# Patient Record
Sex: MALE | Race: WHITE | HISPANIC OR LATINO | Employment: OTHER | URBAN - METROPOLITAN AREA
[De-identification: names, ages, dates, MRNs, and addresses within clinical notes are randomized per-mention and may not be internally consistent; named-entity substitution may affect disease eponyms.]

---

## 2017-01-03 ENCOUNTER — TELEPHONE (OUTPATIENT)
Dept: PULMONOLOGY | Facility: HOSPICE | Age: 62
End: 2017-01-03

## 2017-01-03 DIAGNOSIS — J45.909 UNCOMPLICATED ASTHMA, UNSPECIFIED ASTHMA SEVERITY: ICD-10-CM

## 2017-01-23 ENCOUNTER — OFFICE VISIT (OUTPATIENT)
Dept: PULMONOLOGY | Facility: HOSPICE | Age: 62
End: 2017-01-23
Payer: COMMERCIAL

## 2017-01-23 VITALS
DIASTOLIC BLOOD PRESSURE: 74 MMHG | RESPIRATION RATE: 16 BRPM | HEART RATE: 59 BPM | OXYGEN SATURATION: 92 % | TEMPERATURE: 98.7 F | HEIGHT: 64 IN | SYSTOLIC BLOOD PRESSURE: 126 MMHG | BODY MASS INDEX: 36.7 KG/M2 | WEIGHT: 215 LBS

## 2017-01-23 VITALS — WEIGHT: 215 LBS | BODY MASS INDEX: 39.31 KG/M2

## 2017-01-23 DIAGNOSIS — J30.9 ALLERGIC RHINITIS, UNSPECIFIED ALLERGIC RHINITIS TRIGGER, UNSPECIFIED RHINITIS SEASONALITY: ICD-10-CM

## 2017-01-23 DIAGNOSIS — J45.909 UNCOMPLICATED ASTHMA, UNSPECIFIED ASTHMA SEVERITY: ICD-10-CM

## 2017-01-23 DIAGNOSIS — G47.33 OSA (OBSTRUCTIVE SLEEP APNEA): ICD-10-CM

## 2017-01-23 DIAGNOSIS — I48.0 PAF (PAROXYSMAL ATRIAL FIBRILLATION) (HCC): ICD-10-CM

## 2017-01-23 DIAGNOSIS — I10 ESSENTIAL HYPERTENSION: ICD-10-CM

## 2017-01-23 DIAGNOSIS — J45.20 MILD INTERMITTENT ASTHMA WITHOUT COMPLICATION: ICD-10-CM

## 2017-01-23 PROCEDURE — G8482 FLU IMMUNIZE ORDER/ADMIN: HCPCS | Performed by: NURSE PRACTITIONER

## 2017-01-23 PROCEDURE — 3017F COLORECTAL CA SCREEN DOC REV: CPT | Mod: 8P | Performed by: NURSE PRACTITIONER

## 2017-01-23 PROCEDURE — G8419 CALC BMI OUT NRM PARAM NOF/U: HCPCS | Performed by: NURSE PRACTITIONER

## 2017-01-23 PROCEDURE — 99214 OFFICE O/P EST MOD 30 MIN: CPT | Performed by: NURSE PRACTITIONER

## 2017-01-23 PROCEDURE — G8432 DEP SCR NOT DOC, RNG: HCPCS | Performed by: NURSE PRACTITIONER

## 2017-01-23 PROCEDURE — 1036F TOBACCO NON-USER: CPT | Performed by: NURSE PRACTITIONER

## 2017-01-23 RX ORDER — METOPROLOL SUCCINATE 25 MG/1
25 TABLET, EXTENDED RELEASE ORAL
Refills: 5 | Status: ON HOLD | COMMUNITY
Start: 2017-01-10 | End: 2018-01-04

## 2017-01-23 RX ORDER — INFLUENZA A VIRUS A/IDAHO/07/2018 (H1N1) ANTIGEN (MDCK CELL DERIVED, PROPIOLACTONE INACTIVATED, INFLUENZA A VIRUS A/INDIANA/08/2018 (H3N2) ANTIGEN (MDCK CELL DERIVED, PROPIOLACTONE INACTIVATED), INFLUENZA B VIRUS B/SINGAPORE/INFTT-16-0610/2016 ANTIGEN (MDCK CELL DERIVED, PROPIOLACTONE INACTIVATED), INFLUENZA B VIRUS B/IOWA/06/2017 ANTIGEN (MDCK CELL DERIVED, PROPIOLACTONE INACTIVATED) 15; 15; 15; 15 UG/.5ML; UG/.5ML; UG/.5ML; UG/.5ML
INJECTION, SUSPENSION INTRAMUSCULAR
Refills: 0 | COMMUNITY
Start: 2016-12-20 | End: 2017-05-28

## 2017-01-23 ASSESSMENT — PULMONARY FUNCTION TESTS
FEV1_PERCENT_CHANGE: -2
FEV1_PERCENT_PREDICTED: 105
FVC_PERCENT_PREDICTED: 91
FEV1/FVC_PERCENT_CHANGE: -50
FEV1_PREDICTED: 2.85
FVC: 3.45
FVC_PREDICTED: 3.75
FEV1/FVC_PREDICTED: 76
FEV1/FVC: 87.25
FEV1_PERCENT_CHANGE: 1
FEV1/FVC_PERCENT_PREDICTED: 115
FEV1/FVC_PERCENT_PREDICTED: 111
FEV1/FVC: 84
FEV1: 2.98
FEV1: 3.01
FEV1_PREDICTED: 104
FVC_PERCENT_PREDICTED: 94
FVC: 3.55

## 2017-01-23 NOTE — PROGRESS NOTES
Chief Complaint   Patient presents with   • Apnea       HPI:  Vernon Saldaña is a 61 y.o. year old male here today for follow-up on his Asthma and SAUL. He was last seen in the office in December 2015. He has a history of severe SAUL with AHI of 62 per hour and O2 desat tim to 72%. He is compliant on Auto CPAP at 8-14 CM H20. Compliance card download at his last office visit in December 2015 indicated an AHI of 2.3 with an average use of 5 hours at night. He did not bring his compliance chip to today's office visit. He tolerates the pressure. He does feel he wakes more refreshed. He denies any morning headaches. He uses the nasal pillows which he feels is more comfortable.   He does have a history of allergic rhinitis and mild Asthma. He is compliant on Singulair 10 mg daily. He is off his Dymista nasal spray. He had been on Symbicort, but stopped this 1.5 years ago due to symptomatic improvement. He feels his Asthma symptoms are stable. He denies any cough, mucous or wheeze. He does have frequent runny nose and sinus congestion, which he feels overall has improved. He denies any recent respiratory infections. PFT's December 2015 indicated an FEV1 of 3.14 L, 108% predicted with an FEV1/FVC ratio of 87 and a DLCO of 196% predicted. Spirometry was updated today in the office and indicates an FEV1 of 2.98 L, 104% predicted with an FEV1/FVC ratio of 84 without a positive bronchodilator response.   He does have a history of atrial fibrillation and is followed by Dr. Ryan, Cardiology.     Past Medical History   Diagnosis Date   • Hypercholesteremia    • HTN (hypertension) 8/19/2013   • Preop cardiovascular exam 8/19/2013   • Snoring    • ASTHMA      inhalers   • Arthritis      all joints (osteoarthritis)   • Cholesterol blood decreased    • Sleep apnea      cpap    • PAF (paroxysmal atrial fibrillation) (CMS-[facility]) 8/19/2013   • Arrhythmia 2013     A Flutter   • High cholesterol    • Heart burn    • Pain 2/10/14      10/10 right shoulder   • Enlarged prostate    • Pneumonia 90's       Past Surgical History   Procedure Laterality Date   • Knee arthroscopy  8/14/08     Performed by ANDERSON PLASCENCIA at SURGERY SAME DAY [facility]   • Medial meniscectomy  8/14/08     Performed by ANDERSON PLASCENCIA at SURGERY SAME DAY [facility] ORS   • Other orthopedic surgery  1987     right knee   • Knee unicompartmental  10/21/2013     Performed by Dave Knox M.D. at SURGERY [facility] ORS   • Shoulder arthroscopy w/ rotator cuff repair  2/17/2014     Performed by Dave Knox M.D. at SURGERY [facility] ORS   • Full thickness block resection  4/30/2014     Performed by Isacc Ramírez M.D. at SURGERY [facility]   • Biopsy general  4/30/2014     left eye lid cyst removal   • Knee arthroplasty total Right 5/11/2015     Procedure: KNEE ARTHROPLASTY TOTAL;  Surgeon: Dave Knox M.D.;  Location: SURGERY [facility];  Service:    • Knee manipulation Right 6/29/2015     Procedure: KNEE MANIPULATION;  Surgeon: Dave Knox M.D.;  Location: SURGERY SAME DAY [facility] ORS;  Service:    • Pterygium excision Left 11/11/2015     Procedure: PTERYGIUM EXCISION W/AMNIOGRAFT AND MITOMYCIN;  Surgeon: Homer Parsons M.D.;  Location: SURGERY [facility];  Service:    • Pterygium excision Right 3/8/2016     Procedure: PTERYGIUM EXCISION;  Surgeon: Homer Parsons M.D.;  Location: [facility];  Service:        Family History   Problem Relation Age of Onset   • Heart Disease Father    • Diabetes Father    • Heart Disease Sister      surgery at age 10   • Stroke     • Heart Attack Mother 77       Social History     Social History   • Marital Status:      Spouse Name: N/A   • Number of Children: N/A   • Years of Education: N/A     Occupational History   • Not on file.     Social History Main Topics   • Smoking status: Former Smoker -- 1.00 packs/day for 13 years     Types: Cigarettes     Start date:  01/01/1967     Quit date: 01/01/1980   • Smokeless tobacco: Never Used   • Alcohol Use: No   • Drug Use: No   • Sexual Activity: Not on file     Other Topics Concern   • Not on file     Social History Narrative       ROS:  Constitutional: Denies fevers, chills, sweats, fatigue, weight loss  Eyes: Denies glasses, vision loss, pain, drainage, double vision  Ears/Nose/Mouth/Throat: Denies  ear ache, difficulty hearing, sore throat, persistent hoarseness, decayed teeth/toothache  Cardiovascular: Denies chest pain, tightness, palpitations, swelling in feet/legs, fainting, difficulty breathing when laying down  Respiratory: See HPI   GI: Denies heartburn, difficulty swallowing, nausea, vomiting, abdominal pain, diarrhea, constipation  : Denies frequent urination, painful urination  Integumentary: Denies rashes, lumps or color changes  MSK: Denies painful joints, sore muscles, and back pain.   Neurological: Denies frequent headaches, dizziness, weakness  Sleep: See HPI       Current Outpatient Prescriptions on File Prior to Visit   Medication Sig Dispense Refill   • naproxen (ALEVE) 220 MG tablet Take 220 mg by mouth 2 times a day, with meals.     • cyclobenzaprine (FLEXERIL) 10 MG Tab Take 10 mg by mouth 3 times a day as needed.     • omeprazole (PRILOSEC) 20 MG delayed-release capsule Take 20 mg by mouth every day.     • propafenone (RYTHMOL) 225 MG tablet TAKE 1 TABLET BY MOUTH EVERY 8 HOURS 270 Tab 3   • loratadine (CLARITIN) 10 MG Tab Take 10 mg by mouth every day.     • ranitidine (ZANTAC) 150 MG Tab Take 150 mg by mouth 2 times a day.     • aspirin (ASA) 81 MG Chew Tab chewable tablet Take 1 Tab by mouth every day. 100 Tab 11   • metoprolol SR (TOPROL XL) 50 MG TB24 Take 1 Tab by mouth every day. (Patient taking differently: Take 50 mg by mouth every bedtime.) 90 Tab 3   • tamsulosin (FLOMAX) 0.4 MG capsule Take 0.4 mg by mouth every bedtime.     • atorvastatin (LIPITOR) 20 MG TABS Take 20 mg by mouth every  "evening.     • montelukast (SINGULAIR) 10 MG Tab Take 10 mg by mouth every day.     • Albuterol Sulfate 108 (90 BASE) MCG/ACT AEROSOL POWDER, BREATH ACTIVATED Inhale  by mouth.     • naproxen (ANAPROX) 220 MG tablet Take 220 mg by mouth 2 times a day, with meals.     • azelastine (ASTELIN) 137 MCG/SPRAY nasal spray USE 1-2 SPRAYS TO EACH NOSTRIL TWICE DAY 30 Leonidas 3   • Difluprednate 0.05 % Emulsion by Ophthalmic route.     • albuterol (VENTOLIN OR PROVENTIL) 108 (90 BASE) MCG/ACT AERS inhalation aerosol Inhale 2 Puffs by mouth every 6 hours as needed for Shortness of Breath.     • AZELASTINE HCL NA Spray 2 Sprays in nose 2 Times a Day.       No current facility-administered medications on file prior to visit.     Review of patient's allergies indicates no known allergies.    Blood pressure 126/74, pulse 59, temperature 37.1 °C (98.7 °F), resp. rate 16, height 1.638 m (5' 4.49\"), weight 97.523 kg (215 lb), SpO2 92 %.  PE:   Appearance: Well developed, well nourished, no acute distress  Eyes: PERRL, EOM intact, sclera white, conjunctiva moist  Ears: no lesions or deformities  Hearing: grossly intact  Nose: no lesions or deformities  Oropharynx: tongue normal, posterior pharynx without erythema or exudate  Mallampati Classification: class 4  Neck: supple, trachea midline, no masses   Respiratory effort: no intercostal retractions or use of accessory muscles  Lung auscultation: no rales, rhonchi or wheezes  Heart auscultation: no murmur rub or gallop  Extremities: no cyanosis or edema  Abdomen: soft ,non tender, no masses  Gait and Station: normal  Digits and nails: no clubbing, cyanosis, petechiae or nodes.  Cranial nerves: grossly intact  Skin: no rashes, lesions or ulcers noted  Orientation: Oriented to time, person and place  Mood and affect: mood and affect appropriate, normal interaction with examiner  Judgement: Intact          Assessment:  1. SAUL (obstructive sleep apnea)     2. Mild intermittent asthma without " complication     3. Allergic rhinitis, unspecified allergic rhinitis trigger, unspecified rhinitis seasonality     4. PAF (paroxysmal atrial fibrillation) (CMS-[facility])     5. Essential hypertension           Plan:    1) Continue Auto CPAP 8-14 CM H20. Order to [facility] for new mask and supplies.   2) Sleep hygiene discussed.  3) Continue to follow with Cardiology.  4) Continue Singulair 10 mg 1 po qhs. Sample of Proair respiclick provided to have on hand. Instructed in 1-2 puffs every 4 hours as needed. He has not required use of inhalers in over 1 year. However, I have encouraged him to keep a rescue inhaler on hand.  5) He is up to date on Prevnar 13, Pneumovax 23 and Influenza vaccines.   6) Follow up in 1 year, sooner if needed. His daughter states she will bring his compliance chip to the office this week to have download performed.

## 2017-01-23 NOTE — PROCEDURES
Technician: SHANKAR Javier    Technician Comment:  Good patient effort & cooperation.  Pt had brief Vaso- vagel episode following each maneuver.  The results of this test meet the ATS/ERS standards for acceptability & reproducibility.  Test was performed on the [employer] Body Plethysmograph-Elite DX system.  Predicted values were NHanes-3.  A bronchodilator of Ventolin HFA -2puffs via spacer administered.    Interpretation:    Spirometry performed on January 23, 2017 showed normal baseline spirometry, no change after bronchodilators, flow volume loop appears normal as well with good effort noted

## 2017-01-23 NOTE — MR AVS SNAPSHOT
Vernon Aliceaio   2017 1:00 PM   Office Visit   MRN: [id number]    Department:  [facility]   Dept Phone:  [phone]    Description:  Male : 1955   Provider:  Allyssa Reddy M.D.           Allergies as of 2017     No Known Allergies      You were diagnosed with     Uncomplicated asthma, unspecified asthma severity   [[id number]]         Vital Signs     Weight Smoking Status                97.523 kg (215 lb) Former Smoker          Basic Information     Date Of Birth Sex Race Ethnicity Preferred Language    1955 Male  or   Origin (Bengali,Nigerian,Mongolian,Ignacio, etc) English      Your appointments     2017 10:30 AM   FOLLOW UP with Dave Ryan M.D.   [facility] ([facility]    [address]  [facility] [id number] [phone]              Problem List              ICD-10-CM Priority Class Noted - Resolved    PAF (paroxysmal atrial fibrillation) (CMS-HCC) I48.0   2013 - Present    Preop cardiovascular exam Z01.810   2013 - Present    Hypercholesteremia E78.00   Unknown - Present    Arthritis M19.90   Unknown - Present    SAUL (obstructive sleep apnea) G47.33   10/2/2013 - Present    Osteoarthritis of left knee M17.9   10/21/2013 - Present    Complete rupture of rotator cuff M75.120   2014 - Present    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   2014 - Present    Primary localized osteoarthrosis, lower leg M17.10   2015 - Present    Osteoarthrosis involving lower leg M17.9   2015 - Present    Essential hypertension I10   2015 - Present    Peripheral pterygium, progressive H11.059   2015 - Present    History of pterygium excision Z98.890   3/8/2016 - Present    Mild intermittent asthma without complication J45.20   2017 - Present    Allergic rhinitis J30.9   2017 - Present      Health Maintenance        Date Due Completion Dates    IMM DTaP/Tdap/Td Vaccine  (1 - Tdap) 7/8/1973 ---    COLONOSCOPY 7/8/2004 ---    IMM ZOSTER VACCINE 7/8/2014 ---    IMM INFLUENZA (1) 9/1/2016 10/22/2013            Current Immunizations     Influenza TIV (IM) 12/20/2016, 10/22/2013  1:48 PM    Pneumococcal polysaccharide vaccine (PPSV-23) 10/22/2013  1:54 PM      Below and/or attached are the medications your provider expects you to take. Review all of your home medications and newly ordered medications with your provider and/or pharmacist. Follow medication instructions as directed by your provider and/or pharmacist. Please keep your medication list with you and share with your provider. Update the information when medications are discontinued, doses are changed, or new medications (including over-the-counter products) are added; and carry medication information at all times in the event of emergency situations     Allergies:  No Known Allergies          Medications  Valid as of: January 23, 2017 -  2:15 PM    Generic Name Brand Name Tablet Size Instructions for use    Albuterol Sulfate (Aero Soln) albuterol 108 (90 BASE) MCG/ACT Inhale 2 Puffs by mouth every 6 hours as needed for Shortness of Breath.        Albuterol Sulfate (AEROSOL POWDER, BREATH ACTIVATED) Albuterol Sulfate 108 (90 BASE) MCG/ACT Inhale  by mouth.        Aspirin (Chew Tab) ASA 81 MG Take 1 Tab by mouth every day.        Atorvastatin Calcium (Tab) LIPITOR 20 MG Take 20 mg by mouth every evening.        Azelastine HCl   Spray 2 Sprays in nose 2 Times a Day.        Azelastine HCl (Solution) ASTELIN 137 MCG/SPRAY USE 1-2 SPRAYS TO EACH NOSTRIL TWICE DAY        Cyclobenzaprine HCl (Tab) FLEXERIL 10 MG Take 10 mg by mouth 3 times a day as needed.        Difluprednate (Emulsion) Difluprednate 0.05 % by Ophthalmic route.        Influenza Vac Subunit Quad (Suspension Prefilled Syringe) FLUCELVAX QUADRIVALENT 0.5 ML TO BE ADMINISTERED BY PHARMACIST FOR IMMUNIZATION        Loratadine (Tab) CLARITIN 10 MG Take 10 mg by mouth every  day.        Metoprolol Succinate (TABLET SR 24 HR) TOPROL XL 50 MG Take 1 Tab by mouth every day.        Metoprolol Succinate (TABLET SR 24 HR) TOPROL XL 25 MG Take 25 mg by mouth every day. FOR BLOOD PRESSURE        Montelukast Sodium (Tab) SINGULAIR 10 MG Take 10 mg by mouth every day.        Naproxen Sodium (Tab) ANAPROX 220 MG Take 220 mg by mouth 2 times a day, with meals.        Naproxen Sodium (Tab) ANAPROX 220 MG Take 220 mg by mouth 2 times a day, with meals.        Omeprazole (CAPSULE DELAYED RELEASE) PRILOSEC 20 MG Take 20 mg by mouth every day.        Propafenone HCl (Tab) RYTHMOL 225 MG TAKE 1 TABLET BY MOUTH EVERY 8 HOURS        RaNITidine HCl (Tab) ZANTAC 150 MG Take 150 mg by mouth 2 times a day.        Tamsulosin HCl (Cap) FLOMAX 0.4 MG Take 0.4 mg by mouth every bedtime.        .                 Medicines prescribed today were sent to:     [facility] #[id number] - [place] - [address]    [address] [place] [address]    Phone: [phone] Fax: [phone]    Open 24 Hours?: No      Medication refill instructions:       If your prescription bottle indicates you have medication refills left, it is not necessary to call your provider’s office. Please contact your pharmacy and they will refill your medication.    If your prescription bottle indicates you do not have any refills left, you may request refills at any time through one of the following ways: The online [employer] system (except Urgent Care), by calling your provider’s office, or by asking your pharmacy to contact your provider’s office with a refill request. Medication refills are processed only during regular business hours and may not be available until the next business day. Your provider may request additional information or to have a follow-up visit with you prior to refilling your medication.   *Please Note: Medication refills are assigned a new Rx number when refilled electronically. Your pharmacy may indicate that no refills  were authorized even though a new prescription for the same medication is available at the pharmacy. Please request the medicine by name with the pharmacy before contacting your provider for a refill.           MyChart Access Code: Activation code not generated  Current [employer] Status: Active

## 2017-01-23 NOTE — PATIENT INSTRUCTIONS
Plan:    1) Continue Auto CPAP 8-14 CM H20. Order to [facility] for new mask and supplies.   2) Sleep hygiene discussed.  3) Continue to follow with Cardiology.  4) Continue Singulair 10 mg 1 po qhs. Sample of Proair respiclick provided to have on hand. Instructed in 1-2 puffs every 4 hours as needed. He has not required use of inhalers in over 1 year. However, I have encouraged him to keep a rescue inhaler on hand.  5) He is up to date on Prevnar 13, Pneumovax 23 and Influenza vaccines.   6) Follow up in 1 year, sooner if needed. His daughter states she will bring his compliance chip to the office this week to have download performed.

## 2017-02-08 ENCOUNTER — HOSPITAL ENCOUNTER (OUTPATIENT)
Dept: LAB | Facility: MEDICAL CENTER | Age: 62
End: 2017-02-08
Attending: FAMILY MEDICINE
Payer: COMMERCIAL

## 2017-02-08 LAB
ALBUMIN SERPL BCP-MCNC: 4.3 G/DL (ref 3.2–4.9)
ALBUMIN/GLOB SERPL: 1.7 G/DL
ALP SERPL-CCNC: 58 U/L (ref 30–99)
ALT SERPL-CCNC: 30 U/L (ref 2–50)
ANION GAP SERPL CALC-SCNC: 7 MMOL/L (ref 0–11.9)
AST SERPL-CCNC: 23 U/L (ref 12–45)
BILIRUB SERPL-MCNC: 0.5 MG/DL (ref 0.1–1.5)
BUN SERPL-MCNC: 21 MG/DL (ref 8–22)
CALCIUM SERPL-MCNC: 9.8 MG/DL (ref 8.5–10.5)
CHLORIDE SERPL-SCNC: 104 MMOL/L (ref 96–112)
CHOLEST SERPL-MCNC: 166 MG/DL (ref 100–199)
CO2 SERPL-SCNC: 25 MMOL/L (ref 20–33)
CREAT SERPL-MCNC: 1.02 MG/DL (ref 0.5–1.4)
GLOBULIN SER CALC-MCNC: 2.6 G/DL (ref 1.9–3.5)
GLUCOSE SERPL-MCNC: 114 MG/DL (ref 65–99)
HDLC SERPL-MCNC: 36 MG/DL
LDLC SERPL CALC-MCNC: 97 MG/DL
POTASSIUM SERPL-SCNC: 4.5 MMOL/L (ref 3.6–5.5)
PROT SERPL-MCNC: 6.9 G/DL (ref 6–8.2)
SODIUM SERPL-SCNC: 136 MMOL/L (ref 135–145)
TRIGL SERPL-MCNC: 166 MG/DL (ref 0–149)

## 2017-02-08 PROCEDURE — 36415 COLL VENOUS BLD VENIPUNCTURE: CPT

## 2017-02-08 PROCEDURE — 80061 LIPID PANEL: CPT

## 2017-02-08 PROCEDURE — 80053 COMPREHEN METABOLIC PANEL: CPT

## 2017-03-08 ENCOUNTER — OFFICE VISIT (OUTPATIENT)
Dept: URGENT CARE | Facility: PHYSICIAN GROUP | Age: 62
End: 2017-03-08
Payer: COMMERCIAL

## 2017-03-08 VITALS
OXYGEN SATURATION: 97 % | TEMPERATURE: 97.3 F | SYSTOLIC BLOOD PRESSURE: 118 MMHG | BODY MASS INDEX: 39.75 KG/M2 | HEIGHT: 62 IN | DIASTOLIC BLOOD PRESSURE: 68 MMHG | WEIGHT: 216 LBS | HEART RATE: 97 BPM

## 2017-03-08 DIAGNOSIS — R10.9 ABDOMINAL PAIN, UNSPECIFIED LOCATION: ICD-10-CM

## 2017-03-08 PROCEDURE — G8419 CALC BMI OUT NRM PARAM NOF/U: HCPCS | Performed by: FAMILY MEDICINE

## 2017-03-08 PROCEDURE — G8482 FLU IMMUNIZE ORDER/ADMIN: HCPCS | Performed by: FAMILY MEDICINE

## 2017-03-08 PROCEDURE — 1036F TOBACCO NON-USER: CPT | Performed by: FAMILY MEDICINE

## 2017-03-08 PROCEDURE — 3017F COLORECTAL CA SCREEN DOC REV: CPT | Mod: 8P | Performed by: FAMILY MEDICINE

## 2017-03-08 PROCEDURE — 94760 N-INVAS EAR/PLS OXIMETRY 1: CPT

## 2017-03-08 PROCEDURE — 99214 OFFICE O/P EST MOD 30 MIN: CPT | Performed by: FAMILY MEDICINE

## 2017-03-08 PROCEDURE — G8432 DEP SCR NOT DOC, RNG: HCPCS | Performed by: FAMILY MEDICINE

## 2017-03-08 PROCEDURE — 99285 EMERGENCY DEPT VISIT HI MDM: CPT

## 2017-03-08 RX ORDER — TRAMADOL HYDROCHLORIDE 50 MG/1
50 TABLET ORAL EVERY 8 HOURS
COMMUNITY
End: 2017-08-26

## 2017-03-08 ASSESSMENT — ENCOUNTER SYMPTOMS
DIARRHEA: 1
VOMITING: 1
ARTHRALGIAS: 0
BLOATING: 1

## 2017-03-08 NOTE — MR AVS SNAPSHOT
"        Vernon Saldaña   3/8/2017 6:10 PM   Office Visit   MRN: [id number]    Department:  [facility]   Dept Phone:  [phone]    Description:  Male : 1955   Provider:  Addy Zelaya M.D.           Reason for Visit     Diarrhea diarrhea, vomiting, blood stool, abd pain x 1day      Allergies as of 3/8/2017     No Known Allergies      You were diagnosed with     Abdominal pain, unspecified location   [[id number]]         Vital Signs     Blood Pressure Pulse Temperature Height Weight Body Mass Index    118/68 mmHg 97 36.3 °C (97.3 °F) 1.575 m (5' 2.01\") 97.977 kg (216 lb) 39.50 kg/m2    Oxygen Saturation Smoking Status                97% Former Smoker          Basic Information     Date Of Birth Sex Race Ethnicity Preferred Language    1955 Male  or   Origin (Latvian,Malian,Dominican,Ignacio, etc) English      Your appointments     2017 10:30 AM   FOLLOW UP with Dave Ryan M.D.   [facility] (--)    [address]  [facility] [id number] [phone]              Problem List              ICD-10-CM Priority Class Noted - Resolved    PAF (paroxysmal atrial fibrillation) (CMS-HCC) I48.0   2013 - Present    Preop cardiovascular exam Z01.810   2013 - Present    Hypercholesteremia E78.00   Unknown - Present    Arthritis M19.90   Unknown - Present    SAUL (obstructive sleep apnea) G47.33   10/2/2013 - Present    Osteoarthritis of left knee M17.9   10/21/2013 - Present    Complete rupture of rotator cuff M75.120   2014 - Present    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   2014 - Present    Primary localized osteoarthrosis, lower leg M17.10   2015 - Present    Osteoarthrosis involving lower leg M17.9   2015 - Present    Essential hypertension I10   2015 - Present    Peripheral pterygium, progressive H11.059   2015 - Present    History of pterygium excision Z98.890   3/8/2016 - " Present    Mild intermittent asthma without complication J45.20   1/23/2017 - Present    Allergic rhinitis J30.9   1/23/2017 - Present      Health Maintenance        Date Due Completion Dates    IMM DTaP/Tdap/Td Vaccine (1 - Tdap) 7/8/1973 ---    COLONOSCOPY 7/8/2004 ---    IMM ZOSTER VACCINE 7/8/2014 ---            Current Immunizations     Influenza TIV (IM) 12/20/2016, 10/22/2013  1:48 PM    Pneumococcal polysaccharide vaccine (PPSV-23) 10/22/2013  1:54 PM      Below and/or attached are the medications your provider expects you to take. Review all of your home medications and newly ordered medications with your provider and/or pharmacist. Follow medication instructions as directed by your provider and/or pharmacist. Please keep your medication list with you and share with your provider. Update the information when medications are discontinued, doses are changed, or new medications (including over-the-counter products) are added; and carry medication information at all times in the event of emergency situations     Allergies:  No Known Allergies          Medications  Valid as of: March 08, 2017 -  6:39 PM    Generic Name Brand Name Tablet Size Instructions for use    Albuterol Sulfate (Aero Soln) albuterol 108 (90 BASE) MCG/ACT Inhale 2 Puffs by mouth every 6 hours as needed for Shortness of Breath.        Albuterol Sulfate (AEROSOL POWDER, BREATH ACTIVATED) Albuterol Sulfate 108 (90 BASE) MCG/ACT Inhale  by mouth.        Aspirin (Chew Tab) ASA 81 MG Take 1 Tab by mouth every day.        Atorvastatin Calcium (Tab) LIPITOR 20 MG Take 20 mg by mouth every evening.        Azelastine HCl   Spray 2 Sprays in nose 2 Times a Day.        Azelastine HCl (Solution) ASTELIN 137 MCG/SPRAY USE 1-2 SPRAYS TO EACH NOSTRIL TWICE DAY        Cyclobenzaprine HCl (Tab) FLEXERIL 10 MG Take 10 mg by mouth 3 times a day as needed.        Difluprednate (Emulsion) Difluprednate 0.05 % by Ophthalmic route.        Influenza Vac Subunit Quad  (Suspension Prefilled Syringe) FLUCELVAX QUADRIVALENT 0.5 ML TO BE ADMINISTERED BY PHARMACIST FOR IMMUNIZATION        Loratadine (Tab) CLARITIN 10 MG Take 10 mg by mouth every day.        Metoprolol Succinate (TABLET SR 24 HR) TOPROL XL 50 MG Take 1 Tab by mouth every day.        Metoprolol Succinate (TABLET SR 24 HR) TOPROL XL 25 MG Take 25 mg by mouth every day. FOR BLOOD PRESSURE        Montelukast Sodium (Tab) SINGULAIR 10 MG Take 10 mg by mouth every day.        Naproxen Sodium (Tab) ANAPROX 220 MG Take 220 mg by mouth 2 times a day, with meals.        Naproxen Sodium (Tab) ANAPROX 220 MG Take 220 mg by mouth 2 times a day, with meals.        Omeprazole (CAPSULE DELAYED RELEASE) PRILOSEC 20 MG Take 20 mg by mouth every day.        Propafenone HCl (Tab) RYTHMOL 225 MG TAKE 1 TABLET BY MOUTH EVERY 8 HOURS        RaNITidine HCl (Tab) ZANTAC 150 MG Take 150 mg by mouth 2 times a day.        Tamsulosin HCl (Cap) FLOMAX 0.4 MG Take 0.4 mg by mouth every bedtime.        TraMADol HCl (Tab) ULTRAM 50 MG Take 50 mg by mouth every four hours as needed.        .                 Medicines prescribed today were sent to:     [id number] - [place] [address] [facility]    [address] [place] [address]    Phone: [phone] Fax: [phone]    Open 24 Hours?: No      Medication refill instructions:       If your prescription bottle indicates you have medication refills left, it is not necessary to call your provider’s office. Please contact your pharmacy and they will refill your medication.    If your prescription bottle indicates you do not have any refills left, you may request refills at any time through one of the following ways: The online Presdo system (except Urgent Care), by calling your provider’s office, or by asking your pharmacy to contact your provider’s office with a refill request. Medication refills are processed only during regular business hours and may not be available until the next business day.  Your provider may request additional information or to have a follow-up visit with you prior to refilling your medication.   *Please Note: Medication refills are assigned a new Rx number when refilled electronically. Your pharmacy may indicate that no refills were authorized even though a new prescription for the same medication is available at the pharmacy. Please request the medicine by name with the pharmacy before contacting your provider for a refill.        Instructions    Dolor abdominal   (Abdominal Pain)   El dolor abdominal o dolor en el estómago puede ser causado por muchos factores. El médico decidirá la gravedad de la causa dolor por medio de un examen físico y le solicitará pruebas y radiografías. Muchos de estos casos pueden controlarse y tratarse en casa. La mayor parte de los roddy en la piper abdominal que padecen los niños es funcional. [place] significa que no  está originado en ninguna enfermedad y que probablemente mejorará sin tratamiento.   INSTRUCCIONES PARA EL CUIDADO EN EL HOGAR  · No tome ni administre laxantes a menos que se lo haya indicado el médico.  · Utilice los medicamentos de venta librado o de prescripción para el dolor, el malestar o la fiebre, según se lo indique el médico.  · Vinton medicación para el alivio del dolor sólo si se lo ha indicado el profesional.  · Consuma mazin dieta líquida: caldo, té o agua el tiempo que se lo indique baron médico. Luego podrá gradualmente consumir mazin dieta blanda según lo que tolere cada paciente.  SOLICITE ATENCIÓN MÉDICA DE INMEDIATO SI:  · El dolor persiste.  · Tiene fiebre.  · Presenta vómitos repetidas veces.  · Siente el dolor sólo en algunos sectores del abdomen (vientre). Si se localiza en la piper derecha, posiblemente podría tratarse de apendicitis. En un adulto, si se localiza en la región inferior izquierda del abdomen, podría tratarse de colitis o diverticulitis.  · Hay adi en las heces (deposiciones de color pina brillante o lynda  olegario).  ASEGÚRESE DE QUE:   · Comprende las instrucciones para el cherelle médica.  · Controlará baron enfermedad.  · Solicitará atención médica de inmediato según las indicaciones.  Document Released: 12/18/2006 Document Revised: 06/18/2013  [employer]® Patient Information ©2014 [employer].            [employer] Access Code: Activation code not generated  Current [employer] Status: Active

## 2017-03-09 ENCOUNTER — HOSPITAL ENCOUNTER (EMERGENCY)
Facility: MEDICAL CENTER | Age: 62
End: 2017-03-09
Attending: EMERGENCY MEDICINE
Payer: COMMERCIAL

## 2017-03-09 ENCOUNTER — APPOINTMENT (OUTPATIENT)
Dept: RADIOLOGY | Facility: MEDICAL CENTER | Age: 62
End: 2017-03-09
Attending: EMERGENCY MEDICINE
Payer: COMMERCIAL

## 2017-03-09 VITALS
OXYGEN SATURATION: 93 % | WEIGHT: 210.76 LBS | SYSTOLIC BLOOD PRESSURE: 117 MMHG | HEIGHT: 62 IN | HEART RATE: 64 BPM | DIASTOLIC BLOOD PRESSURE: 85 MMHG | TEMPERATURE: 97.9 F | BODY MASS INDEX: 38.78 KG/M2 | RESPIRATION RATE: 20 BRPM

## 2017-03-09 DIAGNOSIS — K52.9 COLITIS, ACUTE: ICD-10-CM

## 2017-03-09 LAB
ALBUMIN SERPL BCP-MCNC: 4.1 G/DL (ref 3.2–4.9)
ALBUMIN/GLOB SERPL: 1.1 G/DL
ALP SERPL-CCNC: 61 U/L (ref 30–99)
ALT SERPL-CCNC: 26 U/L (ref 2–50)
ANION GAP SERPL CALC-SCNC: 7 MMOL/L (ref 0–11.9)
AST SERPL-CCNC: 20 U/L (ref 12–45)
BASOPHILS # BLD AUTO: 0.5 % (ref 0–1.8)
BASOPHILS # BLD: 0.03 K/UL (ref 0–0.12)
BILIRUB SERPL-MCNC: 1 MG/DL (ref 0.1–1.5)
BUN SERPL-MCNC: 22 MG/DL (ref 8–22)
CALCIUM SERPL-MCNC: 9.9 MG/DL (ref 8.5–10.5)
CHLORIDE SERPL-SCNC: 103 MMOL/L (ref 96–112)
CO2 SERPL-SCNC: 27 MMOL/L (ref 20–33)
CREAT SERPL-MCNC: 1 MG/DL (ref 0.5–1.4)
EOSINOPHIL # BLD AUTO: 0.32 K/UL (ref 0–0.51)
EOSINOPHIL NFR BLD: 5.1 % (ref 0–6.9)
ERYTHROCYTE [DISTWIDTH] IN BLOOD BY AUTOMATED COUNT: 41.5 FL (ref 35.9–50)
GFR SERPL CREATININE-BSD FRML MDRD: >60 ML/MIN/1.73 M 2
GLOBULIN SER CALC-MCNC: 3.6 G/DL (ref 1.9–3.5)
GLUCOSE SERPL-MCNC: 98 MG/DL (ref 65–99)
HCT VFR BLD AUTO: 50.4 % (ref 42–52)
HGB BLD-MCNC: 16.9 G/DL (ref 14–18)
IMM GRANULOCYTES # BLD AUTO: 0.02 K/UL (ref 0–0.11)
IMM GRANULOCYTES NFR BLD AUTO: 0.3 % (ref 0–0.9)
LIPASE SERPL-CCNC: 11 U/L (ref 11–82)
LYMPHOCYTES # BLD AUTO: 2.67 K/UL (ref 1–4.8)
LYMPHOCYTES NFR BLD: 42.7 % (ref 22–41)
MCH RBC QN AUTO: 30.5 PG (ref 27–33)
MCHC RBC AUTO-ENTMCNC: 33.5 G/DL (ref 33.7–35.3)
MCV RBC AUTO: 91 FL (ref 81.4–97.8)
MONOCYTES # BLD AUTO: 0.5 K/UL (ref 0–0.85)
MONOCYTES NFR BLD AUTO: 8 % (ref 0–13.4)
NEUTROPHILS # BLD AUTO: 2.72 K/UL (ref 1.82–7.42)
NEUTROPHILS NFR BLD: 43.4 % (ref 44–72)
NRBC # BLD AUTO: 0 K/UL
NRBC BLD AUTO-RTO: 0 /100 WBC
PLATELET # BLD AUTO: 209 K/UL (ref 164–446)
PMV BLD AUTO: 10.2 FL (ref 9–12.9)
POTASSIUM SERPL-SCNC: 4.1 MMOL/L (ref 3.6–5.5)
PROT SERPL-MCNC: 7.7 G/DL (ref 6–8.2)
RBC # BLD AUTO: 5.54 M/UL (ref 4.7–6.1)
SODIUM SERPL-SCNC: 137 MMOL/L (ref 135–145)
WBC # BLD AUTO: 6.3 K/UL (ref 4.8–10.8)

## 2017-03-09 PROCEDURE — 96367 TX/PROPH/DG ADDL SEQ IV INF: CPT

## 2017-03-09 PROCEDURE — 85025 COMPLETE CBC W/AUTO DIFF WBC: CPT

## 2017-03-09 PROCEDURE — 700111 HCHG RX REV CODE 636 W/ 250 OVERRIDE (IP): Performed by: EMERGENCY MEDICINE

## 2017-03-09 PROCEDURE — 700105 HCHG RX REV CODE 258: Performed by: EMERGENCY MEDICINE

## 2017-03-09 PROCEDURE — 700117 HCHG RX CONTRAST REV CODE 255: Performed by: EMERGENCY MEDICINE

## 2017-03-09 PROCEDURE — 96365 THER/PROPH/DIAG IV INF INIT: CPT

## 2017-03-09 PROCEDURE — 83690 ASSAY OF LIPASE: CPT

## 2017-03-09 PROCEDURE — 700101 HCHG RX REV CODE 250: Performed by: EMERGENCY MEDICINE

## 2017-03-09 PROCEDURE — 96361 HYDRATE IV INFUSION ADD-ON: CPT

## 2017-03-09 PROCEDURE — 74177 CT ABD & PELVIS W/CONTRAST: CPT

## 2017-03-09 PROCEDURE — 80053 COMPREHEN METABOLIC PANEL: CPT

## 2017-03-09 RX ORDER — METRONIDAZOLE 500 MG/1
500 TABLET ORAL 3 TIMES DAILY
Qty: 21 TAB | Refills: 0 | Status: SHIPPED | OUTPATIENT
Start: 2017-03-09 | End: 2017-03-16

## 2017-03-09 RX ORDER — CIPROFLOXACIN 2 MG/ML
400 INJECTION, SOLUTION INTRAVENOUS ONCE
Status: COMPLETED | OUTPATIENT
Start: 2017-03-09 | End: 2017-03-09

## 2017-03-09 RX ORDER — CIPROFLOXACIN 500 MG/1
500 TABLET, FILM COATED ORAL 2 TIMES DAILY
Qty: 14 TAB | Refills: 0 | Status: SHIPPED | OUTPATIENT
Start: 2017-03-09 | End: 2017-03-16

## 2017-03-09 RX ORDER — SODIUM CHLORIDE 9 MG/ML
1000 INJECTION, SOLUTION INTRAVENOUS ONCE
Status: COMPLETED | OUTPATIENT
Start: 2017-03-09 | End: 2017-03-09

## 2017-03-09 RX ADMIN — IOHEXOL 100 ML: 350 INJECTION, SOLUTION INTRAVENOUS at 04:06

## 2017-03-09 RX ADMIN — METRONIDAZOLE 500 MG: 500 INJECTION, SOLUTION INTRAVENOUS at 06:23

## 2017-03-09 RX ADMIN — CIPROFLOXACIN 400 MG: 2 INJECTION, SOLUTION INTRAVENOUS at 05:02

## 2017-03-09 RX ADMIN — SODIUM CHLORIDE 1000 ML: 9 INJECTION, SOLUTION INTRAVENOUS at 02:48

## 2017-03-09 NOTE — ED AVS SNAPSHOT
[employer] Access Code: Activation code not generated  Current [employer] Status: Active    Rhytechart  A secure, online tool to manage your health information     [employer]® is a secure, online tool that connects you to your personalized health information from the privacy of your home -- day or night - making it very easy for you to manage your healthcare. Once the activation process is completed, you can even access your medical information using the [employer] rhonda, which is available for free in the Apple Rhonda store or Google Play store.     [employer] provides the following levels of access (as shown below):   My Chart Features   [facility] Primary Care Doctor [facility]  Specialists [facility]  Urgent  Care Non-[facility]  Primary Care  Doctor   Email your healthcare team securely and privately 24/7 X X X X   Manage appointments: schedule your next appointment; view details of past/upcoming appointments X      Request prescription refills. X      View recent personal medical records, including lab and immunizations X X X X   View health record, including health history, allergies, medications X X X X   Read reports about your outpatient visits, procedures, consult and ER notes X X X X   See your discharge summary, which is a recap of your hospital and/or ER visit that includes your diagnosis, lab results, and care plan. X X       How to register for [employer]:  1. Go to  [email or web address].  2. Click on the Sign Up Now box, which takes you to the New Member Sign Up page. You will need to provide the following information:  a. Enter your [employer] Access Code exactly as it appears at the top of this page. (You will not need to use this code after you’ve completed the sign-up process. If you do not sign up before the expiration date, you must request a new code.)   b. Enter your date of birth.   c. Enter your home email address.   d. Click Submit, and follow the next screen’s instructions.  3. Create a [employer] ID. This will  be your [email or web address] login ID and cannot be changed, so think of one that is secure and easy to remember.  4. Create a [employer] password. You can change your password at any time.  5. Enter your Password Reset Question and Answer. This can be used at a later time if you forget your password.   6. Enter your e-mail address. This allows you to receive e-mail notifications when new information is available in [email or web address].  7. Click Sign Up. You can now view your health information.    For assistance activating your [email or web address] account, call [phone]

## 2017-03-09 NOTE — PROGRESS NOTES
"Subjective:      Vernon Saldaña is a 61 y.o. male who presents with Diarrhea            Diarrhea   This is a new problem. The current episode started yesterday. The problem occurs 2 to 4 times per day. The stool consistency is described as bloody. Associated symptoms include bloating and vomiting. Pertinent negatives include no arthralgias.       Review of Systems   Gastrointestinal: Positive for vomiting, diarrhea and bloating.   Musculoskeletal: Negative for arthralgias.     No Known Allergies      Objective:     /68 mmHg  Pulse 97  Temp(Src) 36.3 °C (97.3 °F)  Ht 1.575 m (5' 2.01\")  Wt 97.977 kg (216 lb)  BMI 39.50 kg/m2  SpO2 97%     Physical Exam   Constitutional: He is oriented to person, place, and time. He appears well-developed and well-nourished. No distress.   HENT:   Head: Normocephalic and atraumatic.   Eyes: Conjunctivae and EOM are normal. Pupils are equal, round, and reactive to light.   Cardiovascular: Normal rate and regular rhythm.    No murmur heard.  Pulmonary/Chest: Effort normal and breath sounds normal. No respiratory distress.   Abdominal: Soft. He exhibits no distension. There is tenderness in the left lower quadrant. There is guarding. There is no rigidity, no rebound, no CVA tenderness, no tenderness at McBurney's point and negative Saez's sign.   Neurological: He is alert and oriented to person, place, and time. He has normal reflexes. No sensory deficit.   Skin: Skin is warm and dry.   Psychiatric: He has a normal mood and affect.               Assessment/Plan:     1. Abdominal pain, unspecified location  Discussed With Dr. Aleman at [facility] who will evaluate there. Differential diagnosis, natural history, supportive care, and indications for immediate follow-up discussed.         "

## 2017-03-09 NOTE — ED PROVIDER NOTES
"ED Provider Note    Scribed for Evon Farfan M.D. by Padmaja Martin. 3/9/2017, 2:28 AM.    Primary care provider: Marva Arteaga D.O.  Means of arrival: walk-in  History obtained from: patient  History limited by: none    CHIEF COMPLAINT  Chief Complaint   Patient presents with   • Sent by MD     seen at urgent care.  states he needs CT scan for possible, \"colon infection.\"   • Diarrhea     this am with small amount of blood in stool       HPI  Vernon Saldaña is a 61 y.o. male who presents to the Emergency Department for diarrhea, nausea, and vomiting onset yesterday with associated bright red bloody stool and lower abdominal pain with bowel movements onset this morning. Patient has no past history of illness in his abdomen. Patient had a colonoscopy in October, reportedly normal. He has experienced no recent abdominal pain however tenderness to the left side with palpation was discovered today at Urgent Care and he was referred to the ER for evaluation of \"colon infection\". He has a history of a cyst in his liver and a hernia in his esophagus. Patient uses a CPAP machine whenever he sleeps. No complaints of fever and he has taken no anti-biotics in the last month.    REVIEW OF SYSTEMS  Pertinent positives include nausea, vomiting, diarrhea, abdominal pain, bloody stool. Pertinent negatives include no fever. As above, all other systems reviewed and are negative.   See HPI for further details.     PAST MEDICAL HISTORY  Past Medical History   Diagnosis Date   • Hypercholesteremia    • HTN (hypertension) 8/19/2013   • Preop cardiovascular exam 8/19/2013   • Snoring    • ASTHMA      inhalers   • Arthritis      all joints (osteoarthritis)   • Cholesterol blood decreased    • Sleep apnea      cpap    • PAF (paroxysmal atrial fibrillation) (CMS-HCC) 8/19/2013   • Arrhythmia 2013     A Flutter   • High cholesterol    • Heart burn    • Pain 2/10/14     10/10 right shoulder   • Enlarged prostate    • Pneumonia " 90's       SURGICAL HISTORY  Past Surgical History   Procedure Laterality Date   • Knee arthroscopy  8/14/08     Performed by ANDERSON PLASCENCIA at SURGERY SAME DAY [facility]   • Medial meniscectomy  8/14/08     Performed by ANDERSON PLASCENCIA at SURGERY [facility] DAY [facility]   • Other orthopedic surgery  1987     right knee   • Knee unicompartmental  10/21/2013     Performed by Dave Knox M.D. at SURGERY [facility]   • Shoulder arthroscopy w/ rotator cuff repair  2/17/2014     Performed by Dave Knox M.D. at SURGERY [facility]   • Full thickness block resection  4/30/2014     Performed by Isacc Ramírez M.D. at SURGERY [facility]   • Biopsy general  4/30/2014     left eye lid cyst removal   • Knee arthroplasty total Right 5/11/2015     Procedure: KNEE ARTHROPLASTY TOTAL;  Surgeon: Dave Knox M.D.;  Location: SURGERY [facility];  Service:    • Knee manipulation Right 6/29/2015     Procedure: KNEE MANIPULATION;  Surgeon: Dave Knox M.D.;  Location: SURGERY SAME DAY [facility];  Service:    • Pterygium excision Left 11/11/2015     Procedure: PTERYGIUM EXCISION W/AMNIOGRAFT AND MITOMYCIN;  Surgeon: Homer Parsons M.D.;  Location: SURGERY [facility];  Service:    • Pterygium excision Right 3/8/2016     Procedure: PTERYGIUM EXCISION;  Surgeon: Homer Parsons M.D.;  Location: SURGERY [facility];  Service:        SOCIAL HISTORY  Social History   Substance Use Topics   • Smoking status: Former Smoker -- 1.00 packs/day for 13 years     Types: Cigarettes     Start date: 01/01/1967     Quit date: 01/01/1980   • Smokeless tobacco: Never Used   • Alcohol Use: No      History   Drug Use No       FAMILY HISTORY  Family History   Problem Relation Age of Onset   • Heart Disease Father    • Diabetes Father    • Heart Disease Sister      surgery at age 10   • Stroke     • Heart Attack Mother 77       CURRENT MEDICATIONS      •  tramadol (ULTRAM) 50 MG Tab,  Take 50 mg by mouth every four hours as needed., Disp: , Rfl:   •  FLUCELVAX QUADRIVALENT 0.5 ML Suspension Prefilled Syringe injection, TO BE ADMINISTERED BY PHARMACIST FOR IMMUNIZATION, Disp: , Rfl: 0  •  metoprolol SR (TOPROL XL) 25 MG TABLET SR 24 HR, Take 25 mg by mouth every day. FOR BLOOD PRESSURE, Disp: , Rfl: 5  •  naproxen (ALEVE) 220 MG tablet, Take 220 mg by mouth 2 times a day, with meals., Disp: , Rfl:   •  cyclobenzaprine (FLEXERIL) 10 MG Tab, Take 10 mg by mouth 3 times a day as needed., Disp: , Rfl:   •  omeprazole (PRILOSEC) 20 MG delayed-release capsule, Take 20 mg by mouth every day., Disp: , Rfl:   •  propafenone (RYTHMOL) 225 MG tablet, TAKE 1 TABLET BY MOUTH EVERY 8 HOURS, Disp: 270 Tab, Rfl: 3  •  montelukast (SINGULAIR) 10 MG Tab, Take 10 mg by mouth every day., Disp: , Rfl:   •  Albuterol Sulfate 108 (90 BASE) MCG/ACT AEROSOL POWDER, BREATH ACTIVATED, Inhale  by mouth., Disp: , Rfl:   •  loratadine (CLARITIN) 10 MG Tab, Take 10 mg by mouth every day., Disp: , Rfl:   •  naproxen (ANAPROX) 220 MG tablet, Take 220 mg by mouth 2 times a day, with meals., Disp: , Rfl:   •  ranitidine (ZANTAC) 150 MG Tab, Take 150 mg by mouth 2 times a day., Disp: , Rfl:   •  azelastine (ASTELIN) 137 MCG/SPRAY nasal spray, USE 1-2 SPRAYS TO EACH NOSTRIL TWICE DAY, Disp: 30 Spray, Rfl: 3  •  Difluprednate 0.05 % Emulsion, by Ophthalmic route., Disp: , Rfl:   •  aspirin (ASA) 81 MG Chew Tab chewable tablet, Take 1 Tab by mouth every day., Disp: 100 Tab, Rfl: 11  •  albuterol (VENTOLIN OR PROVENTIL) 108 (90 BASE) MCG/ACT AERS inhalation aerosol, Inhale 2 Puffs by mouth every 6 hours as needed for Shortness of Breath., Disp: , Rfl:   •  AZELASTINE HCL NA, Spray 2 Sprays in nose 2 Times a Day., Disp: , Rfl:   •  metoprolol SR (TOPROL XL) 50 MG TB24, Take 1 Tab by mouth every day. (Patient taking differently: Take 50 mg by mouth every bedtime.), Disp: 90 Tab, Rfl: 3  •  tamsulosin (FLOMAX) 0.4 MG capsule, Take 0.4 mg  "by mouth every bedtime., Disp: , Rfl:   •  atorvastatin (LIPITOR) 20 MG TABS, Take 20 mg by mouth every evening., Disp: , Rfl:       ALLERGIES  No Known Allergies    PHYSICAL EXAM  VITAL SIGNS: /85 mmHg  Pulse 72  Temp(Src) 36.6 °C (97.9 °F)  Resp 16  Ht 1.575 m (5' 2\")  Wt 95.6 kg (210 lb 12.2 oz)  BMI 38.54 kg/m2  SpO2 97%  Vitals reviewed.    Consitutional: Well-developed, well-nourished. Negative for: distress.  HENT: Normocephalic, right external ear normal, left external ear normal, oropharynx clear and moist.  Eyes: Conjunctivae normal, extraocular movements normal. Negative for: discharge in right and left eye, icterus.  Neck: Range of motion normal, supple. Negative for cervical adenopathy.  Cardiovascular: Normal rate, regular rhythm, heart sounds normal, intact distal pulses. Negative for: murmur, rub, gallop.  Pulmonary/Chest Wall: Effort normal, breath sounds normal. Negative for: respiratory distress, wheezes, rales, rhonchi.   Abdominal: Soft, bowel sounds normal. Tenderness to LUQ and left umbilicus, Negative for: distention, rebound, guarding.  Musculoskeletal: Normal range of motion. Negative for edema.  Neurological: Alert and oriented x3. No focal deficits.  Skin: Warm, dry. Negative for rash.  Psych: Mood/affect normal, behavior normal, judgment normal.      DIAGNOSTIC STUDIES / PROCEDURES    LABS  Results for orders placed or performed during the hospital encounter of 03/09/17   CBC WITH DIFFERENTIAL   Result Value Ref Range    WBC 6.3 4.8 - 10.8 K/uL    RBC 5.54 4.70 - 6.10 M/uL    Hemoglobin 16.9 14.0 - 18.0 g/dL    Hematocrit 50.4 42.0 - 52.0 %    MCV 91.0 81.4 - 97.8 fL    MCH 30.5 27.0 - 33.0 pg    MCHC 33.5 (L) 33.7 - 35.3 g/dL    RDW 41.5 35.9 - 50.0 fL    Platelet Count 209 164 - 446 K/uL    MPV 10.2 9.0 - 12.9 fL    Neutrophils-Polys 43.40 (L) 44.00 - 72.00 %    Lymphocytes 42.70 (H) 22.00 - 41.00 %    Monocytes 8.00 0.00 - 13.40 %    Eosinophils 5.10 0.00 - 6.90 %    " Basophils 0.50 0.00 - 1.80 %    Immature Granulocytes 0.30 0.00 - 0.90 %    Nucleated RBC 0.00 /100 WBC    Neutrophils (Absolute) 2.72 1.82 - 7.42 K/uL    Lymphs (Absolute) 2.67 1.00 - 4.80 K/uL    Monos (Absolute) 0.50 0.00 - 0.85 K/uL    Eos (Absolute) 0.32 0.00 - 0.51 K/uL    Baso (Absolute) 0.03 0.00 - 0.12 K/uL    Immature Granulocytes (abs) 0.02 0.00 - 0.11 K/uL    NRBC (Absolute) 0.00 K/uL   COMP METABOLIC PANEL   Result Value Ref Range    Sodium 137 135 - 145 mmol/L    Potassium 4.1 3.6 - 5.5 mmol/L    Chloride 103 96 - 112 mmol/L    Co2 27 20 - 33 mmol/L    Anion Gap 7.0 0.0 - 11.9    Glucose 98 65 - 99 mg/dL    Bun 22 8 - 22 mg/dL    Creatinine 1.00 0.50 - 1.40 mg/dL    Calcium 9.9 8.5 - 10.5 mg/dL    AST(SGOT) 20 12 - 45 U/L    ALT(SGPT) 26 2 - 50 U/L    Alkaline Phosphatase 61 30 - 99 U/L    Total Bilirubin 1.0 0.1 - 1.5 mg/dL    Albumin 4.1 3.2 - 4.9 g/dL    Total Protein 7.7 6.0 - 8.2 g/dL    Globulin 3.6 (H) 1.9 - 3.5 g/dL    A-G Ratio 1.1 g/dL   LIPASE   Result Value Ref Range    Lipase 11 11 - 82 U/L   ESTIMATED GFR   Result Value Ref Range    GFR If African American >60 >60 mL/min/1.73 m 2    GFR If Non African American >60 >60 mL/min/1.73 m 2       All labs reviewed by me    RADIOLOGY  CT-ABDOMEN-PELVIS WITH    (Results Pending)     The radiologist's interpretation of all radiological studies have been reviewed by me.    COURSE & MEDICAL DECISION MAKING  Nursing notes, VS, PMSFHx reviewed in chart.    2:28 AM Patient seen and examined at bedside. The patient presents with bloody diarrhea and left upper quadrant tenderness and the differential diagnosis includes but is not limited to colitis, diverticulitis gastroenteritis with hemorrhoid bleeding. Ordered abdomen pelvis CT, CBC, CMP, lipase. Patient will be treated with 1000ml NS for his symptoms.      3:56 AM 3 calls to radiology later, patient is next to be scanned. I have ordered Cipro and Flagyl. Patient still denies the need for pain  medication at this time. As long as his CAT scan does not indicate anything other than diverticulitis or colitis, he will be discharged on a week's worth of Flagyl and Cipro to follow up with Dr. Arteaga's office in the next couple of days. If the CAT scan shows something different, there'll be an addendum to this dictation.    FINAL IMPRESSION  1. Colitis, acute          Padmaja DENTON (Scribe), am scribing for, and in the presence of, Evon Farfan M.D..    Electronically signed by: Padmaja Martin (Scribe), 3/9/2017    Evon DENTON M.D. personally performed the services described in this documentation, as scribed by Padmaja Martin in my presence, and it is both accurate and complete.    The note accurately reflects work and decisions made by me.  Evon Farfan  3/9/2017  3:57 AM               CT-ABDOMEN-PELVIS WITH   Final Result      1.  Normal appendix.   2.  No CT evidence for colitis or diverticulitis.   3.  Probable liver cysts, unchanged.   4.  Stable tiny low-density lesion centrally in the spleen, cyst versus hemangioma.   5.  No bowel obstruction or pneumoperitoneum.

## 2017-03-09 NOTE — DISCHARGE INSTRUCTIONS
Colitis  La colitis es mazin inflamación del colon. La colitis puede ser mazin enfermedad breve o de larga duración (crónica). La enfermedad de Crohn y la colitis ulcerosa son 2 tipos de colitis crónica. Requieren tratamiento permanente.  CAUSAS  Hay numerosas causas que originan heaven problema, entre las que se incluyen:  · Virus.  · Gérmenes (bacterias).  · Reacciones a medicamentos.  SÍNTOMAS  · Diarrea.  · Hemorragia intestinal.  · Dolor.  · Fiebre.  · Vómitos.  · Cansancio (fatiga).  · Pérdida de peso.  · Obstrucción intestinal.  DIAGNÓSTICO  El diagnóstico y tratamiento de la colitis se basa en el examen físico y en análisis de adi y heces. También puede ser necesario realizar radiografías y endoscopía. También podrá ser necesario jamal radiografías, tomografía computada y colonoscopía.  TRATAMIENTO  El tratamiento pueden incluir:  · Líquidos por la vena (vía intravenosa).  · Reposo del intestino (no comer ni beber nada antoni cierto tiempo).  · Medicamentos para la diarrea y el dolor.  · Antibióticos.  · Medicamentos con corticoides.  · Cirugía.  INSTRUCCIONES PARA EL CUIDADO DOMICILIARIO  · Descanse lo suficiente.  · Vy gran cantidad de líquido para mantener la orina de ivone elaine o color amarillo pálido.  · Consuma mazin dieta normal y yanira balanceada.  · Comuníquese con el profesional que lo asiste para realizar un seguimiento según las indicaciones.  SOLICITE ATENCIÓN MÉDICA DE INMEDIATO SI:  · Comienza a sentir escalofríos.  · Usted tienen mazin temperatura oral de más de 38,9° C (102° F) y no puede controlarla con medicamentos.  · Siente debilidad extrema, mareos o deshidratación.  · Presenta vómitos repetidas veces.  · Dolor abdominal grave o presenta heces con adi u oscuras.  ESTÉ SEGURO QUE:   · Comprende las instrucciones para el cherelle médica.  · Controlará baron enfermedad.  · Solicitará atención médica de inmediato según las indicaciones.  Document Released: 12/18/2006 Document Revised:  03/11/2013  ExitCare® Patient Information ©2014 [employer].

## 2017-03-09 NOTE — ED AVS SNAPSHOT
Home Care Instructions                                                                                                                Vernon Saldaña   MRN: [id number]    Department:  [facility], Emergency Dept   Date of Visit:  3/8/2017            [facility], Emergency Dept    [address] [place] [address]    Phone:  [phone]      You were seen by     1. Evon Farfan M.D.    2. Wilfredo Marquez M.D.      Your Diagnosis Was     Colitis, acute     K52.9       These are the medications you received during your hospitalization from 03/08/2017 1849 to 03/09/2017 0720     Date/Time Order Dose Route Action    03/09/2017 0248 NS infusion 1,000 mL 1,000 mL Intravenous New Bag    03/09/2017 0502 ciprofloxacin (CIPRO) ivpb 400 mg 400 mg Intravenous New Bag    03/09/2017 0623 metronidazole (FLAGYL) IVPB 500 mg 500 mg Intravenous New Bag    03/09/2017 0406 iohexol (OMNIPAQUE) 350 mg/mL 100 mL Intravenous Given      Follow-up Information     1. Follow up with Marva Arteaga D.O.. Call in 1 day.    Specialty:  Family Medicine    Why:  for re-check Friday or Monday    Contact information    [address]  [facility] [address]  [phone]          2. Schedule an appointment as soon as possible for a visit with [facility], Emergency Dept.    Specialty:  Emergency Medicine    Contact information    [address] [place] [id number] [phone]      Medication Information     Review all of your home medications and newly ordered medications with your primary doctor and/or pharmacist as soon as possible. Follow medication instructions as directed by your doctor and/or pharmacist.     Please keep your complete medication list with you and share with your physician. Update the information when medications are discontinued, doses are changed, or new medications (including over-the-counter products) are added; and carry medication information at  all times in the event of emergency situations.               Medication List      START taking these medications        Instructions    Morning Afternoon Evening Bedtime    ciprofloxacin 500 MG Tabs   Commonly known as:  CIPRO        Take 1 Tab by mouth 2 times a day for 7 days.   Dose:  500 mg                        metronidazole 500 MG Tabs   Commonly known as:  FLAGYL        Take 1 Tab by mouth 3 times a day for 7 days.   Dose:  500 mg                          ASK your doctor about these medications        Instructions    Morning Afternoon Evening Bedtime    * albuterol 108 (90 BASE) MCG/ACT Aers inhalation aerosol        Inhale 2 Puffs by mouth every 6 hours as needed for Shortness of Breath.   Dose:  2 Puff                        * Albuterol Sulfate 108 (90 BASE) MCG/ACT Aepb        Inhale  by mouth.                        aspirin 81 MG Chew chewable tablet   Commonly known as:  ASA        Take 1 Tab by mouth every day.   Dose:  81 mg                        atorvastatin 20 MG Tabs   Commonly known as:  LIPITOR        Take 20 mg by mouth every evening.   Dose:  20 mg                        * azelastine 137 MCG/SPRAY nasal spray   Commonly known as:  ASTELIN        USE 1-2 SPRAYS TO EACH NOSTRIL TWICE DAY                        * AZELASTINE HCL NA        Spray 2 Sprays in nose 2 Times a Day.   Dose:  2 Spray                        cyclobenzaprine 10 MG Tabs   Commonly known as:  FLEXERIL        Take 10 mg by mouth 3 times a day as needed.   Dose:  10 mg                        Difluprednate 0.05 % Emul        by Ophthalmic route.                        FLUCELVAX QUADRIVALENT 0.5 ML Mary injection   Generic drug:  Influenza Vac Subunit Quad        TO BE ADMINISTERED BY PHARMACIST FOR IMMUNIZATION                        loratadine 10 MG Tabs   Commonly known as:  CLARITIN        Take 10 mg by mouth every day.   Dose:  10 mg                        * metoprolol SR 50 MG Tb24   Commonly known as:  TOPROL XL         Take 1 Tab by mouth every day.   Dose:  50 mg                        * metoprolol SR 25 MG Tb24   Commonly known as:  TOPROL XL        Take 25 mg by mouth every day. FOR BLOOD PRESSURE   Dose:  25 mg                        montelukast 10 MG Tabs   Commonly known as:  SINGULAIR        Take 10 mg by mouth every day.   Dose:  10 mg                        * naproxen 220 MG tablet   Commonly known as:  ANAPROX        Take 220 mg by mouth 2 times a day, with meals.   Dose:  220 mg                        * ALEVE 220 MG tablet   Generic drug:  naproxen        Take 220 mg by mouth 2 times a day, with meals.   Dose:  220 mg                        omeprazole 20 MG delayed-release capsule   Commonly known as:  PRILOSEC        Take 20 mg by mouth every day.   Dose:  20 mg                        propafenone 225 MG tablet   Commonly known as:  RYTHMOL        TAKE 1 TABLET BY MOUTH EVERY 8 HOURS                        ranitidine 150 MG Tabs   Commonly known as:  ZANTAC        Take 150 mg by mouth 2 times a day.   Dose:  150 mg                        tamsulosin 0.4 MG capsule   Commonly known as:  FLOMAX        Take 0.4 mg by mouth every bedtime.   Dose:  0.4 mg                        tramadol 50 MG Tabs   Commonly known as:  ULTRAM        Take 50 mg by mouth every four hours as needed.   Dose:  50 mg                        * Notice:  This list has 8 medication(s) that are the same as other medications prescribed for you. Read the directions carefully, and ask your doctor or other care provider to review them with you.         Where to Get Your Medications      You can get these medications from any pharmacy     Bring a paper prescription for each of these medications    - ciprofloxacin 500 MG Tabs  - metronidazole 500 MG Tabs            Procedures and tests performed during your visit     CBC WITH DIFFERENTIAL    COMP METABOLIC PANEL    CONSENT FOR CONTRAST INJECTION    CT-ABDOMEN-PELVIS WITH    ESTIMATED GFR    IV SALINE LOCK     LIPASE    Pulse Ox        Discharge Instructions       Colitis  La colitis es mazin inflamación del colon. La colitis puede ser mazin enfermedad breve o de larga duración (crónica). La enfermedad de Crohn y la colitis ulcerosa son 2 tipos de colitis crónica. Requieren tratamiento permanente.  CAUSAS  Hay numerosas causas que originan heaven problema, entre las que se incluyen:  · Virus.  · Gérmenes (bacterias).  · Reacciones a medicamentos.  SÍNTOMAS  · Diarrea.  · Hemorragia intestinal.  · Dolor.  · Fiebre.  · Vómitos.  · Cansancio (fatiga).  · Pérdida de peso.  · Obstrucción intestinal.  DIAGNÓSTICO  El diagnóstico y tratamiento de la colitis se basa en el examen físico y en análisis de adi y heces. También puede ser necesario realizar radiografías y endoscopía. También podrá ser necesario jamal radiografías, tomografía computada y colonoscopía.  TRATAMIENTO  El tratamiento pueden incluir:  · Líquidos por la vena (vía intravenosa).  · Reposo del intestino (no comer ni beber nada antoni cierto tiempo).  · Medicamentos para la diarrea y el dolor.  · Antibióticos.  · Medicamentos con corticoides.  · Cirugía.  INSTRUCCIONES PARA EL CUIDADO DOMICILIARIO  · Descanse lo suficiente.  · Vy gran cantidad de líquido para mantener la orina de ivone elaine o color amarillo pálido.  · Consuma mazin dieta normal y yanira balanceada.  · Comuníquese con el profesional que lo asiste para realizar un seguimiento según las indicaciones.  SOLICITE ATENCIÓN MÉDICA DE INMEDIATO SI:  · Comienza a sentir escalofríos.  · Usted tienen mazin temperatura oral de más de 38,9° C (102° F) y no puede controlarla con medicamentos.  · Siente debilidad extrema, mareos o deshidratación.  · Presenta vómitos repetidas veces.  · Dolor abdominal grave o presenta heces con adi u oscuras.  ESTÉ SEGURO QUE:   · Comprende las instrucciones para el cherelle médica.  · Controlará baron enfermedad.  · Solicitará atención médica de inmediato según las  indicaciones.  Document Released: 12/18/2006 Document Revised: 03/11/2013  ExitCare® Patient Information ©2014 [employer].            Patient Information     Patient Information    Following emergency treatment: all patient requiring follow-up care must return either to a private physician or a clinic if your condition worsens before you are able to obtain further medical attention, please return to the emergency room.     Billing Information    At [facility], we work to make the billing process streamlined for our patients.  Our Representatives are here to answer any questions you may have regarding your hospital bill.  If you have insurance coverage and have supplied your insurance information to us, we will submit a claim to your insurer on your behalf.  Should you have any questions regarding your bill, we can be reached online or by phone as follows:  Online: You are able pay your bills online or live chat with our representatives about any billing questions you may have. We are here to help Monday - Friday from 8:00am to 7:30pm and 9:00am - 12:00pm on Saturdays.  Please visit [email or web address]  for more information.   Phone:  [phone] or [phone]    Please note that your emergency physician, surgeon, pathologist, radiologist, anesthesiologist, and other specialists are not employed by [facility] and will therefore bill separately for their services.  Please contact them directly for any questions concerning their bills at the numbers below:     Emergency Physician Services:  [phone]  [facility]:  [phone]  [facility]:  [phone]  [facility]:  [phone]    1. Your final bill may vary from the amount quoted upon discharge if all procedures are not complete at that time, or if your doctor has additional procedures of which we are not aware. You will receive an additional bill if you return to the  Emergency Department at [facility] for suture removal regardless of the facility of which the sutures were placed.     2. Please arrange for settlement of this account at the emergency registration.    3. All self-pay accounts are due in full at the time of treatment.  If you are unable to meet this obligation then payment is expected within 4-5 days.     4. If you have had radiology studies (CT, X-ray, Ultrasound, MRI), you have received a preliminary result during your emergency department visit. Please contact the radiology department [phone] to receive a copy of your final result. Please discuss the Final result with your primary physician or with the follow up physician provided.     Crisis Hotline:  [place] Crisis Hotline:  9-965-YDYTYXH or 1-544.534.9598  [place] Crisis Hotline:    1-389.738.2799 or 204-806-9906         ED Discharge Follow Up Questions    1. In order to provide you with very good care, we would like to follow up with a phone call in the next few days.  May we have your permission to contact you?     YES /  NO    2. What is the best phone number to call you? (       )_____-__________    3. What is the best time to call you?      Morning  /  Afternoon  /  Evening                   Patient Signature:  ____________________________________________________________    Date:  ____________________________________________________________      Your appointments     Jun 19, 2017 10:30 AM   FOLLOW UP with Dave Ryan M.D.   [facility] (--)    [address]  [facility] [id number] [phone]

## 2017-03-09 NOTE — ED AVS SNAPSHOT
3/9/2017          Vernon Saldaña  [address] [place] [address]    Dear Vernon:    [facility] wants to ensure your discharge home is safe and you or your loved ones have had all your questions answered regarding your care after you leave the hospital.    You may receive a telephone call within two days of your discharge.  This call is to make certain you understand your discharge instructions as well as ensure we provided you with the best care possible during your stay with us.     The call will only last approximately 3-5 minutes and will be done by a nurse.    Once again, we want to ensure your discharge home is safe and that you have a clear understanding of any next steps in your care.  If you have any questions or concerns, please do not hesitate to contact us, we are here for you.  Thank you for choosing [facility] for your healthcare needs.    Sincerely,    Bari Correa    [facility]

## 2017-03-09 NOTE — PATIENT INSTRUCTIONS
Dolor abdominal   (Abdominal Pain)   El dolor abdominal o dolor en el estómago puede ser causado por muchos factores. El médico decidirá la gravedad de la causa dolor por medio de un examen físico y le solicitará pruebas y radiografías. Muchos de estos casos pueden controlarse y tratarse en casa. La mayor parte de los roddy en la piper abdominal que padecen los niños es funcional. Barataria significa que no  está originado en ninguna enfermedad y que probablemente mejorará sin tratamiento.   INSTRUCCIONES PARA EL CUIDADO EN EL HOGAR  · No tome ni administre laxantes a menos que se lo haya indicado el médico.  · Utilice los medicamentos de venta librado o de prescripción para el dolor, el malestar o la fiebre, según se lo indique el médico.  · [place] medicación para el alivio del dolor sólo si se lo ha indicado el profesional.  · Consuma mazin dieta líquida: caldo, té o agua el tiempo que se lo indique baron médico. Luego podrá gradualmente consumir mazin dieta blanda según lo que tolere cada paciente.  SOLICITE ATENCIÓN MÉDICA DE INMEDIATO SI:  · El dolor persiste.  · Tiene fiebre.  · Presenta vómitos repetidas veces.  · Siente el dolor sólo en algunos sectores del abdomen (vientre). Si se localiza en la piper derecha, posiblemente podría tratarse de apendicitis. En un adulto, si se localiza en la región inferior izquierda del abdomen, podría tratarse de colitis o diverticulitis.  · Hay adi en las heces (deposiciones de color pina brillante o lynda alquitranado).  ASEGÚRESE DE QUE:   · Comprende las instrucciones para el cherelle médica.  · Controlará baron enfermedad.  · Solicitará atención médica de inmediato según las indicaciones.  Document Released: 12/18/2006 Document Revised: 06/18/2013  [employer]® Patient Information ©2014 [email or web address].

## 2017-05-11 ENCOUNTER — HOSPITAL ENCOUNTER (OUTPATIENT)
Dept: LAB | Facility: MEDICAL CENTER | Age: 62
End: 2017-05-11
Attending: FAMILY MEDICINE
Payer: COMMERCIAL

## 2017-05-11 LAB
ALBUMIN SERPL BCP-MCNC: 3.8 G/DL (ref 3.2–4.9)
ALBUMIN/GLOB SERPL: 1.3 G/DL
ALP SERPL-CCNC: 59 U/L (ref 30–99)
ALT SERPL-CCNC: 28 U/L (ref 2–50)
ANION GAP SERPL CALC-SCNC: 4 MMOL/L (ref 0–11.9)
AST SERPL-CCNC: 20 U/L (ref 12–45)
BILIRUB SERPL-MCNC: 0.5 MG/DL (ref 0.1–1.5)
BUN SERPL-MCNC: 20 MG/DL (ref 8–22)
CALCIUM SERPL-MCNC: 9.8 MG/DL (ref 8.5–10.5)
CHLORIDE SERPL-SCNC: 106 MMOL/L (ref 96–112)
CHOLEST SERPL-MCNC: 133 MG/DL (ref 100–199)
CO2 SERPL-SCNC: 29 MMOL/L (ref 20–33)
CREAT SERPL-MCNC: 0.88 MG/DL (ref 0.5–1.4)
GFR SERPL CREATININE-BSD FRML MDRD: >60 ML/MIN/1.73 M 2
GLOBULIN SER CALC-MCNC: 2.9 G/DL (ref 1.9–3.5)
GLUCOSE SERPL-MCNC: 104 MG/DL (ref 65–99)
HDLC SERPL-MCNC: 30 MG/DL
LDLC SERPL CALC-MCNC: 46 MG/DL
POTASSIUM SERPL-SCNC: 4.4 MMOL/L (ref 3.6–5.5)
PROT SERPL-MCNC: 6.7 G/DL (ref 6–8.2)
SODIUM SERPL-SCNC: 139 MMOL/L (ref 135–145)
TRIGL SERPL-MCNC: 285 MG/DL (ref 0–149)

## 2017-05-11 PROCEDURE — 36415 COLL VENOUS BLD VENIPUNCTURE: CPT

## 2017-05-11 PROCEDURE — 80061 LIPID PANEL: CPT

## 2017-05-11 PROCEDURE — 80053 COMPREHEN METABOLIC PANEL: CPT

## 2017-05-28 ENCOUNTER — HOSPITAL ENCOUNTER (EMERGENCY)
Facility: MEDICAL CENTER | Age: 62
End: 2017-05-28
Attending: EMERGENCY MEDICINE
Payer: COMMERCIAL

## 2017-05-28 VITALS
HEIGHT: 62 IN | DIASTOLIC BLOOD PRESSURE: 87 MMHG | RESPIRATION RATE: 20 BRPM | WEIGHT: 210.1 LBS | HEART RATE: 70 BPM | OXYGEN SATURATION: 95 % | TEMPERATURE: 97.7 F | SYSTOLIC BLOOD PRESSURE: 122 MMHG | BODY MASS INDEX: 38.66 KG/M2

## 2017-05-28 DIAGNOSIS — B02.9 HERPES ZOSTER WITHOUT COMPLICATION: ICD-10-CM

## 2017-05-28 LAB
ALBUMIN SERPL BCP-MCNC: 3.9 G/DL (ref 3.2–4.9)
ALBUMIN/GLOB SERPL: 1.4 G/DL
ALP SERPL-CCNC: 48 U/L (ref 30–99)
ALT SERPL-CCNC: 38 U/L (ref 2–50)
ANION GAP SERPL CALC-SCNC: 4 MMOL/L (ref 0–11.9)
AST SERPL-CCNC: 30 U/L (ref 12–45)
BASOPHILS # BLD AUTO: 0.5 % (ref 0–1.8)
BASOPHILS # BLD: 0.03 K/UL (ref 0–0.12)
BILIRUB SERPL-MCNC: 1.1 MG/DL (ref 0.1–1.5)
BUN SERPL-MCNC: 22 MG/DL (ref 8–22)
CALCIUM SERPL-MCNC: 9 MG/DL (ref 8.4–10.2)
CHLORIDE SERPL-SCNC: 103 MMOL/L (ref 96–112)
CO2 SERPL-SCNC: 29 MMOL/L (ref 20–33)
CREAT SERPL-MCNC: 1.1 MG/DL (ref 0.5–1.4)
EOSINOPHIL # BLD AUTO: 0.34 K/UL (ref 0–0.51)
EOSINOPHIL NFR BLD: 5.5 % (ref 0–6.9)
ERYTHROCYTE [DISTWIDTH] IN BLOOD BY AUTOMATED COUNT: 41.5 FL (ref 35.9–50)
GFR SERPL CREATININE-BSD FRML MDRD: >60 ML/MIN/1.73 M 2
GLOBULIN SER CALC-MCNC: 2.8 G/DL (ref 1.9–3.5)
GLUCOSE SERPL-MCNC: 87 MG/DL (ref 65–99)
HCT VFR BLD AUTO: 45.9 % (ref 42–52)
HGB BLD-MCNC: 15.9 G/DL (ref 14–18)
IMM GRANULOCYTES # BLD AUTO: 0.03 K/UL (ref 0–0.11)
IMM GRANULOCYTES NFR BLD AUTO: 0.5 % (ref 0–0.9)
LIPASE SERPL-CCNC: 19 U/L (ref 7–58)
LYMPHOCYTES # BLD AUTO: 1.83 K/UL (ref 1–4.8)
LYMPHOCYTES NFR BLD: 29.5 % (ref 22–41)
MCH RBC QN AUTO: 31.5 PG (ref 27–33)
MCHC RBC AUTO-ENTMCNC: 34.6 G/DL (ref 33.7–35.3)
MCV RBC AUTO: 90.9 FL (ref 81.4–97.8)
MONOCYTES # BLD AUTO: 0.75 K/UL (ref 0–0.85)
MONOCYTES NFR BLD AUTO: 12.1 % (ref 0–13.4)
NEUTROPHILS # BLD AUTO: 3.23 K/UL (ref 1.82–7.42)
NEUTROPHILS NFR BLD: 51.9 % (ref 44–72)
NRBC # BLD AUTO: 0 K/UL
NRBC BLD AUTO-RTO: 0 /100 WBC
PLATELET # BLD AUTO: 184 K/UL (ref 164–446)
PMV BLD AUTO: 10.2 FL (ref 9–12.9)
POTASSIUM SERPL-SCNC: 4.1 MMOL/L (ref 3.6–5.5)
PROT SERPL-MCNC: 6.7 G/DL (ref 6–8.2)
RBC # BLD AUTO: 5.05 M/UL (ref 4.7–6.1)
SODIUM SERPL-SCNC: 136 MMOL/L (ref 135–145)
WBC # BLD AUTO: 6.2 K/UL (ref 4.8–10.8)

## 2017-05-28 PROCEDURE — A9270 NON-COVERED ITEM OR SERVICE: HCPCS | Performed by: EMERGENCY MEDICINE

## 2017-05-28 PROCEDURE — 700102 HCHG RX REV CODE 250 W/ 637 OVERRIDE(OP): Performed by: EMERGENCY MEDICINE

## 2017-05-28 PROCEDURE — 96374 THER/PROPH/DIAG INJ IV PUSH: CPT

## 2017-05-28 PROCEDURE — 96375 TX/PRO/DX INJ NEW DRUG ADDON: CPT

## 2017-05-28 PROCEDURE — 83690 ASSAY OF LIPASE: CPT

## 2017-05-28 PROCEDURE — 80053 COMPREHEN METABOLIC PANEL: CPT

## 2017-05-28 PROCEDURE — 36415 COLL VENOUS BLD VENIPUNCTURE: CPT

## 2017-05-28 PROCEDURE — 700111 HCHG RX REV CODE 636 W/ 250 OVERRIDE (IP): Performed by: EMERGENCY MEDICINE

## 2017-05-28 PROCEDURE — 99284 EMERGENCY DEPT VISIT MOD MDM: CPT

## 2017-05-28 PROCEDURE — 85025 COMPLETE CBC W/AUTO DIFF WBC: CPT

## 2017-05-28 RX ORDER — VALACYCLOVIR HYDROCHLORIDE 1 G/1
1000 TABLET, FILM COATED ORAL 3 TIMES DAILY
Qty: 30 TAB | Refills: 0 | Status: SHIPPED | OUTPATIENT
Start: 2017-05-28 | End: 2017-06-07

## 2017-05-28 RX ORDER — MORPHINE SULFATE 4 MG/ML
4 INJECTION, SOLUTION INTRAMUSCULAR; INTRAVENOUS ONCE
Status: COMPLETED | OUTPATIENT
Start: 2017-05-28 | End: 2017-05-28

## 2017-05-28 RX ORDER — ONDANSETRON 2 MG/ML
4 INJECTION INTRAMUSCULAR; INTRAVENOUS ONCE
Status: COMPLETED | OUTPATIENT
Start: 2017-05-28 | End: 2017-05-28

## 2017-05-28 RX ORDER — OXYCODONE HYDROCHLORIDE AND ACETAMINOPHEN 5; 325 MG/1; MG/1
1-2 TABLET ORAL EVERY 6 HOURS PRN
Qty: 20 TAB | Refills: 0 | Status: SHIPPED | OUTPATIENT
Start: 2017-05-28 | End: 2017-08-26

## 2017-05-28 RX ORDER — OXYCODONE HYDROCHLORIDE AND ACETAMINOPHEN 5; 325 MG/1; MG/1
1 TABLET ORAL ONCE
Status: COMPLETED | OUTPATIENT
Start: 2017-05-28 | End: 2017-05-28

## 2017-05-28 RX ADMIN — OXYCODONE HYDROCHLORIDE AND ACETAMINOPHEN 1 TABLET: 5; 325 TABLET ORAL at 18:31

## 2017-05-28 RX ADMIN — ONDANSETRON 4 MG: 2 INJECTION INTRAMUSCULAR; INTRAVENOUS at 16:07

## 2017-05-28 RX ADMIN — MORPHINE SULFATE 4 MG: 4 INJECTION INTRAVENOUS at 16:07

## 2017-05-28 ASSESSMENT — PAIN SCALES - GENERAL: PAINLEVEL_OUTOF10: 6

## 2017-05-28 NOTE — ED AVS SNAPSHOT
Home Care Instructions                                                                                                                Vernon Saldaña   MRN: [id number]    Department:  [facility], Emergency Dept   Date of Visit:  5/28/2017            [facility], Emergency Dept    [address]    [place] [address]    Phone:  [phone]      You were seen by     Fernie Villar M.D.      Your Diagnosis Was     Herpes zoster without complication     B02.9       These are the medications you received during your hospitalization from 05/28/2017 1502 to 05/28/2017 1835     Date/Time Order Dose Route Action    05/28/2017 1607 morphine (pf) 4 mg/ml injection 4 mg 4 mg Intravenous Given    05/28/2017 1607 ondansetron (ZOFRAN) syringe/vial injection 4 mg 4 mg Intravenous Given    05/28/2017 1831 oxycodone-acetaminophen (PERCOCET) 5-325 MG per tablet 1 Tab 1 Tab Oral Given      Follow-up Information     1. Call Marva Arteaga D.O..    Specialty:  Family Medicine    Why:  Monday     Contact information    [address]  [place] [address]  [phone]        Medication Information     Review all of your home medications and newly ordered medications with your primary doctor and/or pharmacist as soon as possible. Follow medication instructions as directed by your doctor and/or pharmacist.     Please keep your complete medication list with you and share with your physician. Update the information when medications are discontinued, doses are changed, or new medications (including over-the-counter products) are added; and carry medication information at all times in the event of emergency situations.               Medication List      START taking these medications        Instructions    Morning Afternoon Evening Bedtime    oxycodone-acetaminophen 5-325 MG Tabs   Last time this was given:  1 Tab on 5/28/2017  6:31 PM   Commonly known as:  PERCOCET        Take 1-2  Tabs by mouth every 6 hours as needed.   Dose:  1-2 Tab                        valacyclovir 1 GM Tabs   Commonly known as:  VALTREX        Take 1 Tab by mouth 3 times a day for 10 days.   Dose:  1000 mg                          ASK your doctor about these medications        Instructions    Morning Afternoon Evening Bedtime    ALEVE 220 MG tablet   Generic drug:  naproxen        Take 220 mg by mouth every day.   Dose:  220 mg                        aspirin EC 81 MG Tbec   Commonly known as:  ECOTRIN        Take 81 mg by mouth every day.   Dose:  81 mg                        atorvastatin 20 MG Tabs   Commonly known as:  LIPITOR        Take 20 mg by mouth every evening.   Dose:  20 mg                        metoprolol SR 25 MG Tb24   Commonly known as:  TOPROL XL        Take 25 mg by mouth every bedtime. FOR BLOOD PRESSURE   Dose:  25 mg                        montelukast 10 MG Tabs   Commonly known as:  SINGULAIR        Take 10 mg by mouth every day.   Dose:  10 mg                        omeprazole 20 MG delayed-release capsule   Commonly known as:  PRILOSEC        Take 20 mg by mouth every bedtime.   Dose:  20 mg                        propafenone 225 MG tablet   Commonly known as:  RYTHMOL        TAKE 1 TABLET BY MOUTH EVERY 8 HOURS                        ranitidine 150 MG Tabs   Commonly known as:  ZANTAC        Take 150 mg by mouth 2 times a day.   Dose:  150 mg                        tamsulosin 0.4 MG capsule   Commonly known as:  FLOMAX        Take 0.4 mg by mouth every bedtime.   Dose:  0.4 mg                        tramadol 50 MG Tabs   Commonly known as:  ULTRAM        Take 50 mg by mouth every 8 hours. Indications: Moderate to Moderately Severe Pain   Dose:  50 mg                             Where to Get Your Medications      You can get these medications from any pharmacy     Bring a paper prescription for each of these medications    - oxycodone-acetaminophen 5-325 MG Tabs  - valacyclovir 1 GM Tabs               Procedures and tests performed during your visit     CBC WITH DIFFERENTIAL    COMP METABOLIC PANEL    ESTIMATED GFR    LIPASE        Discharge Instructions       Shingles  Shingles, which is also known as herpes zoster, is an infection that causes a painful skin rash and fluid-filled blisters. Shingles is not related to genital herpes, which is a sexually transmitted infection.     Shingles only develops in people who:  · Have had chickenpox.  · Have received the chickenpox vaccine. (This is rare.)  CAUSES  Shingles is caused by varicella-zoster virus (VZV). This is the same virus that causes chickenpox. After exposure to VZV, the virus stays in the body in an inactive (dormant) state. Shingles develops if the virus reactivates. This can happen many years after the initial exposure to VZV. It is not known what causes this virus to reactivate.  RISK FACTORS  People who have had chickenpox or received the chickenpox vaccine are at risk for shingles. Infection is more common in people who:  · Are older than age 50.  · Have a weakened defense (immune) system, such as those with HIV, AIDS, or cancer.  · Are taking medicines that weaken the immune system, such as transplant medicines.  · Are under great stress.  SYMPTOMS  Early symptoms of this condition include itching, tingling, and pain in an area on your skin. Pain may be described as burning, stabbing, or throbbing.  A few days or weeks after symptoms start, a painful red rash appears, usually on one side of the body in a bandlike or beltlike pattern. The rash eventually turns into fluid-filled blisters that break open, scab over, and dry up in about 2-3 weeks.  At any time during the infection, you may also develop:  · A fever.  · Chills.  · A headache.  · An upset stomach.  DIAGNOSIS  This condition is diagnosed with a skin exam. Sometimes, skin or fluid samples are taken from the blisters before a diagnosis is made. These samples are examined under a  microscope or sent to a lab for testing.  TREATMENT  There is no specific cure for this condition. Your health care provider will probably prescribe medicines to help you manage pain, recover more quickly, and avoid long-term problems. Medicines may include:  · Antiviral drugs.  · Anti-inflammatory drugs.  · Pain medicines.  If the area involved is on your face, you may be referred to a specialist, such as an eye doctor (ophthalmologist) or an ear, nose, and throat (ENT) doctor to help you avoid eye problems, chronic pain, or disability.  HOME CARE INSTRUCTIONS  Medicines  · Take medicines only as directed by your health care provider.  · Apply an anti-itch or numbing cream to the affected area as directed by your health care provider.  Blister and Rash Care  · Take a cool bath or apply cool compresses to the area of the rash or blisters as directed by your health care provider. This may help with pain and itching.  · Keep your rash covered with a loose bandage (dressing). Wear loose-fitting clothing to help ease the pain of material rubbing against the rash.  · Keep your rash and blisters clean with mild soap and cool water or as directed by your health care provider.  · Check your rash every day for signs of infection. These include redness, swelling, and pain that lasts or increases.  · Do not pick your blisters.  · Do not scratch your rash.  General Instructions  · Rest as directed by your health care provider.  · Keep all follow-up visits as directed by your health care provider. This is important.  · Until your blisters scab over, your infection can cause chickenpox in people who have never had it or been vaccinated against it. To prevent this from happening, avoid contact with other people, especially:  ¨ Babies.  ¨ Pregnant women.  ¨ Children who have eczema.  ¨ Elderly people who have transplants.  ¨ People who have chronic illnesses, such as leukemia or AIDS.  SEEK MEDICAL CARE IF:  · Your pain is not  relieved with prescribed medicines.  · Your pain does not get better after the rash heals.  · Your rash looks infected. Signs of infection include redness, swelling, and pain that lasts or increases.  SEEK IMMEDIATE MEDICAL CARE IF:  · The rash is on your face or nose.  · You have facial pain, pain around your eye area, or loss of feeling on one side of your face.  · You have ear pain or you have ringing in your ear.  · You have loss of taste.  · Your condition gets worse.     This information is not intended to replace advice given to you by your health care provider. Make sure you discuss any questions you have with your health care provider.     Document Released: 12/18/2006 Document Revised: 01/08/2016 Document Reviewed: 10/29/2015  [employer] Patient Education ©2016 [employer]        Culebrilla  (Shingles)  La culebrilla, también conocida paul herpes zóster, es mazin infección que causa mazin erupción dolorosa en la piel y ampollas llenas de líquido. La culebrilla no está relacionada con el herpes genital, que es mazin enfermedad de transmisión sexual.     Solo se manifiesta en personas que:  · Tuvieron varicela.  · Recibieron la vacuna contra la varicela. ([place] es poco frecuente).  CAUSAS  La causa de la culebrilla es el virus de la varicela zóster (VVZ), el mismo virus que causa la varicela. Después de la exposición al virus de la varicela zóster, [place] permanece en el organismo en un estado inactivo (latente). La culebrilla aparece si el virus se reactiva. [place] puede ocurrir muchos años después de la exposición inicial al virus. No se conocen las causas por las que [place] virus se reactiva.  FACTORES DE RIESGO  Las personas que tuvieron varicela o recibieron la vacuna contra la varicela están en riesgo de tener culebrilla. La infección es más frecuente en las personas que:  · Tienen más de 50 años.  · Tienen el sistema de defensa del organismo (sistema inmunitario) debilitado, paul en los enfermos con  VIH, sida o cáncer.  · Rachelle medicamentos que debilitan el sistema inmunitario, paul los medicamentos para trasplantes.  · Están sometidas a un gran estrés.  SÍNTOMAS  Los primeros síntomas de esta afección pueden incluir picazón, hormigueo o dolor en mazin piper de la piel. Ana dolor se puede describir paul ardor, punzante o pulsátil.  Unos días o semanas después de que comienzan los síntomas, aparece mazin erupción rojiza y dolorosa en un lado del cuerpo en un patrón con forma de tae o de dangelo. Finalmente, la erupción se convierte en ampollas llenas de líquido que se abren, beth costras y se secan en dos o mallory semanas.  En cualquier momento antoni la infección, puede presentar lo siguiente:  · Fiebre.  · Escalofríos.  · Dolor de jordyn.  · Malestar estomacal.  DIAGNÓSTICO  Esta afección se diagnostica con un examen de la piel. En algunos casos, se extraen muestras de piel o del líquido de las ampollas antes de definir el diagnóstico. Estas muestras se examinan con el microscopio o se envían al laboratorio para baron análisis.  TRATAMIENTO  No hay mazin inga específica para esta afección. El médico probablemente le recete medicamentos para ayudarlo a controlar el dolor, a recuperarse más rápido y a evitar problemas a myriam plazo. Entre los medicamentos se incluyen los siguientes:  · Medicamentos antivirales.  · Antiinflamatorios.  · Analgésicos.  Si la piper afectada está en el layla, podrán derivarlo a un especialista, paul un médico especialista en ojos (oftalmólogo) o en oídos, nariz y garganta (otorrinolaringólogo) para evitar problemas oculares, dolor crónico o discapacidad.  INSTRUCCIONES PARA EL CUIDADO EN EL HOGAR  Medicamentos  · [place] los medicamentos solamente paul se lo haya indicado el médico.  · Aplique mazin crema anestésica o mazin para calmar la picazón en la piper afectada según las indicaciones del médico.  Cuidado de la erupción y las ampollas  · [place] un baño de agua fría o aplique compresas frías en la  piper de la erupción o las ampollas paul se lo haya indicado el médico. [place] aliviará el dolor y la picazón.  · Mantenga la piper de la erupción cubierta con mazin venda (vendaje). Use ropa holgada para ayudar a aliviar el dolor del roce con la erupción.  · Mantenga la erupción y las ampollas limpias con jabón suave y agua fresca o paul se lo indique el médico.  · Controle la erupción todos los días para detectar signos de infección. Estos signos incluyen enrojecimiento, hinchazón y dolor que perdura o aumenta.  · No pellizque las ampollas.  · No se rasque la piper de la erupción.  Instrucciones generales  · Richar reposo según las indicaciones del médico.  · Concurra a todas las visitas de control paul se lo haya indicado el médico. [place] es importante.  · Hasta tanto las ampollas formen costras, la infección puede causar varicela en las personas que nunca la tuvieron o no se vacunaron contra la varicela. Para impedir que esto suceda, evite el contacto con otras personas, en especial:  ¨ Bebés.  ¨ Embarazadas.  ¨ Niños que tienen eccema.  ¨ Personas mayores que triana recibido un trasplante.  ¨ Personas con enfermedades crónicas, paul leucemia y sida.  SOLICITE ATENCIÓN MÉDICA SI:  · El dolor no se tamara con los medicamentos prescritos.  · El dolor no mejora después de que la erupción desaparece.  · La erupción parece infectada. Los signos de infección incluyen enrojecimiento, hinchazón y dolor que perdura o aumenta.  SOLICITE ATENCIÓN MÉDICA DE INMEDIATO SI:  · La erupción aparece en el layla o la nariz.  · Tiene dolor en el layla, en la piper de los ojos o tiene pérdida de la sensibilidad en un lado del layla.  · Siente dolor o un zumbido en el oído.  · Tiene pérdida del gusto.  · La afección empeora.     Esta información no tiene paul fin reemplazar el consejo del médico. Asegúrese de hacerle al médico cualquier pregunta que tenga.     Document Released: 09/27/2006 Document Revised: 01/08/2016  [employer]  Patient Education ©2016 [employer] Inc.              Patient Information     Patient Information    Following emergency treatment: all patient requiring follow-up care must return either to a private physician or a clinic if your condition worsens before you are able to obtain further medical attention, please return to the emergency room.     Billing Information    At [facility], we work to make the billing process streamlined for our patients.  Our Representatives are here to answer any questions you may have regarding your hospital bill.  If you have insurance coverage and have supplied your insurance information to us, we will submit a claim to your insurer on your behalf.  Should you have any questions regarding your bill, we can be reached online or by phone as follows:  Online: You are able pay your bills online or live chat with our representatives about any billing questions you may have. We are here to help Monday - Friday from 8:00am to 7:30pm and 9:00am - 12:00pm on Saturdays.  Please visit [email or web address]  for more information.   Phone:  [phone] or [phone]    Please note that your emergency physician, surgeon, pathologist, radiologist, anesthesiologist, and other specialists are not employed by [facility] and will therefore bill separately for their services.  Please contact them directly for any questions concerning their bills at the numbers below:     Emergency Physician Services:  [phone]  [facility]:  [phone]  [facility]:  [phone]  [facility]:  [phone]    1. Your final bill may vary from the amount quoted upon discharge if all procedures are not complete at that time, or if your doctor has additional procedures of which we are not aware. You will receive an additional bill if you return to the Emergency Department at [facility] for suture removal regardless of the facility of  which the sutures were placed.     2. Please arrange for settlement of this account at the emergency registration.    3. All self-pay accounts are due in full at the time of treatment.  If you are unable to meet this obligation then payment is expected within 4-5 days.     4. If you have had radiology studies (CT, X-ray, Ultrasound, MRI), you have received a preliminary result during your emergency department visit. Please contact the radiology department [phone] to receive a copy of your final result. Please discuss the Final result with your primary physician or with the follow up physician provided.     Crisis Hotline:  [place] Crisis Hotline:  6-121-AFFMYDW or 1-740.554.2863  [place] Crisis Hotline:    1-312.590.1678 or 388-509-1338         ED Discharge Follow Up Questions    1. In order to provide you with very good care, we would like to follow up with a phone call in the next few days.  May we have your permission to contact you?     YES /  NO    2. What is the best phone number to call you? (       )_____-__________    3. What is the best time to call you?      Morning  /  Afternoon  /  Evening                   Patient Signature:  ____________________________________________________________    Date:  ____________________________________________________________      Your appointments     Jun 19, 2017 10:30 AM   FOLLOW UP with aDve Ryan M.D.   [facility] (--)    [address]  [address]  [place] [id number] [phone]

## 2017-05-28 NOTE — ED NOTES
"Med rec updated and complete  Allergie reviewed  Pt does take his TRAMADOL 50MG every 8 hours, NOT PRN.  Pt states \"No antibiotics in the last 30 days\".  Pt has RX bottle of his PROPAFENONE 225MG with him.  Pt states \"No vitamins\".    "

## 2017-05-28 NOTE — ED PROVIDER NOTES
ED Provider Note    CHIEF COMPLAINT  No chief complaint on file.      HPI  Vernon Saldaña is a 61 y.o. male with a history of hypertension, hypercholesterolemia, asthma, arthritis, paroxysmal atrial fibrillation, appears who presents with complaints of severe pain to his right back and right abdomen for the past 4 days. The patient noted the pain started on Thursday, and he describes it as sharp, stabbing, and burning in nature. He says the pain is spreading into his right upper abdomen into his right upper back. He is also been having pain radiating into his right thigh and knee. He has had no fever, chills, sore throat, cough, nausea, vomit, or diarrhea. Family notes that these developed a rash to his right back in lower abdomen last night.    REVIEW OF SYSTEMS  See HPI for further details. All other systems are negative.     PAST MEDICAL HISTORY  Past Medical History   Diagnosis Date   • Hypercholesteremia    • HTN (hypertension) 8/19/2013   • Preop cardiovascular exam 8/19/2013   • Snoring    • ASTHMA      inhalers   • Arthritis      all joints (osteoarthritis)   • Cholesterol blood decreased    • Sleep apnea      cpap    • PAF (paroxysmal atrial fibrillation) ([facility]) 8/19/2013   • Arrhythmia 2013     A Flutter   • High cholesterol    • Heart burn    • Pain 2/10/14     10/10 right shoulder   • Enlarged prostate    • Pneumonia 90's       FAMILY HISTORY  Family History   Problem Relation Age of Onset   • Heart Disease Father    • Diabetes Father    • Heart Disease Sister      surgery at age 10   • Stroke     • Heart Attack Mother 77       SOCIAL HISTORY  Social History     Social History   • Marital Status:      Spouse Name: N/A   • Number of Children: N/A   • Years of Education: N/A     Social History Main Topics   • Smoking status: Former Smoker -- 1.00 packs/day for 13 years     Types: Cigarettes     Start date: 01/01/1967     Quit date: 01/01/1980   • Smokeless tobacco: Never Used   • Alcohol  Use: No   • Drug Use: No   • Sexual Activity: Not Asked     Other Topics Concern   • None     Social History Narrative       SURGICAL HISTORY  Past Surgical History   Procedure Laterality Date   • Knee arthroscopy  8/14/08     Performed by ANDERSON PLASCENCIA at SURGERY SAME DAY [facility] ORS   • Medial meniscectomy  8/14/08     Performed by ANDERSON PLASCENCIA at SURGERY SAME DAY [facility] ORS   • Other orthopedic surgery  1987     right knee   • Knee unicompartmental  10/21/2013     Performed by Dave Knox M.D. at SURGERY [facility] ORS   • Shoulder arthroscopy w/ rotator cuff repair  2/17/2014     Performed by Dave Knox M.D. at SURGERY [facility] ORS   • Full thickness block resection  4/30/2014     Performed by Isacc Ramírez M.D. at [facility]   • Biopsy general  4/30/2014     left eye lid cyst removal   • Knee arthroplasty total Right 5/11/2015     Procedure: KNEE ARTHROPLASTY TOTAL;  Surgeon: Dave Knox M.D.;  Location: SURGERY [facility];  Service:    • Knee manipulation Right 6/29/2015     Procedure: KNEE MANIPULATION;  Surgeon: Dave Knox M.D.;  Location: SURGERY SAME DAY [facility] ORS;  Service:    • Pterygium excision Left 11/11/2015     Procedure: PTERYGIUM EXCISION W/AMNIOGRAFT AND MITOMYCIN;  Surgeon: Homer Parsons M.D.;  Location: [facility];  Service:    • Pterygium excision Right 3/8/2016     Procedure: PTERYGIUM EXCISION;  Surgeon: Homer Parsons M.D.;  Location: [facility];  Service:        CURRENT MEDICATIONS  Home Medications     Reviewed by Marilu Canchola (Pharmacy Tech) on 05/28/17 at 1549  Med List Status: Complete    Medication Last Dose Status    aspirin EC (ECOTRIN) 81 MG Tablet Delayed Response 5/28/2017 Active    atorvastatin (LIPITOR) 20 MG TABS 5/27/2017 Active    metoprolol SR (TOPROL XL) 25 MG TABLET SR 24 HR 5/27/2017 Active    montelukast (SINGULAIR) 10 MG Tab 5/28/2017 Active     "naproxen (ALEVE) 220 MG tablet 5/28/2017 Active    omeprazole (PRILOSEC) 20 MG delayed-release capsule 5/27/2017 Active    propafenone (RYTHMOL) 225 MG tablet 5/28/2017 Active    ranitidine (ZANTAC) 150 MG Tab 5/28/2017 Active    tamsulosin (FLOMAX) 0.4 MG capsule 5/27/2017 Active    tramadol (ULTRAM) 50 MG Tab 5/28/2017 Active                ALLERGIES  No Known Allergies    PHYSICAL EXAM  VITAL SIGNS: /87 mmHg  Pulse 65  Temp(Src) 36.5 °C (97.7 °F)  Resp 20  Ht 1.575 m (5' 2\")  Wt 95.3 kg (210 lb 1.6 oz)  BMI 38.42 kg/m2  SpO2 96%  Constitutional: Awake, alert, in no acute distress, Non-toxic appearance, appears mildly uncomfortable.   HENT: Atraumatic. Bilateral external ears normal, mucous membranes moist, throat nonerythematous without exudates, nose is normal.  Eyes: PERRL, EOMI, conjunctiva moist, noninjected.  Neck: Nontender, Normal range of motion, No nuchal rigidity, No stridor.   Lymphatic: No lymphadenopathy noted.   Cardiovascular: Regular rate and rhythm, no murmurs, rubs, gallops.  Thorax & Lungs:  Good breath sounds bilaterally, no wheezes, rales, or retractions.  No chest tenderness.  Abdomen: Bowel sounds normal, Soft, nondistended, there is mild tenderness to the right mid and right lower quadrant, no tenderness to the left abdomen, no rebound, guarding, masses.  Back: No CVA or spinal tenderness.  Extremities: Intact distal pulses, No edema, No tenderness.   Skin: Warm, Dry, there is a erythematous based vesicular rash noted over the right lower back extending into the right lower abdomen and inguinal area.  Musculoskeletal: No joint swelling or tenderness.  Neurologic: Alert & oriented x 3, sensory and motor function normal. No focal deficits.   Psychiatric: Affect normal, Judgment normal, Mood normal.         COURSE & MEDICAL DECISION MAKING  Pertinent Labs & Imaging studies reviewed. (See chart for details)  The patient presents to the above complaints. On examination he appears " have shingles. IV was placed, he was given a dose of morphine and Zofran. He also did complain of diffuse abdominal pain. Laboratory shows a normal CBC with a white count 6200, normal differential, chemistry profile normal, lipase normal. At this time his pain appears to be secondary to the shingles. He will be placed on Valtrex and Percocet. He is told to call his PCP on Monday or Tuesday when the office opens, return to the operating room worsening symptoms, fever, vomiting, difficulty breathing, productive cough, or any other problems.     FINAL IMPRESSION  1. Shingles  2.   3.         Electronically signed by: Fernie Villar, 5/28/2017 4:00 PM

## 2017-05-28 NOTE — ED AVS SNAPSHOT
5/28/2017    Vernon Saldaña  [place] [address]    Dear Vernon:    [facility] wants to ensure your discharge home is safe and you or your loved ones have had all of your questions answered regarding your care after you leave the hospital.    Below is a list of resources and contact information should you have any questions regarding your hospital stay, follow-up instructions, or active medical symptoms.    Questions or Concerns Regarding… Contact   Medical Questions Related to Your Discharge  (7 days a week, 8am-5pm) Contact a Nurse Care Coordinator   [phone]   Medical Questions Not Related to Your Discharge  (24 hours a day / 7 days a week)  Contact the Nurse Health Line   [phone]    Medications or Discharge Instructions Refer to your discharge packet   or contact your [facility] Primary Care Provider   [phone]   Follow-up Appointment(s) Schedule your appointment via zulily   or contact Scheduling [phone]   Billing Review your statement via zulily  or contact Billing [phone]   Medical Records Review your records via zulily   or contact Medical Records [phone]     You may receive a telephone call within two days of discharge. This call is to make certain you understand your discharge instructions and have the opportunity to have any questions answered. You can also easily access your medical information, test results and upcoming appointments via the zulily free online health management tool. You can learn more and sign up at [email or web address]. For assistance setting up your zulily account, please call [phone].    Once again, we want to ensure your discharge home is safe and that you have a clear understanding of any next steps in your care. If you have any questions or concerns, please do not hesitate to contact us, we are here for you. Thank you for choosing [facility] for your healthcare needs.    Sincerely,    Your [facility] Healthcare Team

## 2017-05-28 NOTE — ED AVS SNAPSHOT
[employer] Access Code: Activation code not generated  Current [employer] Status: Active    [email or web address]  A secure, online tool to manage your health information     [employer]’s [employer]® is a secure, online tool that connects you to your personalized health information from the privacy of your home -- day or night - making it very easy for you to manage your healthcare. Once the activation process is completed, you can even access your medical information using the [employer] rhonda, which is available for free in the Apple Rhonda store or Google Play store.     [employer] provides the following levels of access (as shown below):   My Chart Features   [facility] Primary Care Doctor [facility]  Specialists [facility]  Urgent  Care Non-[facility]  Primary Care  Doctor   Email your healthcare team securely and privately 24/7 X X X X   Manage appointments: schedule your next appointment; view details of past/upcoming appointments X      Request prescription refills. X      View recent personal medical records, including lab and immunizations X X X X   View health record, including health history, allergies, medications X X X X   Read reports about your outpatient visits, procedures, consult and ER notes X X X X   See your discharge summary, which is a recap of your hospital and/or ER visit that includes your diagnosis, lab results, and care plan. X X       How to register for [employer]:  1. Go to  [email or web address].  2. Click on the Sign Up Now box, which takes you to the New Member Sign Up page. You will need to provide the following information:  a. Enter your [employer] Access Code exactly as it appears at the top of this page. (You will not need to use this code after you’ve completed the sign-up process. If you do not sign up before the expiration date, you must request a new code.)   b. Enter your date of birth.   c. Enter your home email address.   d. Click Submit, and follow the next screen’s instructions.  3. Create a [employer] ID. This will  be your Use[employer] login ID and cannot be changed, so think of one that is secure and easy to remember.  4. Create a [employer] password. You can change your password at any time.  5. Enter your Password Reset Question and Answer. This can be used at a later time if you forget your password.   6. Enter your e-mail address. This allows you to receive e-mail notifications when new information is available in [employer].  7. Click Sign Up. You can now view your health information.    For assistance activating your [employer] account, call [phone]

## 2017-05-29 NOTE — ED NOTES
Dc instructions and prescriptions given. Pt to f/u with pcp, return for worsening s/s. Family to drive home due to meds recvd in er

## 2017-05-29 NOTE — DISCHARGE INSTRUCTIONS
Shingles  Shingles, which is also known as herpes zoster, is an infection that causes a painful skin rash and fluid-filled blisters. Shingles is not related to genital herpes, which is a sexually transmitted infection.     Shingles only develops in people who:  · Have had chickenpox.  · Have received the chickenpox vaccine. (This is rare.)  CAUSES  Shingles is caused by varicella-zoster virus (VZV). This is the same virus that causes chickenpox. After exposure to VZV, the virus stays in the body in an inactive (dormant) state. Shingles develops if the virus reactivates. This can happen many years after the initial exposure to VZV. It is not known what causes this virus to reactivate.  RISK FACTORS  People who have had chickenpox or received the chickenpox vaccine are at risk for shingles. Infection is more common in people who:  · Are older than age 50.  · Have a weakened defense (immune) system, such as those with HIV, AIDS, or cancer.  · Are taking medicines that weaken the immune system, such as transplant medicines.  · Are under great stress.  SYMPTOMS  Early symptoms of this condition include itching, tingling, and pain in an area on your skin. Pain may be described as burning, stabbing, or throbbing.  A few days or weeks after symptoms start, a painful red rash appears, usually on one side of the body in a bandlike or beltlike pattern. The rash eventually turns into fluid-filled blisters that break open, scab over, and dry up in about 2-3 weeks.  At any time during the infection, you may also develop:  · A fever.  · Chills.  · A headache.  · An upset stomach.  DIAGNOSIS  This condition is diagnosed with a skin exam. Sometimes, skin or fluid samples are taken from the blisters before a diagnosis is made. These samples are examined under a microscope or sent to a lab for testing.  TREATMENT  There is no specific cure for this condition. Your health care provider will probably prescribe medicines to help you  manage pain, recover more quickly, and avoid long-term problems. Medicines may include:  · Antiviral drugs.  · Anti-inflammatory drugs.  · Pain medicines.  If the area involved is on your face, you may be referred to a specialist, such as an eye doctor (ophthalmologist) or an ear, nose, and throat (ENT) doctor to help you avoid eye problems, chronic pain, or disability.  HOME CARE INSTRUCTIONS  Medicines  · Take medicines only as directed by your health care provider.  · Apply an anti-itch or numbing cream to the affected area as directed by your health care provider.  Blister and Rash Care  · Take a cool bath or apply cool compresses to the area of the rash or blisters as directed by your health care provider. This may help with pain and itching.  · Keep your rash covered with a loose bandage (dressing). Wear loose-fitting clothing to help ease the pain of material rubbing against the rash.  · Keep your rash and blisters clean with mild soap and cool water or as directed by your health care provider.  · Check your rash every day for signs of infection. These include redness, swelling, and pain that lasts or increases.  · Do not pick your blisters.  · Do not scratch your rash.  General Instructions  · Rest as directed by your health care provider.  · Keep all follow-up visits as directed by your health care provider. This is important.  · Until your blisters scab over, your infection can cause chickenpox in people who have never had it or been vaccinated against it. To prevent this from happening, avoid contact with other people, especially:  ¨ Babies.  ¨ Pregnant women.  ¨ Children who have eczema.  ¨ Elderly people who have transplants.  ¨ People who have chronic illnesses, such as leukemia or AIDS.  SEEK MEDICAL CARE IF:  · Your pain is not relieved with prescribed medicines.  · Your pain does not get better after the rash heals.  · Your rash looks infected. Signs of infection include redness, swelling, and pain  that lasts or increases.  SEEK IMMEDIATE MEDICAL CARE IF:  · The rash is on your face or nose.  · You have facial pain, pain around your eye area, or loss of feeling on one side of your face.  · You have ear pain or you have ringing in your ear.  · You have loss of taste.  · Your condition gets worse.     This information is not intended to replace advice given to you by your health care provider. Make sure you discuss any questions you have with your health care provider.     Document Released: 12/18/2006 Document Revised: 01/08/2016 Document Reviewed: 10/29/2015  [employer] Interactive Patient Education ©2016 Elsevier Inc.        Culebrilla  (Shingles)  La culebrilla, también conocida paul herpes zóster, es mazin infección que causa mazin erupción dolorosa en la piel y ampollas llenas de líquido. La culebrilla no está relacionada con el herpes genital, que es mazin enfermedad de transmisión sexual.     Solo se manifiesta en personas que:  · Tuvieron varicela.  · Recibieron la vacuna contra la varicela. ([place] es poco frecuente).  CAUSAS  La causa de la culebrilla es el virus de la varicela zóster (VVZ), el mismo virus que causa la varicela. Después de la exposición al virus de la varicela zóster, heaven permanece en el organismo en un estado inactivo (latente). La culebrilla aparece si el virus se reactiva. [place] puede ocurrir muchos años después de la exposición inicial al virus. No se conocen las causas por las que heaven virus se reactiva.  FACTORES DE RIESGO  Las personas que tuvieron varicela o recibieron la vacuna contra la varicela están en riesgo de tener culebrilla. La infección es más frecuente en las personas que:  · Tienen más de 50 años.  · Tienen el sistema de defensa del organismo (sistema inmunitario) debilitado, paul en los enfermos con VIH, sida o cáncer.  · Rachelle medicamentos que debilitan el sistema inmunitario, paul los medicamentos para trasplantes.  · Están sometidas a un gran estrés.  SÍNTOMAS  Los  primeros síntomas de esta afección pueden incluir picazón, hormigueo o dolor en mazin piper de la piel. Ana dolor se puede describir paul ardor, punzante o pulsátil.  Unos días o semanas después de que comienzan los síntomas, aparece mazin erupción rojiza y dolorosa en un lado del cuerpo en un patrón con forma de tae o de dangelo. Finalmente, la erupción se convierte en ampollas llenas de líquido que se abren, beth costras y se secan en dos o mallory semanas.  En cualquier momento antoni la infección, puede presentar lo siguiente:  · Fiebre.  · Escalofríos.  · Dolor de jordyn.  · Malestar estomacal.  DIAGNÓSTICO  Esta afección se diagnostica con un examen de la piel. En algunos casos, se extraen muestras de piel o del líquido de las ampollas antes de definir el diagnóstico. Estas muestras se examinan con el microscopio o se envían al laboratorio para baron análisis.  TRATAMIENTO  No hay mazin inga específica para esta afección. El médico probablemente le recete medicamentos para ayudarlo a controlar el dolor, a recuperarse más rápido y a evitar problemas a myriam plazo. Entre los medicamentos se incluyen los siguientes:  · Medicamentos antivirales.  · Antiinflamatorios.  · Analgésicos.  Si la piper afectada está en el layla, podrán derivarlo a un especialista, paul un médico especialista en ojos (oftalmólogo) o en oídos, nariz y garganta (otorrinolaringólogo) para evitar problemas oculares, dolor crónico o discapacidad.  INSTRUCCIONES PARA EL CUIDADO EN EL HOGAR  Medicamentos  · [place] los medicamentos solamente paul se lo haya indicado el médico.  · Aplique mazin crema anestésica o mazin para calmar la picazón en la piper afectada según las indicaciones del médico.  Cuidado de la erupción y las ampollas  · [place] un baño de agua fría o aplique compresas frías en la piper de la erupción o las ampollas paul se lo haya indicado el médico. [place] aliviará el dolor y la picazón.  · Mantenga la piper de la erupción cubierta con mazin venda  (vendaje). Use ropa holgada para ayudar a aliviar el dolor del roce con la erupción.  · Mantenga la erupción y las ampollas limpias con jabón suave y agua fresca o paul se lo indique el médico.  · Controle la erupción todos los días para detectar signos de infección. Estos signos incluyen enrojecimiento, hinchazón y dolor que perdura o aumenta.  · No pellizque las ampollas.  · No se rasque la piper de la erupción.  Instrucciones generales  · Richar reposo según las indicaciones del médico.  · Concurra a todas las visitas de control paul se lo haya indicado el médico. [place] es importante.  · Hasta tanto las ampollas formen costras, la infección puede causar varicela en las personas que nunca la tuvieron o no se vacunaron contra la varicela. Para impedir que esto suceda, evite el contacto con otras personas, en especial:  ¨ Bebés.  ¨ Embarazadas.  ¨ Niños que tienen eccema.  ¨ Personas mayores que triana recibido un trasplante.  ¨ Personas con enfermedades crónicas, paul leucemia y sida.  SOLICITE ATENCIÓN MÉDICA SI:  · El dolor no se tamara con los medicamentos prescritos.  · El dolor no mejora después de que la erupción desaparece.  · La erupción parece infectada. Los signos de infección incluyen enrojecimiento, hinchazón y dolor que perdura o aumenta.  SOLICITE ATENCIÓN MÉDICA DE INMEDIATO SI:  · La erupción aparece en el layla o la nariz.  · Tiene dolor en el layla, en la piper de los ojos o tiene pérdida de la sensibilidad en un lado del layla.  · Siente dolor o un zumbido en el oído.  · Tiene pérdida del gusto.  · La afección empeora.     Esta información no tiene paul fin reemplazar el consejo del médico. Asegúrese de hacerle al médico cualquier pregunta que tenga.     Document Released: 09/27/2006 Document Revised: 01/08/2016  Elsevier Interactive Patient Education ©2016 Elsevier Inc.

## 2017-05-30 ENCOUNTER — PATIENT OUTREACH (OUTPATIENT)
Dept: HEALTH INFORMATION MANAGEMENT | Facility: OTHER | Age: 62
End: 2017-05-30

## 2017-05-30 NOTE — PROGRESS NOTES
· Placed discharge outreach phone call to pt's daughter Karely s/p pt's ER discharge 5/28/17.  Left voicemail with my contact information and instructions to return my phone call.

## 2017-06-19 ENCOUNTER — OFFICE VISIT (OUTPATIENT)
Dept: CARDIOLOGY | Facility: MEDICAL CENTER | Age: 62
End: 2017-06-19
Payer: COMMERCIAL

## 2017-06-19 VITALS
DIASTOLIC BLOOD PRESSURE: 78 MMHG | HEART RATE: 64 BPM | OXYGEN SATURATION: 97 % | HEIGHT: 62 IN | WEIGHT: 215 LBS | BODY MASS INDEX: 39.56 KG/M2 | SYSTOLIC BLOOD PRESSURE: 110 MMHG

## 2017-06-19 DIAGNOSIS — I10 ESSENTIAL HYPERTENSION: ICD-10-CM

## 2017-06-19 DIAGNOSIS — I48.0 PAF (PAROXYSMAL ATRIAL FIBRILLATION) (HCC): ICD-10-CM

## 2017-06-19 DIAGNOSIS — G47.33 OSA (OBSTRUCTIVE SLEEP APNEA): ICD-10-CM

## 2017-06-19 PROCEDURE — 99214 OFFICE O/P EST MOD 30 MIN: CPT | Performed by: INTERNAL MEDICINE

## 2017-06-19 ASSESSMENT — ENCOUNTER SYMPTOMS
FEVER: 0
NAUSEA: 0
SHORTNESS OF BREATH: 0
DEPRESSION: 0
HEADACHES: 0
CHILLS: 0
PND: 0
COUGH: 0
EYE DISCHARGE: 0
HEARTBURN: 0
PALPITATIONS: 0
DIZZINESS: 0
BLURRED VISION: 0
MYALGIAS: 0
BRUISES/BLEEDS EASILY: 0
NERVOUS/ANXIOUS: 0

## 2017-06-19 NOTE — MR AVS SNAPSHOT
"        Vernon Saldaña   2017 10:30 AM   Office Visit   MRN: [id number]    Department:  [facility]   Dept Phone:  [phone]    Description:  Male : 1955   Provider:  Dave Ryan M.D.           Allergies as of 2017     No Known Allergies      You were diagnosed with     Essential hypertension   [5623683]       PAF (paroxysmal atrial fibrillation) (CMS-[facility])   [630779]       SAUL (obstructive sleep apnea)   [586018]         Vital Signs     Blood Pressure Pulse Height Weight Body Mass Index Oxygen Saturation    110/78 mmHg 64 1.575 m (5' 2.01\") 97.523 kg (215 lb) 39.31 kg/m2 97%    Smoking Status                   Former Smoker           Basic Information     Date Of Birth Sex Race Ethnicity Preferred Language Language for Written Material    1955 Male  or   Origin (Nepalese,Uzbek,Luxembourger,Ignacio, etc) English Nepalese      Your appointments     Dec 27, 2017 10:45 AM   FOLLOW UP with Dave Ryan M.D.   [facility] (--)    [address]  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   2014 - Present    Primary localized osteoarthrosis, lower leg M17.10   2015 - Present    Osteoarthrosis involving lower leg M17.10   2015 - Present    Essential hypertension I10   2015 - Present    Peripheral pterygium, progressive H11.059   2015 - Present    History of " pterygium excision Z98.890   3/8/2016 - Present    Mild intermittent asthma without complication J45.20   1/23/2017 - Present    Allergic rhinitis J30.9   1/23/2017 - Present      Health Maintenance        Date Due Completion Dates    IMM DTaP/Tdap/Td Vaccine (1 - Tdap) 7/8/1973 ---    COLONOSCOPY 7/8/2004 ---    IMM ZOSTER VACCINE 7/8/2014 ---            Current Immunizations     Influenza TIV (IM) 12/20/2016, 10/22/2013  1:48 PM    Pneumococcal polysaccharide vaccine (PPSV-23) 10/22/2013  1:54 PM      Below and/or attached are the medications your provider expects you to take. Review all of your home medications and newly ordered medications with your provider and/or pharmacist. Follow medication instructions as directed by your provider and/or pharmacist. Please keep your medication list with you and share with your provider. Update the information when medications are discontinued, doses are changed, or new medications (including over-the-counter products) are added; and carry medication information at all times in the event of emergency situations     Allergies:  No Known Allergies          Medications  Valid as of: June 19, 2017 - 11:15 AM    Generic Name Brand Name Tablet Size Instructions for use    Aspirin (Tablet Delayed Response) ECOTRIN 81 MG Take 81 mg by mouth every day.        Atorvastatin Calcium (Tab) LIPITOR 20 MG Take 20 mg by mouth every evening.        Metoprolol Succinate (TABLET SR 24 HR) TOPROL XL 25 MG Take 25 mg by mouth every bedtime. FOR BLOOD PRESSURE        Montelukast Sodium (Tab) SINGULAIR 10 MG Take 10 mg by mouth every day.        Naproxen Sodium (Tab) ANAPROX 220 MG Take 220 mg by mouth every day.        Omeprazole (CAPSULE DELAYED RELEASE) PRILOSEC 20 MG Take 20 mg by mouth every bedtime.        Oxycodone-Acetaminophen (Tab) PERCOCET 5-325 MG Take 1-2 Tabs by mouth every 6 hours as needed.        Propafenone HCl (Tab) RYTHMOL 225 MG TAKE 1 TABLET BY MOUTH EVERY 8 HOURS         RaNITidine HCl (Tab) ZANTAC 150 MG Take 150 mg by mouth 2 times a day.        Tamsulosin HCl (Cap) FLOMAX 0.4 MG Take 0.4 mg by mouth every bedtime.        TraMADol HCl (Tab) ULTRAM 50 MG Take 50 mg by mouth every 8 hours. Indications: Moderate to Moderately Severe Pain        .                 Medicines prescribed today were sent to:     [id number] - [place], [place] - [address] [facility] [place]    [address] [facility] [place] [address]    Phone: [phone] Fax: [phone]    Open 24 Hours?: No      Medication refill instructions:       If your prescription bottle indicates you have medication refills left, it is not necessary to call your provider’s office. Please contact your pharmacy and they will refill your medication.    If your prescription bottle indicates you do not have any refills left, you may request refills at any time through one of the following ways: The online [employer] system (except Urgent Care), by calling your provider’s office, or by asking your pharmacy to contact your provider’s office with a refill request. Medication refills are processed only during regular business hours and may not be available until the next business day. Your provider may request additional information or to have a follow-up visit with you prior to refilling your medication.   *Please Note: Medication refills are assigned a new Rx number when refilled electronically. Your pharmacy may indicate that no refills were authorized even though a new prescription for the same medication is available at the pharmacy. Please request the medicine by name with the pharmacy before contacting your provider for a refill.           [employer]ileGuide Access Code: Activation code not generated  Current [employer] Status: Active

## 2017-06-19 NOTE — PROGRESS NOTES
Subjective:   Vernon Saldaña is a 61 y.o. male who presents today in follow-up for paroxysmal atrial fibrillation, Rythmol therapy, and hypertension.  Recent visits to emergency room, one for colitis which improved with outpatient antibiotics and the other 4 shingles in the right upper lumbar distribution.  Both illnesses have resolved with appropriate medical therapy.  He's had no palpitations.  Compliant with CPAP.  No other new medical issues      Past Medical History   Diagnosis Date   • Hypercholesteremia    • HTN (hypertension) 8/19/2013   • Preop cardiovascular exam 8/19/2013   • Snoring    • ASTHMA      inhalers   • Arthritis      all joints (osteoarthritis)   • Cholesterol blood decreased    • Sleep apnea      cpap    • PAF (paroxysmal atrial fibrillation) (CMS-[facility]) 8/19/2013   • Arrhythmia 2013     A Flutter   • High cholesterol    • Heart burn    • Pain 2/10/14     10/10 right shoulder   • Enlarged prostate    • Pneumonia 90's     Past Surgical History   Procedure Laterality Date   • Knee arthroscopy  8/14/08     Performed by ANDERSON PLASCENCIA at SURGERY SAME DAY [facility] ORS   • Medial meniscectomy  8/14/08     Performed by ANDERSON PLASCENCIA at SURGERY SAME DAY [facility] ORS   • Other orthopedic surgery  1987     right knee   • Knee unicompartmental  10/21/2013     Performed by Dave Knox M.D. at SURGERY [facility] ORS   • Shoulder arthroscopy w/ rotator cuff repair  2/17/2014     Performed by Dave Knox M.D. at SURGERY [facility] ORS   • Full thickness block resection  4/30/2014     Performed by Isacc Ramírez M.D. at SURGERY [facility] ORS   • Biopsy general  4/30/2014     left eye lid cyst removal   • Knee arthroplasty total Right 5/11/2015     Procedure: KNEE ARTHROPLASTY TOTAL;  Surgeon: Dave Knox M.D.;  Location: SURGERY [facility];  Service:    • Knee manipulation Right 6/29/2015     Procedure: KNEE MANIPULATION;  Surgeon: Dave Knox M.D.;   Location: SURGERY SAME DAY [facility] ORS;  Service:    • Pterygium excision Left 11/11/2015     Procedure: PTERYGIUM EXCISION W/AMNIOGRAFT AND MITOMYCIN;  Surgeon: Homer Parsons M.D.;  Location: SURGERY SURGICAL [facility] ORS;  Service:    • Pterygium excision Right 3/8/2016     Procedure: PTERYGIUM EXCISION;  Surgeon: Homer Parsons M.D.;  Location: SURGERY [facility] ORS;  Service:      Family History   Problem Relation Age of Onset   • Heart Disease Father    • Diabetes Father    • Heart Disease Sister      surgery at age 10   • Stroke     • Heart Attack Mother 77     History   Smoking status   • Former Smoker -- 1.00 packs/day for 13 years   • Types: Cigarettes   • Start date: 01/01/1967   • Quit date: 01/01/1980   Smokeless tobacco   • Never Used     No Known Allergies  Outpatient Encounter Prescriptions as of 6/19/2017   Medication Sig Dispense Refill   • aspirin EC (ECOTRIN) 81 MG Tablet Delayed Response Take 81 mg by mouth every day.     • metoprolol SR (TOPROL XL) 25 MG TABLET SR 24 HR Take 25 mg by mouth every bedtime. FOR BLOOD PRESSURE  5   • naproxen (ALEVE) 220 MG tablet Take 220 mg by mouth every day.     • omeprazole (PRILOSEC) 20 MG delayed-release capsule Take 20 mg by mouth every bedtime.     • propafenone (RYTHMOL) 225 MG tablet TAKE 1 TABLET BY MOUTH EVERY 8 HOURS 270 Tab 3   • montelukast (SINGULAIR) 10 MG Tab Take 10 mg by mouth every day.     • ranitidine (ZANTAC) 150 MG Tab Take 150 mg by mouth 2 times a day.     • tamsulosin (FLOMAX) 0.4 MG capsule Take 0.4 mg by mouth every bedtime.     • atorvastatin (LIPITOR) 20 MG TABS Take 20 mg by mouth every evening.     • oxycodone-acetaminophen (PERCOCET) 5-325 MG Tab Take 1-2 Tabs by mouth every 6 hours as needed. 20 Tab 0   • tramadol (ULTRAM) 50 MG Tab Take 50 mg by mouth every 8 hours. Indications: Moderate to Moderately Severe Pain       No facility-administered encounter medications on file as of 6/19/2017.     Review of Systems  "  Constitutional: Negative for fever, chills and malaise/fatigue.   Eyes: Negative for blurred vision and discharge.   Respiratory: Negative for cough and shortness of breath.    Cardiovascular: Negative for chest pain, palpitations, leg swelling and PND.   Gastrointestinal: Negative for heartburn and nausea.   Genitourinary: Negative for dysuria and urgency.   Musculoskeletal: Positive for joint pain. Negative for myalgias.   Skin: Positive for rash. Negative for itching.   Neurological: Negative for dizziness and headaches.   Endo/Heme/Allergies: Negative for environmental allergies. Does not bruise/bleed easily.   Psychiatric/Behavioral: Negative for depression. The patient is not nervous/anxious.         Objective:   /78 mmHg  Pulse 64  Ht 1.575 m (5' 2.01\")  Wt 97.523 kg (215 lb)  BMI 39.31 kg/m2  SpO2 97%    Physical Exam   Constitutional: He is oriented to person, place, and time. He appears well-developed and well-nourished.   HENT:   Head: Normocephalic and atraumatic.   Eyes: Conjunctivae and EOM are normal. No scleral icterus.   Neck: Neck supple. No JVD present. No thyromegaly present.   Cardiovascular: Normal rate, regular rhythm and normal heart sounds.  Exam reveals no gallop and no friction rub.    No murmur heard.  Pulmonary/Chest: Effort normal and breath sounds normal. No respiratory distress. He has no wheezes. He has no rales. He exhibits no tenderness.   Abdominal: Soft. Bowel sounds are normal. He exhibits no distension and no mass. There is no tenderness.   Truncal obesity   Musculoskeletal: He exhibits no edema.   Neurological: He is alert and oriented to person, place, and time. Coordination normal.   Skin: Skin is warm and dry. No rash noted. No pallor.   Psychiatric: He has a normal mood and affect. His behavior is normal. Judgment and thought content normal.       Assessment:     1. Essential hypertension     2. PAF (paroxysmal atrial fibrillation) (CMS-HCC)     3. SAUL " (obstructive sleep apnea)         Medical Decision Making:  Today's Assessment / Status / Plan:   Cardiac status is stable  Compliant with medications and CPAP  Continue same meds.  Return in 6 months

## 2017-06-19 NOTE — Clinical Note
[facility]   [address]   [address]  [place] [address]  Phone: [phone]  Fax: [phone]              Vernon Saldaña  1955    Encounter Date: 6/19/2017    Dave Ryan M.D.          PROGRESS NOTE:  Subjective:   Vernon Saldaña is a 61 y.o. male who presents today in follow-up for paroxysmal atrial fibrillation, Rythmol therapy, and hypertension.  Recent visits to emergency room, one for colitis which improved with outpatient antibiotics and the other 4 shingles in the right upper lumbar distribution.  Both illnesses have resolved with appropriate medical therapy.  He's had no palpitations.  Compliant with CPAP.  No other new medical issues      Past Medical History   Diagnosis Date   • Hypercholesteremia    • HTN (hypertension) 8/19/2013   • Preop cardiovascular exam 8/19/2013   • Snoring    • ASTHMA      inhalers   • Arthritis      all joints (osteoarthritis)   • Cholesterol blood decreased    • Sleep apnea      cpap    • PAF (paroxysmal atrial fibrillation) (CMS-HCC) 8/19/2013   • Arrhythmia 2013     A Flutter   • High cholesterol    • Heart burn    • Pain 2/10/14     10/10 right shoulder   • Enlarged prostate    • Pneumonia 90's     Past Surgical History   Procedure Laterality Date   • Knee arthroscopy  8/14/08     Performed by ANDERSON PLASCENCIA at SURGERY SAME DAY [facility] ORS   • Medial meniscectomy  8/14/08     Performed by ANDERSON PLASCENCIA at SURGERY SAME DAY [facility] ORS   • Other orthopedic surgery  1987     right knee   • Knee unicompartmental  10/21/2013     Performed by Dave Knox M.D. at SURGERY [facility] ORS   • Shoulder arthroscopy w/ rotator cuff repair  2/17/2014     Performed by Dave Knox M.D. at SURGERY [facility] ORS   • Full thickness block resection  4/30/2014     Performed by Isacc Ramírez M.D. at SURGERY SURGICAL [facility] ORS   • Biopsy general  4/30/2014     left eye lid  cyst removal   • Knee arthroplasty total Right 5/11/2015     Procedure: KNEE ARTHROPLASTY TOTAL;  Surgeon: Dave Knox M.D.;  Location: SURGERY [facility] ORS;  Service:    • Knee manipulation Right 6/29/2015     Procedure: KNEE MANIPULATION;  Surgeon: Dave Knox M.D.;  Location: SURGERY SAME DAY [facility] ORS;  Service:    • Pterygium excision Left 11/11/2015     Procedure: PTERYGIUM EXCISION W/AMNIOGRAFT AND MITOMYCIN;  Surgeon: Homer Parsons M.D.;  Location: SURGERY SURGICAL [facility] ORS;  Service:    • Pterygium excision Right 3/8/2016     Procedure: PTERYGIUM EXCISION;  Surgeon: Homer Parsons M.D.;  Location: SURGERY SURGICAL [facility] ORS;  Service:      Family History   Problem Relation Age of Onset   • Heart Disease Father    • Diabetes Father    • Heart Disease Sister      surgery at age 10   • Stroke     • Heart Attack Mother 77     History   Smoking status   • Former Smoker -- 1.00 packs/day for 13 years   • Types: Cigarettes   • Start date: 01/01/1967   • Quit date: 01/01/1980   Smokeless tobacco   • Never Used     No Known Allergies  Outpatient Encounter Prescriptions as of 6/19/2017   Medication Sig Dispense Refill   • aspirin EC (ECOTRIN) 81 MG Tablet Delayed Response Take 81 mg by mouth every day.     • metoprolol SR (TOPROL XL) 25 MG TABLET SR 24 HR Take 25 mg by mouth every bedtime. FOR BLOOD PRESSURE  5   • naproxen (ALEVE) 220 MG tablet Take 220 mg by mouth every day.     • omeprazole (PRILOSEC) 20 MG delayed-release capsule Take 20 mg by mouth every bedtime.     • propafenone (RYTHMOL) 225 MG tablet TAKE 1 TABLET BY MOUTH EVERY 8 HOURS 270 Tab 3   • montelukast (SINGULAIR) 10 MG Tab Take 10 mg by mouth every day.     • ranitidine (ZANTAC) 150 MG Tab Take 150 mg by mouth 2 times a day.     • tamsulosin (FLOMAX) 0.4 MG capsule Take 0.4 mg by mouth every bedtime.     • atorvastatin (LIPITOR) 20 MG TABS Take 20 mg by mouth every evening.     • oxycodone-acetaminophen (PERCOCET) 5-325  "MG Tab Take 1-2 Tabs by mouth every 6 hours as needed. 20 Tab 0   • tramadol (ULTRAM) 50 MG Tab Take 50 mg by mouth every 8 hours. Indications: Moderate to Moderately Severe Pain       No facility-administered encounter medications on file as of 6/19/2017.     Review of Systems   Constitutional: Negative for fever, chills and malaise/fatigue.   Eyes: Negative for blurred vision and discharge.   Respiratory: Negative for cough and shortness of breath.    Cardiovascular: Negative for chest pain, palpitations, leg swelling and PND.   Gastrointestinal: Negative for heartburn and nausea.   Genitourinary: Negative for dysuria and urgency.   Musculoskeletal: Positive for joint pain. Negative for myalgias.   Skin: Positive for rash. Negative for itching.   Neurological: Negative for dizziness and headaches.   Endo/Heme/Allergies: Negative for environmental allergies. Does not bruise/bleed easily.   Psychiatric/Behavioral: Negative for depression. The patient is not nervous/anxious.         Objective:   /78 mmHg  Pulse 64  Ht 1.575 m (5' 2.01\")  Wt 97.523 kg (215 lb)  BMI 39.31 kg/m2  SpO2 97%    Physical Exam   Constitutional: He is oriented to person, place, and time. He appears well-developed and well-nourished.   HENT:   Head: Normocephalic and atraumatic.   Eyes: Conjunctivae and EOM are normal. No scleral icterus.   Neck: Neck supple. No JVD present. No thyromegaly present.   Cardiovascular: Normal rate, regular rhythm and normal heart sounds.  Exam reveals no gallop and no friction rub.    No murmur heard.  Pulmonary/Chest: Effort normal and breath sounds normal. No respiratory distress. He has no wheezes. He has no rales. He exhibits no tenderness.   Abdominal: Soft. Bowel sounds are normal. He exhibits no distension and no mass. There is no tenderness.   Truncal obesity   Musculoskeletal: He exhibits no edema.   Neurological: He is alert and oriented to person, place, and time. Coordination normal.   "   Skin: Skin is warm and dry. No rash noted. No pallor.   Psychiatric: He has a normal mood and affect. His behavior is normal. Judgment and thought content normal.       Assessment:     1. Essential hypertension     2. PAF (paroxysmal atrial fibrillation) (CMS-HCC)     3. SAUL (obstructive sleep apnea)         Medical Decision Making:  Today's Assessment / Status / Plan:   Cardiac status is stable  Compliant with medications and CPAP  Continue same meds.  Return in 6 months        Marva Arteaga D.O.  [address]  [place] [address]  VIA Facsimile: [phone]

## 2017-06-28 ENCOUNTER — TELEPHONE (OUTPATIENT)
Dept: PULMONOLOGY | Facility: HOSPICE | Age: 62
End: 2017-06-28

## 2017-06-28 DIAGNOSIS — G47.33 OBSTRUCTIVE SLEEP APNEA: ICD-10-CM

## 2017-06-28 NOTE — TELEPHONE ENCOUNTER
Spoke to pts daughter    They are requesting updated order and needed documents to [facility]    They will call if they do not hear anything by next week

## 2017-07-11 ENCOUNTER — PATIENT OUTREACH (OUTPATIENT)
Dept: HEALTH INFORMATION MANAGEMENT | Facility: OTHER | Age: 62
End: 2017-07-11

## 2017-07-11 NOTE — PROGRESS NOTES
Attempt #:1    WebIZ Checked & Epic Updated: yes  HealthConnect Verified: yes  Verify PCP: yes    Communication Preference Obtained: yes     Review Care Team: yes    Annual Wellness Visit Scheduling  1. Scheduling Status:Scheduled    Care Gap Scheduling      Health Maintenance Due   Topic Date Due   • IMM DTaP/Tdap/Td Vaccine (1 - Tdap) SCHEDULED   • COLONOSCOPY  NOT DISCUSSED WITH PT'S DAUGHTER   • IMM ZOSTER VACCINE  SCHEDULED         MyChart Activation: already active -- NOT DISCUSSED WITH PATIENT'S DAUGHTER  MyChart Rhonda: no  Virtual Visits: no  Opt In to Text Messages: no

## 2017-08-24 ENCOUNTER — HOSPITAL ENCOUNTER (OUTPATIENT)
Dept: LAB | Facility: MEDICAL CENTER | Age: 62
End: 2017-08-24
Attending: FAMILY MEDICINE
Payer: COMMERCIAL

## 2017-08-24 LAB
ALBUMIN SERPL BCP-MCNC: 3.8 G/DL (ref 3.2–4.9)
ALBUMIN/GLOB SERPL: 1.3 G/DL
ALP SERPL-CCNC: 39 U/L (ref 30–99)
ALT SERPL-CCNC: 28 U/L (ref 2–50)
ANION GAP SERPL CALC-SCNC: 6 MMOL/L (ref 0–11.9)
AST SERPL-CCNC: 20 U/L (ref 12–45)
BILIRUB SERPL-MCNC: 0.7 MG/DL (ref 0.1–1.5)
BUN SERPL-MCNC: 22 MG/DL (ref 8–22)
CALCIUM SERPL-MCNC: 9.4 MG/DL (ref 8.5–10.5)
CHLORIDE SERPL-SCNC: 107 MMOL/L (ref 96–112)
CHOLEST SERPL-MCNC: 118 MG/DL (ref 100–199)
CO2 SERPL-SCNC: 27 MMOL/L (ref 20–33)
CREAT SERPL-MCNC: 1.13 MG/DL (ref 0.5–1.4)
FASTING STATUS PATIENT QL REPORTED: NORMAL
GFR SERPL CREATININE-BSD FRML MDRD: >60 ML/MIN/1.73 M 2
GLOBULIN SER CALC-MCNC: 3 G/DL (ref 1.9–3.5)
GLUCOSE SERPL-MCNC: 88 MG/DL (ref 65–99)
HDLC SERPL-MCNC: 38 MG/DL
LDLC SERPL CALC-MCNC: 64 MG/DL
POTASSIUM SERPL-SCNC: 4.2 MMOL/L (ref 3.6–5.5)
PROT SERPL-MCNC: 6.8 G/DL (ref 6–8.2)
SODIUM SERPL-SCNC: 140 MMOL/L (ref 135–145)
TRIGL SERPL-MCNC: 78 MG/DL (ref 0–149)

## 2017-08-24 PROCEDURE — 80061 LIPID PANEL: CPT

## 2017-08-24 PROCEDURE — 80053 COMPREHEN METABOLIC PANEL: CPT

## 2017-08-24 PROCEDURE — 36415 COLL VENOUS BLD VENIPUNCTURE: CPT

## 2017-08-26 ENCOUNTER — OFFICE VISIT (OUTPATIENT)
Dept: MEDICAL GROUP | Facility: PHYSICIAN GROUP | Age: 62
End: 2017-08-26
Payer: COMMERCIAL

## 2017-08-26 VITALS
DIASTOLIC BLOOD PRESSURE: 86 MMHG | TEMPERATURE: 97.9 F | HEART RATE: 64 BPM | SYSTOLIC BLOOD PRESSURE: 122 MMHG | OXYGEN SATURATION: 97 % | WEIGHT: 208 LBS | BODY MASS INDEX: 36.86 KG/M2 | HEIGHT: 63 IN

## 2017-08-26 DIAGNOSIS — M19.90 ARTHRITIS: ICD-10-CM

## 2017-08-26 DIAGNOSIS — I48.0 PAF (PAROXYSMAL ATRIAL FIBRILLATION) (HCC): ICD-10-CM

## 2017-08-26 DIAGNOSIS — Q15.9 EYE ANOMALY: ICD-10-CM

## 2017-08-26 DIAGNOSIS — I10 ESSENTIAL HYPERTENSION: ICD-10-CM

## 2017-08-26 DIAGNOSIS — J45.20 MILD INTERMITTENT ASTHMA WITHOUT COMPLICATION: ICD-10-CM

## 2017-08-26 DIAGNOSIS — Z01.810 PREOP CARDIOVASCULAR EXAM: ICD-10-CM

## 2017-08-26 DIAGNOSIS — E78.00 HYPERCHOLESTEREMIA: ICD-10-CM

## 2017-08-26 DIAGNOSIS — Z96.652 STATUS POST LEFT PARTIAL KNEE REPLACEMENT: ICD-10-CM

## 2017-08-26 DIAGNOSIS — Z98.890 HISTORY OF PTERYGIUM EXCISION: ICD-10-CM

## 2017-08-26 DIAGNOSIS — Z98.890 H/O REPAIR OF RIGHT ROTATOR CUFF: ICD-10-CM

## 2017-08-26 DIAGNOSIS — G47.33 OSA (OBSTRUCTIVE SLEEP APNEA): ICD-10-CM

## 2017-08-26 DIAGNOSIS — Z00.00 MEDICARE ANNUAL WELLNESS VISIT, INITIAL: ICD-10-CM

## 2017-08-26 DIAGNOSIS — J30.9 ALLERGIC RHINITIS, UNSPECIFIED ALLERGIC RHINITIS TRIGGER, UNSPECIFIED RHINITIS SEASONALITY: ICD-10-CM

## 2017-08-26 PROCEDURE — G0438 PPPS, INITIAL VISIT: HCPCS | Performed by: NURSE PRACTITIONER

## 2017-08-26 RX ORDER — FENOFIBRATE 145 MG/1
145 TABLET, COATED ORAL DAILY
COMMUNITY

## 2017-08-26 RX ORDER — LORATADINE 10 MG/1
10 TABLET ORAL DAILY
COMMUNITY
End: 2018-12-14

## 2017-08-26 ASSESSMENT — PATIENT HEALTH QUESTIONNAIRE - PHQ9: CLINICAL INTERPRETATION OF PHQ2 SCORE: 0

## 2017-08-26 NOTE — PATIENT INSTRUCTIONS
Continue with care through Marva Arteaga D.O..  Next Medicare Annual Wellness Visit is due in one year.    Continue care with specialists you are seeing for your chronic problems.    Attached is some preventative information for you to read.          Fall Prevention and Home Safety  Falls cause injuries and can affect all age groups. It is possible to prevent falls.   HOW TO PREVENT FALLS  · Wear shoes with rubber soles that do not have an opening for your toes.   · Keep the inside and outside of your house well lit.   · Use night lights throughout your home.   · Remove clutter from floors.   · Clean up floor spills.   · Remove throw rugs or fasten them to the floor with carpet tape.   · Do not place electrical cords across pathways.   · Put grab bars by your tub, shower, and toilet. Do not use towel bars as grab bars.   · Put handrails on both sides of the stairway. Fix loose handrails.   · Do not climb on stools or stepladders, if possible.   · Do not wax your floors.   · Repair uneven or unsafe sidewalks, walkways, or stairs.   · Keep items you use a lot within reach.   · Be aware of pets.   · Keep emergency numbers next to the telephone.   · Put smoke detectors in your home and near bedrooms.   Ask your doctor what other things you can do to prevent falls.  Document Released: 10/14/2010 Document Revised: 06/18/2013 Document Reviewed: 03/19/2013  ExitCare® Patient Information ©2013 [employer].    Exercise to Stay Healthy      Exercise helps you become and stay healthy.  EXERCISE IDEAS AND TIPS  Choose exercises that:  · You enjoy.   · Fit into your day.   You do not need to exercise really hard to be healthy. You can do exercises at a slow or medium level and stay healthy. You can:  · Stretch before and after working out.   · Try yoga, Pilates, or joseph chi.   · Lift weights.   · Walk fast, swim, jog, run, climb stairs, bicycle, dance, or rollerskate.   · Take aerobic classes.   Exercises that burn about 150  calories:  · Running 1 ½ miles in 15 minutes.   · Playing volleyball for 45 to 60 minutes.   · Washing and waxing a car for 45 to 60 minutes.   · Playing touch football for 45 minutes.   · Walking 1 ¾ miles in 35 minutes.   · Pushing a stroller 1 ½ miles in 30 minutes.   · Playing basketball for 30 minutes.   · Raking leaves for 30 minutes.   · Bicycling 5 miles in 30 minutes.   · Walking 2 miles in 30 minutes.   · Dancing for 30 minutes.   · Shoveling snow for 15 minutes.   · Swimming laps for 20 minutes.   · Walking up stairs for 15 minutes.   · Bicycling 4 miles in 15 minutes.   · Gardening for 30 to 45 minutes.   · Jumping rope for 15 minutes.   · Washing windows or floors for 45 to 60 minutes.     One suggestion is to start walking for 10 minutes after each meal.  This will help with digestion and help you to metabolize your meal.       For Weight Loss -   Recommend portion sizes with more fruits/vegetables/high fiber foods.  Stay away from white bread/pastas/white rice/white potatoes.  Increase Fluid intake to 6-8 glasses of water daily.   Eliminate soda's (diet and regular) from your fluid intake.      Document Released: 01/20/2012 Document Revised: 03/11/2013 Document Reviewed: 01/20/2012  ExitCare® Patient Information ©2013 [employer].    Fat and Cholesterol Control Diet  Cholesterol is a wax-like substance. It comes from your liver and is found in certain foods. There is good (HDL) and bad (LDL) cholesterol. Too much cholesterol in your blood can affect your heart. Certain foods can lower or raise your cholesterol. Eat foods that are low in cholesterol.  Saturated and trans fats are bad fats found in foods that will raise your cholesterol. Do not eat foods that are high in saturated and trans fats.  FOODS HIGHER IN SATURATED AND TRANS FATS  · Dairy products, such as whole milk, eggs, cheese, cream, and butter.   · Fatty meats, such as hot dogs, sausage, and salami.   · Fried foods.   · Trans fats which  are found in margarine and pre-made cookies, crackers, and baked goods.   · Tropical oils, such as coconut and palm oils.   Read package labels at the store. Do not buy products that use saturated or trans fats or hydrogenated oils. Find foods labeled:  · Low-fat.   · Low-saturated fat.   · Trans-fat-free.   · Low-cholesterol.   FOODS LOWER IN CHOLESTEROL  ·  Fruit.   · Vegetables.   · Beans, peas, and lentils.   · Fish.   · Lean meat, such as chicken (without skin) or ground turkey.   · Grains, such as barley, rice, couscous, bulgur wheat, and pasta.   · Heart-healthy tub margarine.   PREPARING YOUR FOOD  · Broil, bake, steam, or roast foods. Do not simpson food.   · Use non-stick cooking sprays.   · Use lemon or herbs to flavor food instead of using butter or stick margarine.   · Use nonfat yogurt, salsa, or low-fat dressings for salads.   Document Released: 06/18/2013 Document Reviewed: 06/18/2013  ExitCare® Patient Information ©2013 [employer].    Recommend annual flu vaccine.  Next due in Fall, 2017.  If you decide not to have the flu vaccine, recommend good handwashing and staying out of crowds during flu season.

## 2017-08-26 NOTE — ASSESSMENT & PLAN NOTE
Chronic condition managed with current medical regimen  Stable per review   Continue with current meds  Followed by Marva Arteaga D.O..

## 2017-08-26 NOTE — ASSESSMENT & PLAN NOTE
Chronic condition managed with current medical regimen  Stable per review   Continue with surveillance  Followed by ortho

## 2017-08-26 NOTE — ASSESSMENT & PLAN NOTE
Chronic condition managed with current medical regimen  Stable per review   Continue with current meds prn  Followed by Marva Arteaga D.O..

## 2017-08-26 NOTE — PROGRESS NOTES
CC:   Medicare Annual Wellness Visit    HPI:  Vernon is a 62 y.o. here for Medicare Annual Wellness Visit    Patient Active Problem List    Diagnosis Date Noted   • Mild intermittent asthma without complication 01/23/2017   • Allergic rhinitis 01/23/2017   • Essential hypertension 09/28/2015   • Osteoarthrosis involving lower leg 06/29/2015   • H/O repair of right rotator cuff 02/17/2014   • Status post left partial knee replacement 10/21/2013   • SAUL (obstructive sleep apnea) 10/02/2013   • Hypercholesteremia    • Arthritis    • PAF (paroxysmal atrial fibrillation) (CMS-HCC) 08/19/2013     Current Outpatient Prescriptions   Medication Sig Dispense Refill   • fenofibrate (TRICOR) 145 MG Tab Take 145 mg by mouth every day.     • loratadine (CLARITIN) 10 MG Tab Take 10 mg by mouth every day.     • aspirin EC (ECOTRIN) 81 MG Tablet Delayed Response Take 81 mg by mouth every day.     • metoprolol SR (TOPROL XL) 25 MG TABLET SR 24 HR Take 25 mg by mouth every bedtime. FOR BLOOD PRESSURE  5   • omeprazole (PRILOSEC) 20 MG delayed-release capsule Take 20 mg by mouth every bedtime.     • propafenone (RYTHMOL) 225 MG tablet TAKE 1 TABLET BY MOUTH EVERY 8 HOURS 270 Tab 3   • ranitidine (ZANTAC) 150 MG Tab Take 150 mg by mouth 2 times a day.     • tamsulosin (FLOMAX) 0.4 MG capsule Take 0.4 mg by mouth every bedtime.     • atorvastatin (LIPITOR) 20 MG TABS Take 20 mg by mouth every evening.       No current facility-administered medications for this visit.       Current supplements: no  Chronic narcotic pain medicines: no  Allergies: Review of patient's allergies indicates no known allergies.  Exercise: yes  Current social contact/activities: Has support of family and friends      Screening:  Depression Screening    Little interest or pleasure in doing things?  0 - not at all  Feeling down, depressed , or hopeless? 0 - not at all  Patient Health Questionnaire Score: 0     If depressive symptoms identified deferred to follow up  visit unless specifically addressed in assessment and plan.    Interpretation of PHQ-9 Total Score   Score Severity   1-4 No Depression   5-9 Mild Depression   10-14 Moderate Depression   15-19 Moderately Severe Depression   20-27 Severe Depression    Screening for Cognitive Impairment    Three Minute Recall (apple, watch, jorge)  3/3    Draw clock face with all 12 numbers set to the hand to show 10 minutes past 11 o'clock  1 5  Cognitive concerns identified deferred for follow up unless specifically addressed in assessment and plan.    Fall Risk Assessment    Has the patient had two or more falls in the last year or any fall with injury in the last year?  Yes    Safety Assessment    Throw rugs on floor.  No  Handrails on all stairs.  No  Good lighting in all hallways.  No  Difficulty hearing.  Yes  Patient counseled about all safety risks that were identified.    Functional Assessment ADLs    Are there any barriers preventing you from cooking for yourself or meeting nutritional needs?  No.    Are there any barriers preventing you from driving safely or obtaining transportation?  No.    Are there any barriers preventing you from using a telephone or calling for help?  No.    Are there any barriers preventing you from shopping?  No.    Are there any barriers preventing you from taking care of your own finances?  No.    Are there any barriers preventing you from managing your medications?  No.    Are currently engaging any exercise or physical activity?  Yes.  Works in the [employer]                IMM DTaP/Tdap/Td Vaccine Overdue 7/8/1973     COLONOSCOPY Overdue 7/8/2004     IMM ZOSTER VACCINE Overdue 7/8/2014     IMM INFLUENZA Next Due 9/1/2017      Done 12/20/2016 Imm Admin: Influenza TIV (IM)     Patient has more history with this topic...          Patient Care Team:  Marva Arteaga D.O. as PCP - General (Family Medicine)  Dvae Knox M.D. as Consulting Physician  (Orthopaedics)  Dave Ryan M.D. as Consulting Physician (Cardiology)  Homer Parsons M.D. as Consulting Physician (Ophthalmology)  EDOUARD Lane as Consulting Physician (Audiology)        Social History   Substance Use Topics   • Smoking status: Former Smoker     Packs/day: 1.00     Years: 13.00     Types: Cigarettes     Start date: 1/1/1967     Quit date: 1/1/1980   • Smokeless tobacco: Never Used   • Alcohol use No       Family History   Problem Relation Age of Onset   • Heart Disease Father    • Diabetes Father    • Heart Attack Mother 77   • Heart Disease Sister      surgery at age 10   • Stroke       He  has a past medical history of Arrhythmia (2013); Arthritis; ASTHMA; Cholesterol blood decreased; Enlarged prostate; Heart burn; High cholesterol; HTN (hypertension) (8/19/2013); Hypercholesteremia; PAF (paroxysmal atrial fibrillation) (CMS-HCC) (8/19/2013); Pain (2/10/14); Pneumonia (90's); Preop cardiovascular exam (8/19/2013); Sleep apnea; and Snoring. He also has no past medical history of Encounter for long-term (current) use of other medications.   Past Surgical History:   Procedure Laterality Date   • BIOPSY GENERAL  4/30/2014    left eye lid cyst removal   • FULL THICKNESS BLOCK RESECTION  4/30/2014    Performed by Isacc Ramírez M.D. at SURGERY [facility] ORS   • KNEE ARTHROPLASTY TOTAL Right 5/11/2015    Procedure: KNEE ARTHROPLASTY TOTAL;  Surgeon: Dave Knox M.D.;  Location: SURGERY [facility] ORS;  Service:    • KNEE ARTHROSCOPY  8/14/08    Performed by ANDERSON PLASCENCIA at SURGERY SAME DAY [facility] ORS   • KNEE MANIPULATION Right 6/29/2015    Procedure: KNEE MANIPULATION;  Surgeon: Dave Knox M.D.;  Location: SURGERY SAME DAY [facility] ORS;  Service:    • KNEE UNICOMPARTMENTAL  10/21/2013    Performed by Dave Knox M.D. at SURGERY [facility]   • MEDIAL MENISCECTOMY  8/14/08    Performed by ANDERSON PLASCENCIA at SURGERY SAME DAY [facility] ORS   •  "OTHER ORTHOPEDIC SURGERY  1987    right knee   • PTERYGIUM EXCISION Left 11/11/2015    Procedure: PTERYGIUM EXCISION W/AMNIOGRAFT AND MITOMYCIN;  Surgeon: Homer Parsons M.D.;  Location: [facility];  Service:    • PTERYGIUM EXCISION Right 3/8/2016    Procedure: PTERYGIUM EXCISION;  Surgeon: Homer Parsons M.D.;  Location: [facility];  Service:    • SHOULDER ARTHROSCOPY W/ ROTATOR CUFF REPAIR  2/17/2014    Performed by Dave Knox M.D. at [facility]       ROS:    Ostomy or other tubes or amputations: no  Chronic oxygen use no  Last eye exam 2017  : Denies incontinence.   Gait: Uses no assistive device   Hearing excellent.    Dentition good    Exam: Blood pressure 122/86, pulse 64, temperature 36.6 °C (97.9 °F), height 1.6 m (5' 3\"), weight 94.3 kg (208 lb), SpO2 97 %. Body mass index is 36.85 kg/m².  Alert, oriented in no acute distress.  Eye contact is good, speech goal directed, affect calm      Assessment and Plan. The following treatment and monitoring plan is recommended for all problems as listed below:   Preop cardiovascular exam  Historical data    Hypercholesteremia  Chronic condition managed with current medical regimen  Cholesterol,Tot 8/24/2017 118 100 - 199 mg/dL Final   Triglycerides 78 0 - 149 mg/dL Final   HDL 38  >=40 mg/dL Final   LDL 64 <100 mg/dL Final   Stable per review   Continue with current meds  Followed by Marva Arteaga D.O..        PAF (paroxysmal atrial fibrillation)  Followed by cardiology q 6 months  Denies cardiac sxs  Continue with current medications     Arthritis  Chronic condition managed with current medical regimen  Stable per review   Continue with current meds  prn  Followed by Marva Arteaga D.O..        Status post left partial knee replacement  Per pt surg successful, no pain, good mobility        SAUL (obstructive sleep apnea)  Per pt auto PAP nocly and prn during day for nap  Followed by pulm     H/O repair of " right rotator cuff  Per pt surg successful with minimal discomfort to date    Eye anomaly  Surgical intervention with success    Primary localized osteoarthrosis, lower leg  duplicate    Osteoarthrosis involving lower leg  Chronic condition managed with current medical regimen  Stable per review   Continue with surveillance  Followed by ortho        Essential hypertension  Chronic condition managed with current medical regimen  Stable per review   Continue with current meds  Followed by Marva Arteaga D.O..        History of pterygium excision  No recurrence    Peripheral pterygium, progressive  Surgical intervention with success    Mild intermittent asthma without complication  Chronic condition managed with current medical regimen  Stable per review per pt with auto PAP   Continue with surveillance  Followed by Marva Arteaga D.O..        Allergic rhinitis  Chronic condition managed with current medical regimen  Stable per review   Continue with current meds prn  Followed by Marva Arteaga D.O..          Health Care Screening recommendations reviewed with patient today: per Patient Instructions  DPA/Advanced directive: .has an advanced directive - a copy has been provided    Next office visit for recheck of chronic medical conditions is due with Marva Arteaga D.O. in 6 months

## 2017-08-26 NOTE — ASSESSMENT & PLAN NOTE
Chronic condition managed with current medical regimen  Cholesterol,Tot 8/24/2017 118 100 - 199 mg/dL Final   Triglycerides 78 0 - 149 mg/dL Final   HDL 38  >=40 mg/dL Final   LDL 64 <100 mg/dL Final   Stable per review   Continue with current meds  Followed by Marva Arteaga D.O..

## 2017-08-26 NOTE — ASSESSMENT & PLAN NOTE
Chronic condition managed with current medical regimen  Stable per review per pt with auto PAP   Continue with surveillance  Followed by Marva Arteaga D.O..

## 2017-11-20 ENCOUNTER — HOSPITAL ENCOUNTER (OUTPATIENT)
Dept: LAB | Facility: MEDICAL CENTER | Age: 62
End: 2017-11-20
Attending: FAMILY MEDICINE
Payer: COMMERCIAL

## 2017-11-20 LAB
ALBUMIN SERPL BCP-MCNC: 3.7 G/DL (ref 3.2–4.9)
ALBUMIN/GLOB SERPL: 1.3 G/DL
ALP SERPL-CCNC: 34 U/L (ref 30–99)
ALT SERPL-CCNC: 29 U/L (ref 2–50)
ANION GAP SERPL CALC-SCNC: 7 MMOL/L (ref 0–11.9)
AST SERPL-CCNC: 23 U/L (ref 12–45)
BILIRUB SERPL-MCNC: 0.5 MG/DL (ref 0.1–1.5)
BUN SERPL-MCNC: 21 MG/DL (ref 8–22)
CALCIUM SERPL-MCNC: 9.3 MG/DL (ref 8.5–10.5)
CHLORIDE SERPL-SCNC: 106 MMOL/L (ref 96–112)
CHOLEST SERPL-MCNC: 137 MG/DL (ref 100–199)
CO2 SERPL-SCNC: 27 MMOL/L (ref 20–33)
CREAT SERPL-MCNC: 1.03 MG/DL (ref 0.5–1.4)
GFR SERPL CREATININE-BSD FRML MDRD: >60 ML/MIN/1.73 M 2
GLOBULIN SER CALC-MCNC: 2.9 G/DL (ref 1.9–3.5)
GLUCOSE SERPL-MCNC: 90 MG/DL (ref 65–99)
HDLC SERPL-MCNC: 33 MG/DL
LDLC SERPL CALC-MCNC: 89 MG/DL
POTASSIUM SERPL-SCNC: 4 MMOL/L (ref 3.6–5.5)
PROT SERPL-MCNC: 6.6 G/DL (ref 6–8.2)
SODIUM SERPL-SCNC: 140 MMOL/L (ref 135–145)
TRIGL SERPL-MCNC: 74 MG/DL (ref 0–149)

## 2017-11-20 PROCEDURE — 80053 COMPREHEN METABOLIC PANEL: CPT

## 2017-11-20 PROCEDURE — 36415 COLL VENOUS BLD VENIPUNCTURE: CPT

## 2017-11-20 PROCEDURE — 80061 LIPID PANEL: CPT

## 2017-12-27 ENCOUNTER — OFFICE VISIT (OUTPATIENT)
Dept: CARDIOLOGY | Facility: MEDICAL CENTER | Age: 62
End: 2017-12-27
Payer: COMMERCIAL

## 2017-12-27 VITALS
HEIGHT: 62 IN | SYSTOLIC BLOOD PRESSURE: 94 MMHG | DIASTOLIC BLOOD PRESSURE: 64 MMHG | BODY MASS INDEX: 38.83 KG/M2 | HEART RATE: 68 BPM | OXYGEN SATURATION: 95 % | WEIGHT: 211 LBS

## 2017-12-27 DIAGNOSIS — I10 ESSENTIAL HYPERTENSION: ICD-10-CM

## 2017-12-27 DIAGNOSIS — I48.0 PAF (PAROXYSMAL ATRIAL FIBRILLATION) (HCC): ICD-10-CM

## 2017-12-27 DIAGNOSIS — G47.33 OSA (OBSTRUCTIVE SLEEP APNEA): ICD-10-CM

## 2017-12-27 PROCEDURE — 99214 OFFICE O/P EST MOD 30 MIN: CPT | Performed by: INTERNAL MEDICINE

## 2017-12-27 PROCEDURE — 93000 ELECTROCARDIOGRAM COMPLETE: CPT | Performed by: INTERNAL MEDICINE

## 2017-12-27 ASSESSMENT — ENCOUNTER SYMPTOMS
NERVOUS/ANXIOUS: 0
MYALGIAS: 0
FEVER: 0
CHILLS: 0
HEADACHES: 0
NAUSEA: 0
BRUISES/BLEEDS EASILY: 0
EYE DISCHARGE: 0
COUGH: 0
BLURRED VISION: 0
HEARTBURN: 0
DEPRESSION: 0
DIZZINESS: 0
SHORTNESS OF BREATH: 0
PALPITATIONS: 0
PND: 0

## 2017-12-27 NOTE — PROGRESS NOTES
Subjective:   Vernon Saldaña is a 62 y.o. male who presents today In follow-up for Rythmol therapy for PAF.  For the last several weeks he has mentioned fatigue to his wife.  He is not using his CPAP due to a problem with the machine.  He is due to see pulmonary in February.  He is compliant with medication.    EKG demonstrates atrial fibrillation with moderate ventricular response    Past Medical History:   Diagnosis Date   • Arrhythmia 2013    A Flutter   • Arthritis     all joints (osteoarthritis)   • ASTHMA     inhalers   • Cholesterol blood decreased    • Enlarged prostate    • Heart burn    • High cholesterol    • HTN (hypertension) 8/19/2013   • Hypercholesteremia    • PAF (paroxysmal atrial fibrillation) (CMS-[facility]) 8/19/2013   • Pain 2/10/14    10/10 right shoulder   • Pneumonia 90's   • Preop cardiovascular exam 8/19/2013   • Sleep apnea     cpap    • Snoring      Past Surgical History:   Procedure Laterality Date   • PTERYGIUM EXCISION Right 3/8/2016    Procedure: PTERYGIUM EXCISION;  Surgeon: Homer Parsons M.D.;  Location: SURGERY [facility] ORS;  Service:    • PTERYGIUM EXCISION Left 11/11/2015    Procedure: PTERYGIUM EXCISION W/AMNIOGRAFT AND MITOMYCIN;  Surgeon: Homer Parsons M.D.;  Location: SURGERY [facility] ORS;  Service:    • KNEE MANIPULATION Right 6/29/2015    Procedure: KNEE MANIPULATION;  Surgeon: Dave Knox M.D.;  Location: SURGERY SAME DAY [facility] ORS;  Service:    • KNEE ARTHROPLASTY TOTAL Right 5/11/2015    Procedure: KNEE ARTHROPLASTY TOTAL;  Surgeon: Dave Knox M.D.;  Location: SURGERY [facility] ORS;  Service:    • FULL THICKNESS BLOCK RESECTION  4/30/2014    Performed by Isacc Ramírez M.D. at [facility]   • BIOPSY GENERAL  4/30/2014    left eye lid cyst removal   • SHOULDER ARTHROSCOPY W/ ROTATOR CUFF REPAIR  2/17/2014    Performed by Dave Knox M.D. at [facility] ORS   • KNEE UNICOMPARTMENTAL  10/21/2013     Performed by Dave Knox M.D. at SURGERY [facility] ORS   • KNEE ARTHROSCOPY  8/14/08    Performed by ANDERSON PLASCENCIA at SURGERY SAME DAY [facility] ORS   • MEDIAL MENISCECTOMY  8/14/08    Performed by ANDERSON PLASCENCIA at SURGERY SAME DAY [facility] ORS   • OTHER ORTHOPEDIC SURGERY  1987    right knee     Family History   Problem Relation Age of Onset   • Heart Disease Father    • Diabetes Father    • Heart Attack Mother 77   • Heart Disease Sister      surgery at age 10   • Stroke       History   Smoking Status   • Former Smoker   • Packs/day: 1.00   • Years: 13.00   • Types: Cigarettes   • Start date: 1/1/1967   • Quit date: 1/1/1980   Smokeless Tobacco   • Never Used     No Known Allergies  Outpatient Encounter Prescriptions as of 12/27/2017   Medication Sig Dispense Refill   • propafenone (RYTHMOL) 225 MG tablet TAKE 1 TABLET BY MOUTH EVERY 8 HOURS 270 Tab 2   • fenofibrate (TRICOR) 145 MG Tab Take 145 mg by mouth every day.     • loratadine (CLARITIN) 10 MG Tab Take 10 mg by mouth every day.     • aspirin EC (ECOTRIN) 81 MG Tablet Delayed Response Take 81 mg by mouth every day.     • metoprolol SR (TOPROL XL) 25 MG TABLET SR 24 HR Take 25 mg by mouth every bedtime. FOR BLOOD PRESSURE  5   • omeprazole (PRILOSEC) 20 MG delayed-release capsule Take 20 mg by mouth every bedtime.     • ranitidine (ZANTAC) 150 MG Tab Take 150 mg by mouth 2 times a day.     • tamsulosin (FLOMAX) 0.4 MG capsule Take 0.4 mg by mouth every bedtime.     • atorvastatin (LIPITOR) 20 MG TABS Take 20 mg by mouth every evening.       No facility-administered encounter medications on file as of 12/27/2017.      Review of Systems   Constitutional: Positive for malaise/fatigue. Negative for chills and fever.   Eyes: Negative for blurred vision and discharge.   Respiratory: Negative for cough and shortness of breath.    Cardiovascular: Negative for chest pain, palpitations, leg swelling and PND.   Gastrointestinal: Negative for heartburn and  "nausea.   Genitourinary: Negative for dysuria and urgency.   Musculoskeletal: Positive for joint pain. Negative for myalgias.   Skin: Negative for itching and rash.   Neurological: Negative for dizziness and headaches.   Endo/Heme/Allergies: Negative for environmental allergies. Does not bruise/bleed easily.   Psychiatric/Behavioral: Negative for depression. The patient is not nervous/anxious.         Objective:   BP (!) 94/64   Pulse 68   Ht 1.575 m (5' 2\")   Wt 95.7 kg (211 lb)   SpO2 95%   BMI 38.59 kg/m²     Physical Exam   Constitutional: He is oriented to person, place, and time. He appears well-developed and well-nourished.   HENT:   Head: Normocephalic and atraumatic.   Eyes: Conjunctivae and EOM are normal. No scleral icterus.   Neck: Neck supple. No JVD present. No thyromegaly present.   Cardiovascular: Normal rate and normal heart sounds.  An irregularly irregular rhythm present. Exam reveals no gallop and no friction rub.    No murmur heard.  Pulmonary/Chest: Effort normal and breath sounds normal. No respiratory distress. He has no wheezes. He has no rales. He exhibits no tenderness.   Abdominal: Soft. Bowel sounds are normal. He exhibits no distension and no mass. There is no tenderness.   Truncal obesity   Musculoskeletal: He exhibits no edema.   Neurological: He is alert and oriented to person, place, and time. Coordination normal.   Skin: Skin is warm and dry. No rash noted. No pallor.   Psychiatric: He has a normal mood and affect. His behavior is normal. Judgment and thought content normal.       Assessment:     1. PAF (paroxysmal atrial fibrillation) (CMS-[facility])  [facility] EPIPHANY EKG (Clinic Performed)    REFERRAL TO CARDIAC ELECTROPHYSIOLOGY   2. Essential hypertension     3. SAUL (obstructive sleep apnea)         Medical Decision Making:  Today's Assessment / Status / Plan:   I will refer to electrophysiology for failure of Rythmol therapy for PAF  Continue Rythmol for now  Reestablish treatment " for sleep apnea  Follow up in 6 months

## 2017-12-27 NOTE — LETTER
[facility]   [address]   [address]  [place] [address]  Phone: [phone]  Fax: [phone]              Vernon Saldaña  1955    Encounter Date: 12/27/2017    Dave Ryan M.D.          PROGRESS NOTE:  Subjective:   Vernon Saldaña is a 62 y.o. male who presents today In follow-up for Rythmol therapy for PAF.  For the last several weeks he has mentioned fatigue to his wife.  He is not using his CPAP due to a problem with the machine.  He is due to see pulmonary in February.  He is compliant with medication.    EKG demonstrates atrial fibrillation with moderate ventricular response    Past Medical History:   Diagnosis Date   • Arrhythmia 2013    A Flutter   • Arthritis     all joints (osteoarthritis)   • ASTHMA     inhalers   • Cholesterol blood decreased    • Enlarged prostate    • Heart burn    • High cholesterol    • HTN (hypertension) 8/19/2013   • Hypercholesteremia    • PAF (paroxysmal atrial fibrillation) (CMS-HCC) 8/19/2013   • Pain 2/10/14    10/10 right shoulder   • Pneumonia 90's   • Preop cardiovascular exam 8/19/2013   • Sleep apnea     cpap    • Snoring      Past Surgical History:   Procedure Laterality Date   • PTERYGIUM EXCISION Right 3/8/2016    Procedure: PTERYGIUM EXCISION;  Surgeon: Homer Parsons M.D.;  Location: [facility];  Service:    • PTERYGIUM EXCISION Left 11/11/2015    Procedure: PTERYGIUM EXCISION W/AMNIOGRAFT AND MITOMYCIN;  Surgeon: Homer Parsons M.D.;  Location: [facility];  Service:    • KNEE MANIPULATION Right 6/29/2015    Procedure: KNEE MANIPULATION;  Surgeon: Dave Knox M.D.;  Location: [facility] SAME DAY [facility] ORS;  Service:    • KNEE ARTHROPLASTY TOTAL Right 5/11/2015    Procedure: KNEE ARTHROPLASTY TOTAL;  Surgeon: Dave Knox M.D.;  Location: [facility];  Service:    • FULL THICKNESS BLOCK RESECTION  4/30/2014       Performed by Isacc Ramírez M.D. at SURGERY SURGICAL [facility] ORS   • BIOPSY GENERAL  4/30/2014    left eye lid cyst removal   • SHOULDER ARTHROSCOPY W/ ROTATOR CUFF REPAIR  2/17/2014    Performed by Dave Knox M.D. at SURGERY [facility] ORS   • KNEE UNICOMPARTMENTAL  10/21/2013    Performed by Dave Knox M.D. at SURGERY [facility] ORS   • KNEE ARTHROSCOPY  8/14/08    Performed by ANDERSON PLASCENCIA at SURGERY SAME DAY [facility] ORS   • MEDIAL MENISCECTOMY  8/14/08    Performed by ANDERSON PLASCENCIA at SURGERY SAME DAY [facility] ORS   • OTHER ORTHOPEDIC SURGERY  1987    right knee     Family History   Problem Relation Age of Onset   • Heart Disease Father    • Diabetes Father    • Heart Attack Mother 77   • Heart Disease Sister      surgery at age 10   • Stroke       History   Smoking Status   • Former Smoker   • Packs/day: 1.00   • Years: 13.00   • Types: Cigarettes   • Start date: 1/1/1967   • Quit date: 1/1/1980   Smokeless Tobacco   • Never Used     No Known Allergies  Outpatient Encounter Prescriptions as of 12/27/2017   Medication Sig Dispense Refill   • propafenone (RYTHMOL) 225 MG tablet TAKE 1 TABLET BY MOUTH EVERY 8 HOURS 270 Tab 2   • fenofibrate (TRICOR) 145 MG Tab Take 145 mg by mouth every day.     • loratadine (CLARITIN) 10 MG Tab Take 10 mg by mouth every day.     • aspirin EC (ECOTRIN) 81 MG Tablet Delayed Response Take 81 mg by mouth every day.     • metoprolol SR (TOPROL XL) 25 MG TABLET SR 24 HR Take 25 mg by mouth every bedtime. FOR BLOOD PRESSURE  5   • omeprazole (PRILOSEC) 20 MG delayed-release capsule Take 20 mg by mouth every bedtime.     • ranitidine (ZANTAC) 150 MG Tab Take 150 mg by mouth 2 times a day.     • tamsulosin (FLOMAX) 0.4 MG capsule Take 0.4 mg by mouth every bedtime.     • atorvastatin (LIPITOR) 20 MG TABS Take 20 mg by mouth every evening.       No facility-administered encounter medications on file as of 12/27/2017.      Review of Systems   Constitutional:  "Positive for malaise/fatigue. Negative for chills and fever.   Eyes: Negative for blurred vision and discharge.   Respiratory: Negative for cough and shortness of breath.    Cardiovascular: Negative for chest pain, palpitations, leg swelling and PND.   Gastrointestinal: Negative for heartburn and nausea.   Genitourinary: Negative for dysuria and urgency.   Musculoskeletal: Positive for joint pain. Negative for myalgias.   Skin: Negative for itching and rash.   Neurological: Negative for dizziness and headaches.   Endo/Heme/Allergies: Negative for environmental allergies. Does not bruise/bleed easily.   Psychiatric/Behavioral: Negative for depression. The patient is not nervous/anxious.         Objective:   BP (!) 94/64   Pulse 68   Ht 1.575 m (5' 2\")   Wt 95.7 kg (211 lb)   SpO2 95%   BMI 38.59 kg/m²      Physical Exam   Constitutional: He is oriented to person, place, and time. He appears well-developed and well-nourished.   HENT:   Head: Normocephalic and atraumatic.   Eyes: Conjunctivae and EOM are normal. No scleral icterus.   Neck: Neck supple. No JVD present. No thyromegaly present.   Cardiovascular: Normal rate and normal heart sounds.  An irregularly irregular rhythm present. Exam reveals no gallop and no friction rub.    No murmur heard.  Pulmonary/Chest: Effort normal and breath sounds normal. No respiratory distress. He has no wheezes. He has no rales. He exhibits no tenderness.   Abdominal: Soft. Bowel sounds are normal. He exhibits no distension and no mass. There is no tenderness.   Truncal obesity   Musculoskeletal: He exhibits no edema.   Neurological: He is alert and oriented to person, place, and time. Coordination normal.   Skin: Skin is warm and dry. No rash noted. No pallor.   Psychiatric: He has a normal mood and affect. His behavior is normal. Judgment and thought content normal.       Assessment:     1. PAF (paroxysmal atrial fibrillation) (CMS-[facility])  [facility] EKG (Clinic Performed)   "    REFERRAL TO CARDIAC ELECTROPHYSIOLOGY   2. Essential hypertension     3. SAUL (obstructive sleep apnea)         Medical Decision Making:  Today's Assessment / Status / Plan:   I will refer to electrophysiology for failure of Rythmol therapy for PAF  Continue Rythmol for now  Reestablish treatment for sleep apnea  Follow up in 6 months      Marva Arteaga D.O.  [address]  [facility] [place] [address]  VIA Facsimile: [phone]

## 2017-12-28 LAB — EKG IMPRESSION: NORMAL

## 2018-01-04 ENCOUNTER — TELEPHONE (OUTPATIENT)
Dept: CARDIOLOGY | Facility: MEDICAL CENTER | Age: 63
End: 2018-01-04

## 2018-01-04 ENCOUNTER — HOSPITAL ENCOUNTER (OUTPATIENT)
Facility: MEDICAL CENTER | Age: 63
End: 2018-01-05
Attending: EMERGENCY MEDICINE | Admitting: HOSPITALIST
Payer: COMMERCIAL

## 2018-01-04 ENCOUNTER — APPOINTMENT (OUTPATIENT)
Dept: RADIOLOGY | Facility: MEDICAL CENTER | Age: 63
End: 2018-01-04
Payer: COMMERCIAL

## 2018-01-04 ENCOUNTER — RESOLUTE PROFESSIONAL BILLING HOSPITAL PROF FEE (OUTPATIENT)
Dept: HOSPITALIST | Facility: MEDICAL CENTER | Age: 63
End: 2018-01-04
Payer: COMMERCIAL

## 2018-01-04 ENCOUNTER — APPOINTMENT (OUTPATIENT)
Dept: RADIOLOGY | Facility: MEDICAL CENTER | Age: 63
End: 2018-01-04
Attending: EMERGENCY MEDICINE
Payer: COMMERCIAL

## 2018-01-04 DIAGNOSIS — R07.9 CHEST PAIN, UNSPECIFIED TYPE: ICD-10-CM

## 2018-01-04 DIAGNOSIS — R55 NEAR SYNCOPE: ICD-10-CM

## 2018-01-04 PROBLEM — R42 DIZZINESS: Status: ACTIVE | Noted: 2018-01-04

## 2018-01-04 PROBLEM — I10 HTN (HYPERTENSION): Status: ACTIVE | Noted: 2018-01-04

## 2018-01-04 LAB
ALBUMIN SERPL BCP-MCNC: 4.3 G/DL (ref 3.2–4.9)
ALBUMIN/GLOB SERPL: 1.5 G/DL
ALP SERPL-CCNC: 37 U/L (ref 30–99)
ALT SERPL-CCNC: 26 U/L (ref 2–50)
ANION GAP SERPL CALC-SCNC: 7 MMOL/L (ref 0–11.9)
APTT PPP: 24.6 SEC (ref 24.7–36)
AST SERPL-CCNC: 24 U/L (ref 12–45)
BASOPHILS # BLD AUTO: 0.5 % (ref 0–1.8)
BASOPHILS # BLD: 0.03 K/UL (ref 0–0.12)
BILIRUB SERPL-MCNC: 0.5 MG/DL (ref 0.1–1.5)
BNP SERPL-MCNC: 66 PG/ML (ref 0–100)
BUN SERPL-MCNC: 20 MG/DL (ref 8–22)
CALCIUM SERPL-MCNC: 9.5 MG/DL (ref 8.4–10.2)
CHLORIDE SERPL-SCNC: 105 MMOL/L (ref 96–112)
CO2 SERPL-SCNC: 26 MMOL/L (ref 20–33)
CREAT SERPL-MCNC: 1.23 MG/DL (ref 0.5–1.4)
EKG IMPRESSION: NORMAL
EOSINOPHIL # BLD AUTO: 0.22 K/UL (ref 0–0.51)
EOSINOPHIL NFR BLD: 3.7 % (ref 0–6.9)
ERYTHROCYTE [DISTWIDTH] IN BLOOD BY AUTOMATED COUNT: 42.6 FL (ref 35.9–50)
GFR SERPL CREATININE-BSD FRML MDRD: 59 ML/MIN/1.73 M 2
GLOBULIN SER CALC-MCNC: 2.8 G/DL (ref 1.9–3.5)
GLUCOSE SERPL-MCNC: 101 MG/DL (ref 65–99)
HCT VFR BLD AUTO: 44.3 % (ref 42–52)
HGB BLD-MCNC: 15.2 G/DL (ref 14–18)
IMM GRANULOCYTES # BLD AUTO: 0.02 K/UL (ref 0–0.11)
IMM GRANULOCYTES NFR BLD AUTO: 0.3 % (ref 0–0.9)
INR PPP: 0.98 (ref 0.87–1.13)
LIPASE SERPL-CCNC: 23 U/L (ref 7–58)
LYMPHOCYTES # BLD AUTO: 3 K/UL (ref 1–4.8)
LYMPHOCYTES NFR BLD: 50.1 % (ref 22–41)
MAGNESIUM SERPL-MCNC: 2.1 MG/DL (ref 1.5–2.5)
MCH RBC QN AUTO: 31.7 PG (ref 27–33)
MCHC RBC AUTO-ENTMCNC: 34.3 G/DL (ref 33.7–35.3)
MCV RBC AUTO: 92.5 FL (ref 81.4–97.8)
MONOCYTES # BLD AUTO: 0.53 K/UL (ref 0–0.85)
MONOCYTES NFR BLD AUTO: 8.8 % (ref 0–13.4)
NEUTROPHILS # BLD AUTO: 2.19 K/UL (ref 1.82–7.42)
NEUTROPHILS NFR BLD: 36.6 % (ref 44–72)
NRBC # BLD AUTO: 0 K/UL
NRBC BLD-RTO: 0 /100 WBC
PLATELET # BLD AUTO: 204 K/UL (ref 164–446)
PMV BLD AUTO: 10.5 FL (ref 9–12.9)
POTASSIUM SERPL-SCNC: 4.4 MMOL/L (ref 3.6–5.5)
PROT SERPL-MCNC: 7.1 G/DL (ref 6–8.2)
PROTHROMBIN TIME: 12.6 SEC (ref 12–14.6)
RBC # BLD AUTO: 4.79 M/UL (ref 4.7–6.1)
SODIUM SERPL-SCNC: 138 MMOL/L (ref 135–145)
TROPONIN I SERPL-MCNC: <0.02 NG/ML (ref 0–0.04)
TROPONIN I SERPL-MCNC: <0.02 NG/ML (ref 0–0.04)
TSH SERPL DL<=0.005 MIU/L-ACNC: 1.93 UIU/ML (ref 0.38–5.33)
WBC # BLD AUTO: 6 K/UL (ref 4.8–10.8)

## 2018-01-04 PROCEDURE — 85025 COMPLETE CBC W/AUTO DIFF WBC: CPT

## 2018-01-04 PROCEDURE — 84443 ASSAY THYROID STIM HORMONE: CPT

## 2018-01-04 PROCEDURE — 71045 X-RAY EXAM CHEST 1 VIEW: CPT | Performed by: EMERGENCY MEDICINE

## 2018-01-04 PROCEDURE — A9270 NON-COVERED ITEM OR SERVICE: HCPCS | Performed by: HOSPITALIST

## 2018-01-04 PROCEDURE — 93005 ELECTROCARDIOGRAM TRACING: CPT

## 2018-01-04 PROCEDURE — 99285 EMERGENCY DEPT VISIT HI MDM: CPT

## 2018-01-04 PROCEDURE — 99219 PR INITIAL OBSERVATION CARE,LEVL II: CPT | Performed by: HOSPITALIST

## 2018-01-04 PROCEDURE — 93005 ELECTROCARDIOGRAM TRACING: CPT | Performed by: EMERGENCY MEDICINE

## 2018-01-04 PROCEDURE — 83735 ASSAY OF MAGNESIUM: CPT

## 2018-01-04 PROCEDURE — 71045 X-RAY EXAM CHEST 1 VIEW: CPT

## 2018-01-04 PROCEDURE — G0378 HOSPITAL OBSERVATION PER HR: HCPCS

## 2018-01-04 PROCEDURE — 80053 COMPREHEN METABOLIC PANEL: CPT

## 2018-01-04 PROCEDURE — 83690 ASSAY OF LIPASE: CPT

## 2018-01-04 PROCEDURE — 70450 CT HEAD/BRAIN W/O DYE: CPT

## 2018-01-04 PROCEDURE — 700102 HCHG RX REV CODE 250 W/ 637 OVERRIDE(OP): Performed by: HOSPITALIST

## 2018-01-04 PROCEDURE — 85610 PROTHROMBIN TIME: CPT

## 2018-01-04 PROCEDURE — 85730 THROMBOPLASTIN TIME PARTIAL: CPT

## 2018-01-04 PROCEDURE — 83880 ASSAY OF NATRIURETIC PEPTIDE: CPT

## 2018-01-04 PROCEDURE — 84484 ASSAY OF TROPONIN QUANT: CPT | Mod: 91

## 2018-01-04 PROCEDURE — 94760 N-INVAS EAR/PLS OXIMETRY 1: CPT

## 2018-01-04 RX ORDER — AMOXICILLIN 250 MG
2 CAPSULE ORAL 2 TIMES DAILY
Status: DISCONTINUED | OUTPATIENT
Start: 2018-01-04 | End: 2018-01-05 | Stop reason: HOSPADM

## 2018-01-04 RX ORDER — METOPROLOL SUCCINATE 25 MG/1
12.5 TABLET, EXTENDED RELEASE ORAL
Status: DISCONTINUED | OUTPATIENT
Start: 2018-01-04 | End: 2018-01-05 | Stop reason: HOSPADM

## 2018-01-04 RX ORDER — POLYETHYLENE GLYCOL 3350 17 G/17G
1 POWDER, FOR SOLUTION ORAL
Status: DISCONTINUED | OUTPATIENT
Start: 2018-01-04 | End: 2018-01-05 | Stop reason: HOSPADM

## 2018-01-04 RX ORDER — METOPROLOL SUCCINATE 25 MG/1
25 TABLET, EXTENDED RELEASE ORAL
Status: DISCONTINUED | OUTPATIENT
Start: 2018-01-04 | End: 2018-01-04

## 2018-01-04 RX ORDER — BISACODYL 10 MG
10 SUPPOSITORY, RECTAL RECTAL
Status: DISCONTINUED | OUTPATIENT
Start: 2018-01-04 | End: 2018-01-05 | Stop reason: HOSPADM

## 2018-01-04 RX ORDER — ASPIRIN 325 MG
325 TABLET ORAL DAILY
Status: DISCONTINUED | OUTPATIENT
Start: 2018-01-04 | End: 2018-01-05 | Stop reason: HOSPADM

## 2018-01-04 RX ORDER — ATORVASTATIN CALCIUM 40 MG/1
20 TABLET, FILM COATED ORAL NIGHTLY
Status: DISCONTINUED | OUTPATIENT
Start: 2018-01-04 | End: 2018-01-05 | Stop reason: HOSPADM

## 2018-01-04 RX ORDER — FENOFIBRATE 145 MG/1
145 TABLET, COATED ORAL DAILY
Status: DISCONTINUED | OUTPATIENT
Start: 2018-01-04 | End: 2018-01-04

## 2018-01-04 RX ORDER — FENOFIBRATE 134 MG/1
134 CAPSULE ORAL DAILY
Status: DISCONTINUED | OUTPATIENT
Start: 2018-01-05 | End: 2018-01-05 | Stop reason: HOSPADM

## 2018-01-04 RX ORDER — FAMOTIDINE 20 MG/1
20 TABLET, FILM COATED ORAL 2 TIMES DAILY
Status: DISCONTINUED | OUTPATIENT
Start: 2018-01-04 | End: 2018-01-05 | Stop reason: HOSPADM

## 2018-01-04 RX ORDER — OMEPRAZOLE 20 MG/1
20 CAPSULE, DELAYED RELEASE ORAL
Status: DISCONTINUED | OUTPATIENT
Start: 2018-01-04 | End: 2018-01-05 | Stop reason: HOSPADM

## 2018-01-04 RX ORDER — RANITIDINE 150 MG/1
150 TABLET ORAL 2 TIMES DAILY
Status: DISCONTINUED | OUTPATIENT
Start: 2018-01-04 | End: 2018-01-04

## 2018-01-04 RX ORDER — ASPIRIN 600 MG/1
300 SUPPOSITORY RECTAL DAILY
Status: DISCONTINUED | OUTPATIENT
Start: 2018-01-04 | End: 2018-01-05 | Stop reason: HOSPADM

## 2018-01-04 RX ORDER — PROPAFENONE HYDROCHLORIDE 150 MG/1
225 TABLET, COATED ORAL 3 TIMES DAILY
Status: DISCONTINUED | OUTPATIENT
Start: 2018-01-04 | End: 2018-01-05 | Stop reason: HOSPADM

## 2018-01-04 RX ORDER — ASPIRIN 81 MG/1
324 TABLET, CHEWABLE ORAL DAILY
Status: DISCONTINUED | OUTPATIENT
Start: 2018-01-04 | End: 2018-01-05 | Stop reason: HOSPADM

## 2018-01-04 RX ORDER — LORATADINE 10 MG/1
10 TABLET ORAL DAILY
Status: DISCONTINUED | OUTPATIENT
Start: 2018-01-05 | End: 2018-01-05 | Stop reason: HOSPADM

## 2018-01-04 RX ORDER — TAMSULOSIN HYDROCHLORIDE 0.4 MG/1
0.4 CAPSULE ORAL
Status: DISCONTINUED | OUTPATIENT
Start: 2018-01-04 | End: 2018-01-05 | Stop reason: HOSPADM

## 2018-01-04 RX ADMIN — METOPROLOL SUCCINATE 12.5 MG: 25 TABLET, EXTENDED RELEASE ORAL at 22:09

## 2018-01-04 RX ADMIN — OMEPRAZOLE 20 MG: 20 CAPSULE, DELAYED RELEASE ORAL at 22:11

## 2018-01-04 RX ADMIN — FAMOTIDINE 20 MG: 20 TABLET, FILM COATED ORAL at 22:10

## 2018-01-04 RX ADMIN — ATORVASTATIN CALCIUM 20 MG: 40 TABLET, FILM COATED ORAL at 22:10

## 2018-01-04 RX ADMIN — TAMSULOSIN HYDROCHLORIDE 0.4 MG: 0.4 CAPSULE ORAL at 22:10

## 2018-01-04 ASSESSMENT — LIFESTYLE VARIABLES
ALCOHOL_USE: NO
EVER_SMOKED: YES

## 2018-01-04 ASSESSMENT — ENCOUNTER SYMPTOMS
SORE THROAT: 0
DOUBLE VISION: 0
PHOTOPHOBIA: 0
MEMORY LOSS: 0
WEAKNESS: 1
STRIDOR: 0
BACK PAIN: 0
HEADACHES: 0
COUGH: 0
BLURRED VISION: 0
CLAUDICATION: 0
PALPITATIONS: 0
SPEECH CHANGE: 0
NECK PAIN: 0
DIZZINESS: 0
HEARTBURN: 0
SHORTNESS OF BREATH: 0
BLOOD IN STOOL: 0
CONSTIPATION: 0
SENSORY CHANGE: 0
HEMOPTYSIS: 0
FEVER: 0
EYE PAIN: 0
DEPRESSION: 0
NERVOUS/ANXIOUS: 0
SPUTUM PRODUCTION: 0
VOMITING: 0
TREMORS: 0
TINGLING: 0
ORTHOPNEA: 0
NAUSEA: 0
PND: 0
MYALGIAS: 0
CHILLS: 0

## 2018-01-04 ASSESSMENT — PAIN SCALES - GENERAL
PAINLEVEL_OUTOF10: 0
PAINLEVEL_OUTOF10: 0

## 2018-01-04 ASSESSMENT — COPD QUESTIONNAIRES
HAVE YOU SMOKED AT LEAST 100 CIGARETTES IN YOUR ENTIRE LIFE: YES
DURING THE PAST 4 WEEKS HOW MUCH DID YOU FEEL SHORT OF BREATH: NONE/LITTLE OF THE TIME
DO YOU EVER COUGH UP ANY MUCUS OR PHLEGM?: NO/ONLY WITH OCCASIONAL COLDS OR INFECTIONS
COPD SCREENING SCORE: 4

## 2018-01-04 ASSESSMENT — PATIENT HEALTH QUESTIONNAIRE - PHQ9
SUM OF ALL RESPONSES TO PHQ QUESTIONS 1-9: 0
1. LITTLE INTEREST OR PLEASURE IN DOING THINGS: NOT AT ALL
SUM OF ALL RESPONSES TO PHQ9 QUESTIONS 1 AND 2: 0
2. FEELING DOWN, DEPRESSED, IRRITABLE, OR HOPELESS: NOT AT ALL

## 2018-01-04 NOTE — ED NOTES
"Chief Complaint   Patient presents with   • Dizziness   • Blood Pressure Problem     \"up and down today\"     /76   Pulse (!) 57   Temp 37 °C (98.6 °F)   Resp 16   Ht 1.575 m (5' 2\")   Wt 94.3 kg (208 lb)   SpO2 97%   BMI 38.04 kg/m²     "

## 2018-01-04 NOTE — ED NOTES
Pt currently denies c/o CP, abd pain or back pain.  Spouse states recent medication changes for AFIB.  Dr. Ryan, Cardiologist

## 2018-01-04 NOTE — TELEPHONE ENCOUNTER
S/W Pt's daughter and she says that her father asked her to take him to see someone as he feels that something is not right. She was encouraged to take Pt to ER, she is agreeable with plan.  Claudia BLANCAS RN     ----- Message from Vianey Guzmán sent at 1/4/2018  1:23 PM PST -----  Regarding: dizziness, erratic b/p  Contact: [phone]  DAWIT/claudia    Pt's daughter Josephine calling to report pt is complaining of dizziness, not feeling well at all at 12:15pm today, had blurry vision,  and b/p 149/80.  Then 25 min later it was 101/73 pulse 59.   (No other symptoms)  Please call Josephine at [phone]

## 2018-01-05 ENCOUNTER — APPOINTMENT (OUTPATIENT)
Dept: RADIOLOGY | Facility: MEDICAL CENTER | Age: 63
End: 2018-01-05
Attending: HOSPITALIST
Payer: COMMERCIAL

## 2018-01-05 VITALS
SYSTOLIC BLOOD PRESSURE: 113 MMHG | WEIGHT: 217.15 LBS | DIASTOLIC BLOOD PRESSURE: 71 MMHG | OXYGEN SATURATION: 96 % | HEART RATE: 51 BPM | HEIGHT: 62 IN | RESPIRATION RATE: 18 BRPM | TEMPERATURE: 98.5 F | BODY MASS INDEX: 39.96 KG/M2

## 2018-01-05 PROBLEM — R42 DIZZINESS: Status: RESOLVED | Noted: 2018-01-04 | Resolved: 2018-01-05

## 2018-01-05 PROBLEM — R07.9 CHEST PAIN: Status: RESOLVED | Noted: 2018-01-04 | Resolved: 2018-01-05

## 2018-01-05 LAB
ALBUMIN SERPL BCP-MCNC: 3.7 G/DL (ref 3.2–4.9)
ALBUMIN/GLOB SERPL: 1.5 G/DL
ALP SERPL-CCNC: 36 U/L (ref 30–99)
ALT SERPL-CCNC: 23 U/L (ref 2–50)
ANION GAP SERPL CALC-SCNC: 6 MMOL/L (ref 0–11.9)
AST SERPL-CCNC: 20 U/L (ref 12–45)
BASOPHILS # BLD AUTO: 0.4 % (ref 0–1.8)
BASOPHILS # BLD: 0.02 K/UL (ref 0–0.12)
BILIRUB SERPL-MCNC: 0.7 MG/DL (ref 0.1–1.5)
BUN SERPL-MCNC: 19 MG/DL (ref 8–22)
CALCIUM SERPL-MCNC: 8.8 MG/DL (ref 8.4–10.2)
CHLORIDE SERPL-SCNC: 106 MMOL/L (ref 96–112)
CO2 SERPL-SCNC: 27 MMOL/L (ref 20–33)
CREAT SERPL-MCNC: 1.05 MG/DL (ref 0.5–1.4)
EOSINOPHIL # BLD AUTO: 0.19 K/UL (ref 0–0.51)
EOSINOPHIL NFR BLD: 3.9 % (ref 0–6.9)
ERYTHROCYTE [DISTWIDTH] IN BLOOD BY AUTOMATED COUNT: 43.4 FL (ref 35.9–50)
GFR SERPL CREATININE-BSD FRML MDRD: >60 ML/MIN/1.73 M 2
GLOBULIN SER CALC-MCNC: 2.5 G/DL (ref 1.9–3.5)
GLUCOSE SERPL-MCNC: 113 MG/DL (ref 65–99)
HCT VFR BLD AUTO: 43.1 % (ref 42–52)
HGB BLD-MCNC: 14.5 G/DL (ref 14–18)
IMM GRANULOCYTES # BLD AUTO: 0.02 K/UL (ref 0–0.11)
IMM GRANULOCYTES NFR BLD AUTO: 0.4 % (ref 0–0.9)
LV EJECT FRACT  99904: 65
LV EJECT FRACT MOD 2C 99903: 68.91
LV EJECT FRACT MOD 4C 99902: 65.2
LV EJECT FRACT MOD BP 99901: 65.52
LYMPHOCYTES # BLD AUTO: 2.65 K/UL (ref 1–4.8)
LYMPHOCYTES NFR BLD: 53.9 % (ref 22–41)
MCH RBC QN AUTO: 31.7 PG (ref 27–33)
MCHC RBC AUTO-ENTMCNC: 33.6 G/DL (ref 33.7–35.3)
MCV RBC AUTO: 94.1 FL (ref 81.4–97.8)
MONOCYTES # BLD AUTO: 0.41 K/UL (ref 0–0.85)
MONOCYTES NFR BLD AUTO: 8.3 % (ref 0–13.4)
NEUTROPHILS # BLD AUTO: 1.63 K/UL (ref 1.82–7.42)
NEUTROPHILS NFR BLD: 33.1 % (ref 44–72)
NRBC # BLD AUTO: 0 K/UL
NRBC BLD-RTO: 0 /100 WBC
PLATELET # BLD AUTO: 190 K/UL (ref 164–446)
PMV BLD AUTO: 10.6 FL (ref 9–12.9)
POTASSIUM SERPL-SCNC: 3.3 MMOL/L (ref 3.6–5.5)
PROT SERPL-MCNC: 6.2 G/DL (ref 6–8.2)
RBC # BLD AUTO: 4.58 M/UL (ref 4.7–6.1)
SODIUM SERPL-SCNC: 139 MMOL/L (ref 135–145)
WBC # BLD AUTO: 4.9 K/UL (ref 4.8–10.8)

## 2018-01-05 PROCEDURE — A9270 NON-COVERED ITEM OR SERVICE: HCPCS | Performed by: HOSPITALIST

## 2018-01-05 PROCEDURE — 94760 N-INVAS EAR/PLS OXIMETRY 1: CPT

## 2018-01-05 PROCEDURE — 700102 HCHG RX REV CODE 250 W/ 637 OVERRIDE(OP): Performed by: HOSPITALIST

## 2018-01-05 PROCEDURE — A9502 TC99M TETROFOSMIN: HCPCS

## 2018-01-05 PROCEDURE — 85025 COMPLETE CBC W/AUTO DIFF WBC: CPT

## 2018-01-05 PROCEDURE — G0378 HOSPITAL OBSERVATION PER HR: HCPCS

## 2018-01-05 PROCEDURE — 99217 PR OBSERVATION CARE DISCHARGE: CPT | Performed by: HOSPITALIST

## 2018-01-05 PROCEDURE — 700111 HCHG RX REV CODE 636 W/ 250 OVERRIDE (IP)

## 2018-01-05 PROCEDURE — 93306 TTE W/DOPPLER COMPLETE: CPT | Mod: 26 | Performed by: INTERNAL MEDICINE

## 2018-01-05 PROCEDURE — 94660 CPAP INITIATION&MGMT: CPT

## 2018-01-05 PROCEDURE — 700111 HCHG RX REV CODE 636 W/ 250 OVERRIDE (IP): Performed by: HOSPITALIST

## 2018-01-05 PROCEDURE — 93306 TTE W/DOPPLER COMPLETE: CPT

## 2018-01-05 PROCEDURE — 80053 COMPREHEN METABOLIC PANEL: CPT

## 2018-01-05 RX ORDER — REGADENOSON 0.08 MG/ML
INJECTION, SOLUTION INTRAVENOUS
Status: COMPLETED
Start: 2018-01-05 | End: 2018-01-05

## 2018-01-05 RX ORDER — METOPROLOL SUCCINATE 25 MG/1
12.5 TABLET, EXTENDED RELEASE ORAL
Qty: 30 TAB | Refills: 1 | Status: SHIPPED | OUTPATIENT
Start: 2018-01-05

## 2018-01-05 RX ORDER — POTASSIUM CHLORIDE 20 MEQ/1
40 TABLET, EXTENDED RELEASE ORAL ONCE
Status: COMPLETED | OUTPATIENT
Start: 2018-01-05 | End: 2018-01-05

## 2018-01-05 RX ADMIN — FENOFIBRATE 134 MG: 134 CAPSULE ORAL at 12:57

## 2018-01-05 RX ADMIN — ENOXAPARIN SODIUM 40 MG: 100 INJECTION SUBCUTANEOUS at 12:59

## 2018-01-05 RX ADMIN — LORATADINE 10 MG: 10 TABLET ORAL at 12:57

## 2018-01-05 RX ADMIN — PROPAFENONE HYDROCHLORIDE 225 MG: 150 TABLET, FILM COATED ORAL at 15:00

## 2018-01-05 RX ADMIN — POTASSIUM CHLORIDE 40 MEQ: 1500 TABLET, EXTENDED RELEASE ORAL at 12:58

## 2018-01-05 RX ADMIN — ASPIRIN 81 MG 324 MG: 81 TABLET ORAL at 12:55

## 2018-01-05 RX ADMIN — REGADENOSON 0.4 MG: 0.08 INJECTION, SOLUTION INTRAVENOUS at 12:01

## 2018-01-05 RX ADMIN — FAMOTIDINE 20 MG: 20 TABLET, FILM COATED ORAL at 12:57

## 2018-01-05 ASSESSMENT — COPD QUESTIONNAIRES
HAVE YOU SMOKED AT LEAST 100 CIGARETTES IN YOUR ENTIRE LIFE: YES
HAVE YOU SMOKED AT LEAST 100 CIGARETTES IN YOUR ENTIRE LIFE: YES
DURING THE PAST 4 WEEKS HOW MUCH DID YOU FEEL SHORT OF BREATH: NONE/LITTLE OF THE TIME
COPD SCREENING SCORE: 4
DO YOU EVER COUGH UP ANY MUCUS OR PHLEGM?: NO/ONLY WITH OCCASIONAL COLDS OR INFECTIONS
COPD SCREENING SCORE: 4
DURING THE PAST 4 WEEKS HOW MUCH DID YOU FEEL SHORT OF BREATH: NONE/LITTLE OF THE TIME
DO YOU EVER COUGH UP ANY MUCUS OR PHLEGM?: NO/ONLY WITH OCCASIONAL COLDS OR INFECTIONS

## 2018-01-05 ASSESSMENT — LIFESTYLE VARIABLES: EVER_SMOKED: YES

## 2018-01-05 ASSESSMENT — PAIN SCALES - GENERAL: PAINLEVEL_OUTOF10: 0

## 2018-01-05 NOTE — CARE PLAN
Problem: Safety  Goal: Will remain free from falls    Intervention: Implement fall precautions  Patient instructed to call for assistance when needed.      Problem: Knowledge Deficit  Goal: Knowledge of the prescribed therapeutic regimen will improve    Intervention: Discuss information regarding therpeutic regimen and document in education  Discussed plan of care.

## 2018-01-05 NOTE — CARE PLAN
Problem: Nutritional:  Goal: Achieve adequate nutritional intake  Patient will consume >50% of meals  Outcome: NOT MET  NPO x1 day

## 2018-01-05 NOTE — PROGRESS NOTES
Printed discharge instructions in preparation for this afternoon's discharge. Will remove saline lock prior to patients leaving hospital.

## 2018-01-05 NOTE — ED PROVIDER NOTES
"ED Provider Note    CHIEF COMPLAINT  Chief Complaint   Patient presents with   • Dizziness     since yesterday   • Blood Pressure Problem     \"up and down today\"       HPI  Vernon Saldaña is a 62 y.o. male with a history of atrial fibrillation, on chronic anticoagulation, asthma, arthritis, sleep apnea who presents with complaints of lightheadedness and dizziness along with some intermittent chest pain for the past 2 days. Patient says he has been feeling dizzy for the last 6 weeks, and saw his cardiologist Dr. Ryan a week ago and was told he was feeling this way because his medications were no longer working. The patient says that over the past 2 days he has had left sided chest pain with radiation into his left shoulder and arm. He has been feeling nauseous, lightheaded, and short of breath. He says his last episode of chest pain was about half hour ago. He denies any current chest pain. He denies any fever, sore throat, cough, vomiting, or diarrhea.  Today he called his daughter and said he was feeling dizzy and felt unstable and not able to take a shower by himself. He says his blood pressure has been going up and down all day.  She came home checked his blood pressure and it was in the 140s over 90s, with a pulse in the 90s.    REVIEW OF SYSTEMS  See HPI for further details. All other systems are negative.     PAST MEDICAL HISTORY  Past Medical History:   Diagnosis Date   • Arrhythmia 2013    A Flutter   • Arthritis     all joints (osteoarthritis)   • ASTHMA     inhalers   • Atrial fibrillation (CMS-[facility])    • Cholesterol blood decreased    • Enlarged prostate    • Heart burn    • High cholesterol    • HTN (hypertension) 8/19/2013   • Hypercholesteremia    • PAF (paroxysmal atrial fibrillation) (CMS-[facility]) 8/19/2013   • Pain 2/10/14    10/10 right shoulder   • Pneumonia 90's   • Preop cardiovascular exam 8/19/2013   • Sleep apnea     cpap    • Snoring        FAMILY HISTORY  Family History   Problem " Relation Age of Onset   • Heart Disease Father    • Diabetes Father    • Heart Attack Mother 77   • Heart Disease Sister      surgery at age 10   • Stroke         SOCIAL HISTORY  Social History     Social History   • Marital status:      Spouse name: N/A   • Number of children: N/A   • Years of education: N/A     Social History Main Topics   • Smoking status: Former Smoker     Packs/day: 1.00     Years: 13.00     Types: Cigarettes     Start date: 1/1/1967     Quit date: 1/1/1980   • Smokeless tobacco: Never Used   • Alcohol use No   • Drug use: No   • Sexual activity: Yes     Partners: Female     Other Topics Concern   • Not on file     Social History Narrative   • No narrative on file       SURGICAL HISTORY  Past Surgical History:   Procedure Laterality Date   • PTERYGIUM EXCISION Right 3/8/2016    Procedure: PTERYGIUM EXCISION;  Surgeon: Homer Parsons M.D.;  Location: [facility];  Service:    • PTERYGIUM EXCISION Left 11/11/2015    Procedure: PTERYGIUM EXCISION W/AMNIOGRAFT AND MITOMYCIN;  Surgeon: Homer Parsons M.D.;  Location: [facility];  Service:    • KNEE MANIPULATION Right 6/29/2015    Procedure: KNEE MANIPULATION;  Surgeon: Dave Knox M.D.;  Location: SURGERY SAME DAY [facility];  Service:    • KNEE ARTHROPLASTY TOTAL Right 5/11/2015    Procedure: KNEE ARTHROPLASTY TOTAL;  Surgeon: Dave Knox M.D.;  Location: [facility];  Service:    • FULL THICKNESS BLOCK RESECTION  4/30/2014    Performed by Isacc Ramírez M.D. at [facility]   • BIOPSY GENERAL  4/30/2014    left eye lid cyst removal   • SHOULDER ARTHROSCOPY W/ ROTATOR CUFF REPAIR  2/17/2014    Performed by Dave Knox M.D. at SURGERY [facility]   • KNEE UNICOMPARTMENTAL  10/21/2013    Performed by Dave Knox M.D. at SURGERY [facility]   • KNEE ARTHROSCOPY  8/14/08    Performed by ANDERSON PLASCENCIA at SURGERY SAME DAY [facility]   •  "MEDIAL MENISCECTOMY  8/14/08    Performed by ANDERSON PLASCENCIA at SURGERY SAME DAY [facility] ORS   • OTHER ORTHOPEDIC SURGERY  1987    right knee       CURRENT MEDICATIONS  Home Medications     Reviewed by Kimberlee Talbert R.N. (Registered Nurse) on 01/04/18 at 1744  Med List Status: Complete   Medication Last Dose Status   aspirin EC (ECOTRIN) 81 MG Tablet Delayed Response 1/4/2018 Active   atorvastatin (LIPITOR) 20 MG TABS 1/3/2018 Active   fenofibrate (TRICOR) 145 MG Tab 1/4/2018 Active   loratadine (CLARITIN) 10 MG Tab 1/4/2018 Active   metoprolol SR (TOPROL XL) 25 MG TABLET SR 24 HR 1/4/2018 Active   omeprazole (PRILOSEC) 20 MG delayed-release capsule 1/4/2018 Active   propafenone (RYTHMOL) 225 MG tablet 1/4/2018 Active   ranitidine (ZANTAC) 150 MG Tab 1/4/2018 Active   tamsulosin (FLOMAX) 0.4 MG capsule 1/3/2018 Active                ALLERGIES  No Known Allergies    PHYSICAL EXAM  VITAL SIGNS: /76   Pulse (!) 57   Temp 37 °C (98.6 °F)   Resp 16   Ht 1.575 m (5' 2\")   Wt 94.3 kg (208 lb)   SpO2 97%   BMI 38.04 kg/m²   Constitutional: Awake, alert, in no acute distress, Non-toxic appearance.   HENT: Atraumatic. Bilateral external ears normal, mucous membranes moist, throat nonerythematous without exudates, nose is normal.  Eyes: PERRL, EOMI, conjunctiva moist, noninjected.  Neck: Nontender, Normal range of motion, No nuchal rigidity, No stridor.   Lymphatic: No lymphadenopathy noted.   Cardiovascular: Regular rate and rhythm, no murmurs, rubs, gallops.  Thorax & Lungs:  Good breath sounds bilaterally, no wheezes, rales, or retractions.  No chest tenderness.  Abdomen: Bowel sounds normal, Soft, nontender, nondistended, no rebound, guarding, masses.  Back: No CVA or spinal tenderness.  Extremities: Intact distal pulses, No edema, No tenderness.   Skin: Warm, Dry, No rashes.   Musculoskeletal: No joint swelling or tenderness.  Neurologic: Alert & oriented x 3, cranial nerves II through XII intact, no facial " asymmetry, speech is fluent, sensory intact to light touch, and motor function normal shows 5/5 strength in upper and lower extremity, no pronator drift, alternating finger movements intact. No focal deficits.   Psychiatric: Patient appears somewhat anxious, judgment normal, mood normal.    EKG  Twelve-lead EKG shows a sinus bradycardia, rate of 56, first-degree AV block with TX interval of 208 ms, no acute ST-T wave elevations or ST depressions, no pathologic Q waves, no evidence of acute injury or ischemic pattern on my reading, in comparison to previous EKG from December, 2017, the patient was previously in a controlled atrial fibrillation, is now in a sinus bradycardia.        RADIOLOGY/PROCEDURES  DX-CHEST-PORTABLE (1 VIEW)   Final Result      No evidence of acute cardiopulmonary process.      CT-HEAD W/O   Final Result      1.  Low attenuation involving the left frontal lobe adjacent to the sylvian fissure and extending into the periventricular white matter and the cortical surface likely representing an area of subacute ischemic change.      2.  Atrophy.      DX-CHEST-LIMITED (1 VIEW)   Final Result      No evidence of acute cardiopulmonary process.      NM-CARDIAC STRESS TEST    (Results Pending)   ECHOCARDIOGRAM COMP W/O CONT    (Results Pending)         COURSE & MEDICAL DECISION MAKING  Pertinent Labs & Imaging studies reviewed. (See chart for details)  The patient presents with the above complaints. He denies any current chest pain. EKG shows a sinus bradycardia. Blood pressure appears normal.  Chest x-ray shows no acute cardiopulmonary abnormalities. CT scan head without contrast shows a low attenuation area in the left frontal lobe which could rubs an area of subacute ischemia per radiology.  CBC shows a white count of 6000, hemoglobin 15.2, right shift, chemistry glucose 101, otherwise normal, troponin less than 0.02, BNP 66, INR 0.98. Given his complaints of chest pain, patient will be admitted. Case  was discussed with Dr. Borden.    FINAL IMPRESSION  1. Chest Pain  2. Near syncope  3.         Electronically signed by: Fernie Villar, 1/4/2018 6:06 PM

## 2018-01-05 NOTE — DIETARY
NUTRITION SERVICES - poor PO PTA - Pt is a 61 yo male who comes in with an admitting Dx of Chest pain, PAF, SAUL, HTN, and Dizziness. H/o hypercholesterolemia, heart burn, and Afib. BMI = 39.72.    Pertinent Labs: Reviewed.    Pertinent Meds: Lipitor, Lovenox, Pepcid, Prilosec, (PRN: Miralax, MOM, Dulcolax)    Fluids: None noted in MAR    GI: last BM on 1/4    Skin: no skin breakdown noted    Diet order: NPO    Weight: 217# (98.5kg)    The pt was seen today to interview for poor PO PTA per the nutrition admit screening. The pt was down for a stress test but his daughter and wife were at bedside and able to provide the following information. The pt has not been eating at his baseline x2 months d/t feeling very fatigued and having little appetite. His PO intake has been worse over the previous 1 week PTA. He is normally a good eater per his family. Wt loss during this time is unknown. Discussed pt's hx of high cholesterol - labs look WNL except HDL is noted to be low. The pt's family are aware of this and have been working hard on diet at home. They make fruit/veggie smoothies, do not cook using lard, do not add salt when cooking, and choose leaner meats or cut off excess fat before cooking. Discussed increasing omega 3 fatty acids in the diet to help with HDL levels. Pt's family was encouraged to continue healthy dietary strategies at home. The pt will be followed in the hospital to ensure he is meeting his nutritional needs.     Plan/Rec:   1) Continue to monitor and encourage good PO intake  2) Nutrition rep to see pt daily for meal preferences  3) Fluids per MD discretion    RD monitoring.

## 2018-01-05 NOTE — PROGRESS NOTES
Received report from nightshift RN (Patricia). Discussed plan of care, assumed care of patient. Safety measures in place.

## 2018-01-05 NOTE — H&P
"Hospital Medicine History and Physical    Date of Service  1/4/2018    Chief Complaint  Chief Complaint   Patient presents with   • Dizziness     since yesterday   • Blood Pressure Problem     \"up and down today\"       History of Presenting Illness  62 y.o. male with a past medical history of Paroxysmal atrial fibrillation, hypertension, hyperlipidemia presented 1/4/2018 for evaluation of chest discomfort that started today. Patient describes discomfort as pressure-like sensation for the tendon severity radiating to his left arm , was dizzy when this occurred exacerbating or relieving factors currently says he has no current chest discomfort. Patient has been apparently dealing with the vertigo over the past 1 month has seen cardiology on 12/27 as a result and was asked to continue his home dose of Rythmol as well as metoprolol. Dr. Ryan has referred him to an electrophysiologist for which she has an appointment with. On evaluation patient's initial EKG and troponins are negative at this time patient will be admitted for her stratification with a stress test.      Primary Care Physician  Marva Arteaga D.O.    Consultants  None    Code Status  Code: Full code    Review of Systems  Review of Systems   Constitutional: Positive for malaise/fatigue. Negative for chills and fever.   HENT: Negative for congestion, hearing loss, sore throat and tinnitus.    Eyes: Negative for blurred vision, double vision, photophobia and pain.   Respiratory: Negative for cough, hemoptysis, sputum production, shortness of breath and stridor.    Cardiovascular: Positive for chest pain. Negative for palpitations, orthopnea, claudication and PND.   Gastrointestinal: Negative for blood in stool, constipation, heartburn, melena, nausea and vomiting.   Genitourinary: Negative for dysuria, frequency and urgency.   Musculoskeletal: Negative for back pain, myalgias and neck pain.   Neurological: Positive for weakness. Negative for dizziness, " tingling, tremors, sensory change, speech change and headaches.   Psychiatric/Behavioral: Negative for depression, memory loss and suicidal ideas. The patient is not nervous/anxious.           Past Medical History  Past Medical History:   Diagnosis Date   • Pain 2/10/14    10/10 right shoulder   • HTN (hypertension) 8/19/2013   • Preop cardiovascular exam 8/19/2013   • PAF (paroxysmal atrial fibrillation) (CMS-HCC) 8/19/2013   • Arrhythmia 2013    A Flutter   • Arthritis     all joints (osteoarthritis)   • ASTHMA     inhalers   • Atrial fibrillation (CMS-[facility])    • Cholesterol blood decreased    • Enlarged prostate    • Heart burn    • High cholesterol    • Hypercholesteremia    • Pneumonia 90's   • Sleep apnea     cpap    • Snoring        Surgical History  Past Surgical History:   Procedure Laterality Date   • PTERYGIUM EXCISION Right 3/8/2016    Procedure: PTERYGIUM EXCISION;  Surgeon: Homer Parsons M.D.;  Location: [facility];  Service:    • PTERYGIUM EXCISION Left 11/11/2015    Procedure: PTERYGIUM EXCISION W/AMNIOGRAFT AND MITOMYCIN;  Surgeon: Homer Parsons M.D.;  Location: [facility];  Service:    • KNEE MANIPULATION Right 6/29/2015    Procedure: KNEE MANIPULATION;  Surgeon: Dave Knox M.D.;  Location: SURGERY SAME DAY [facility];  Service:    • KNEE ARTHROPLASTY TOTAL Right 5/11/2015    Procedure: KNEE ARTHROPLASTY TOTAL;  Surgeon: Dave Knox M.D.;  Location: SURGERY [facility];  Service:    • FULL THICKNESS BLOCK RESECTION  4/30/2014    Performed by Isacc Ramírez M.D. at [facility]   • BIOPSY GENERAL  4/30/2014    left eye lid cyst removal   • SHOULDER ARTHROSCOPY W/ ROTATOR CUFF REPAIR  2/17/2014    Performed by Dave Knox M.D. at [facility]   • KNEE UNICOMPARTMENTAL  10/21/2013    Performed by Dave Knox M.D. at [facility]   • KNEE ARTHROSCOPY  8/14/08    Performed by ANDERSON PLASCENCIA  at SURGERY SAME DAY [facility]   • MEDIAL MENISCECTOMY  08    Performed by ANDERSON PLASCENCIA at SURGERY SAME DAY [facility]   • OTHER ORTHOPEDIC SURGERY  1987    right knee       Medications  No current facility-administered medications on file prior to encounter.      Current Outpatient Prescriptions on File Prior to Encounter   Medication Sig Dispense Refill   • propafenone (RYTHMOL) 225 MG tablet TAKE 1 TABLET BY MOUTH EVERY 8 HOURS 270 Tab 2   • fenofibrate (TRICOR) 145 MG Tab Take 145 mg by mouth every day.     • loratadine (CLARITIN) 10 MG Tab Take 10 mg by mouth every day.     • aspirin EC (ECOTRIN) 81 MG Tablet Delayed Response Take 81 mg by mouth every day.     • metoprolol SR (TOPROL XL) 25 MG TABLET SR 24 HR Take 25 mg by mouth every bedtime. FOR BLOOD PRESSURE  5   • omeprazole (PRILOSEC) 20 MG delayed-release capsule Take 20 mg by mouth every bedtime.     • ranitidine (ZANTAC) 150 MG Tab Take 150 mg by mouth 2 times a day.     • tamsulosin (FLOMAX) 0.4 MG capsule Take 0.4 mg by mouth every bedtime.     • atorvastatin (LIPITOR) 20 MG TABS Take 20 mg by mouth every evening.         Family History  Family History   Problem Relation Age of Onset   • Heart Disease Father    • Diabetes Father    • Heart Attack Mother 77   • Heart Disease Sister      surgery at age 10   • Stroke         Social History  Social History   Substance Use Topics   • Smoking status: Former Smoker     Packs/day: 1.00     Years: 13.00     Types: Cigarettes     Start date: 1967     Quit date: 1980   • Smokeless tobacco: Never Used   • Alcohol use No       Allergies  No Known Allergies     Physical Exam  Laboratory   Hemodynamics  Temp (24hrs), Av.8 °C (98.3 °F), Min:36.6 °C (97.9 °F), Max:37 °C (98.6 °F)   Temperature: 36.6 °C (97.9 °F)  Pulse  Av.7  Min: 50  Max: 59 Heart Rate (Monitored): (!) 51  Blood Pressure: 131/85, NIBP: 126/86      Respiratory      Respiration: 16, Pulse Oximetry: 94 %              Physical Exam   Constitutional: He is oriented to person, place, and time. He appears well-developed and well-nourished. No distress.   Morbidly obese    HENT:   Head: Normocephalic and atraumatic.   Mouth/Throat: No oropharyngeal exudate.   Eyes: Conjunctivae are normal. Pupils are equal, round, and reactive to light. Right eye exhibits no discharge. No scleral icterus.   Neck: Neck supple. No JVD present. No thyromegaly present.   Cardiovascular: Intact distal pulses.    No murmur heard.  NSR, bradycadic.   Pulmonary/Chest: Effort normal and breath sounds normal. No stridor. No respiratory distress. He has no wheezes. He has no rales.   Abdominal: Soft. Bowel sounds are normal. He exhibits no distension. There is no tenderness. There is no rebound.   Musculoskeletal: Normal range of motion. He exhibits no edema.   Neurological: He is alert and oriented to person, place, and time. No cranial nerve deficit.   Skin: Skin is warm. He is not diaphoretic. No erythema.   Psychiatric: He has a normal mood and affect. His behavior is normal. Thought content normal.         Assessment/Plan  * Chest pain- (present on admission)   Assessment & Plan    Rule out cardiac ischemia.  EKG and initial troponins are neg, will continue trend cardiac troponins every 4 hours negative we'll proceed with a nuclear stress test.  We'll continue patient on statin, b-blocker, and aspirin for cardiac protective measures        HTN (hypertension)- (present on admission)   Assessment & Plan    Controlled continue metoprolol.        Dizziness- (present on admission)   Assessment & Plan    Resolved, possibly 2/2 to bradycardia? Patient saw cardiology on 12/27 recommended to continue current doses and medications.  We will continue rhythmol but will decreased metoprolol XL from 25 to 12.5mg daily.        SALU (obstructive sleep apnea)- (present on admission)   Assessment & Plan    Requested family to bring his CPAP machine        PAF (paroxysmal  atrial fibrillation) (CMS-HCC)- (present on admission)   Assessment & Plan    Currently sinus rhythm bradycardic  Continue aspirin daily.             I anticipate this patient is appropriate for observation status at this time.    Prophylaxis: lovenox    Recent Labs      01/04/18   1615   WBC  6.0   RBC  4.79   HEMOGLOBIN  15.2   HEMATOCRIT  44.3   MCV  92.5   MCH  31.7   MCHC  34.3   RDW  42.6   PLATELETCT  204   MPV  10.5     Recent Labs      01/04/18   1615   SODIUM  138   POTASSIUM  4.4   CHLORIDE  105   CO2  26   GLUCOSE  101*   BUN  20   CREATININE  1.23   CALCIUM  9.5     Recent Labs      01/04/18   1615   ALTSGPT  26   ASTSGOT  24   ALKPHOSPHAT  37   TBILIRUBIN  0.5   LIPASE  23   GLUCOSE  101*     Recent Labs      01/04/18   1615   APTT  24.6*   INR  0.98     Recent Labs      01/04/18   1615   BNPBTYPENAT  66         Lab Results   Component Value Date    TROPONINI <0.02 01/04/2018       Imaging  DX-CHEST-PORTABLE (1 VIEW)   Final Result      No evidence of acute cardiopulmonary process.      CT-HEAD W/O   Final Result      1.  Low attenuation involving the left frontal lobe adjacent to the sylvian fissure and extending into the periventricular white matter and the cortical surface likely representing an area of subacute ischemic change.      2.  Atrophy.      DX-CHEST-LIMITED (1 VIEW)   Final Result      No evidence of acute cardiopulmonary process.      NM-CARDIAC STRESS TEST    (Results Pending)   ECHOCARDIOGRAM COMP W/O CONT    (Results Pending)

## 2018-01-05 NOTE — DISCHARGE INSTRUCTIONS
Discharge Instructions    Discharged to home by car with relative. Discharged via wheelchair, hospital escort: Yes.  Special equipment needed: Not Applicable    Be sure to schedule a follow-up appointment with your primary care doctor or any specialists as instructed.     Discharge Plan:   Influenza Vaccine Indication: Not indicated: Previously immunized this influenza season and > 8 years of age    I understand that a diet low in cholesterol, fat, and sodium is recommended for good health. Unless I have been given specific instructions below for another diet, I accept this instruction as my diet prescription.   Other diet: Regular    Special Instructions: None    · Is patient discharged on Warfarin / Coumadin?   No     · Is patient Post Blood Transfusion?  No    Depression / Suicide Risk    As you are discharged from this [facility] facility, it is important to learn how to keep safe from harming yourself.    Recognize the warning signs:  · Abrupt changes in personality, positive or negative- including increase in energy   · Giving away possessions  · Change in eating patterns- significant weight changes-  positive or negative  · Change in sleeping patterns- unable to sleep or sleeping all the time   · Unwillingness or inability to communicate  · Depression  · Unusual sadness, discouragement and loneliness  · Talk of wanting to die  · Neglect of personal appearance   · Rebelliousness- reckless behavior  · Withdrawal from people/activities they love  · Confusion- inability to concentrate     If you or a loved one observes any of these behaviors or has concerns about self-harm, here's what you can do:  · Talk about it- your feelings and reasons for harming yourself  · Remove any means that you might use to hurt yourself (examples: pills, rope, extension cords, firearm)  · Get professional help from the community (Mental Health, Substance Abuse, psychological counseling)  · Do not be alone:Call your Safe Contact-  someone whom you trust who will be there for you.  · Call your local CRISIS HOTLINE 763-9591 or 973-827-8831  · Call your local Children's Mobile Crisis Response Team [place] (054) 484-1988 or www.Boats.com  · Call the toll free National Suicide Prevention Hotlines   · National Suicide Prevention Lifeline 858-692-FXZJ (0053)  · [employer] 800-SUICIDE (068-3344)    Dolor en el pecho, Inespecífico  (Chest Pain, Nonspecific)  Con frecuencia es difícil franco un diagnóstico específico de la causa del dolor de pecho. Siempre hay mazin posibilidad de que baron dolor puede estar relacionado con algo grave, paul un ataque al corazón o un coágulo de adi en los pulmones. Deberá hacer controles con baron médico para mazin evaluación adicional. Puede ser necesario realizar otros análisis de laboratorio u otros estudios tales paul radiografías, electrocardiograma, prueba de esfuerzo, o diagnóstico por imágenes para el corazón para determinar la causa de baorn dolor.   La mayor parte del dolor en el pecho inespecífico mejorará en 2 ó 3 días de reposo y analgésicos suaves. Mitch los próximos días evite los ejercicios físicos o las actividades que le causan dolor. No fume. Evite consumir alcohol. Comuníquese con baron médico para realizar controles según las indicaciones.   SOLICITE ATENCIÓN MÉDICA DE INMEDIATO SI:  · El dolor en el pecho aumenta o se irradia hacia el brazo, el alayna, la mandíbula, la espalda o el abdomen.   · Le falta el aire, aumenta la tos o comienza a escupir adi al toser.   · Siente un dolor intenso en la espalda, el abdomen, tiene náuseas o vómitos intensos.   · Desarrolla mazin debilidad extrema, se desmaya, tiene fiebre o escalofríos.   Document Released: 12/18/2006 Document Revised: 03/11/2013  [employer]® Patient Information ©2013 [employer].

## 2018-01-05 NOTE — ASSESSMENT & PLAN NOTE
Rule out cardiac ischemia.  EKG and initial troponins are neg, will continue trend cardiac troponins every 4 hours negative we'll proceed with a nuclear stress test.  We'll continue patient on statin, b-blocker, and aspirin for cardiac protective measures

## 2018-01-05 NOTE — ASSESSMENT & PLAN NOTE
Resolved, possibly 2/2 to bradycardia? Patient saw cardiology on 12/27 recommended to continue current doses and medications.  We will continue rhythmol but will decreased metoprolol XL from 25 to 12.5mg daily.

## 2018-01-05 NOTE — PROGRESS NOTES
Admited Pt from ER via [place]ney. Pt Awake. A&O x4 Denies any pain or discomfort. Family with him. Oriented Pt to room and encouraged him to call for assistance as needed.

## 2018-01-05 NOTE — CARE PLAN
Problem: Safety  Goal: Will remain free from injury  Outcome: PROGRESSING AS EXPECTED  Pt encouraged to call for assistance as needed. Bed in low position, upper side rails up, anti slippery socks on, call light within reach.       Problem: Knowledge Deficit  Goal: Knowledge of disease process/condition, treatment plan, diagnostic tests, and medications will improve  Outcome: PROGRESSING AS EXPECTED  Discussed POC. New meds and procedures. Answered Pt and family questions.

## 2018-01-05 NOTE — PROGRESS NOTES
Nursing care plan includes knowledge deficit, potential for discomfort, potential for compromised cardiac output.  POC includes teaching, comfort measures and reassurance, and access to code cart, cardiology stand by and availability of rapid response team.  Pt verbalizes good understanding of benefits and risks of pharmacological cardiac stress test.  Informed consent obtained.  Lexiscan given, pt developed the following signs/symptoms:no s/s.    VS stable, symptoms resolved.  To waiting room, fluids and/or snack given, awaiting second scan.  Nursing goals met.

## 2018-01-05 NOTE — DISCHARGE SUMMARY
"CHIEF COMPLAINT ON ADMISSION  Chief Complaint   Patient presents with   • Dizziness     since yesterday   • Blood Pressure Problem     \"up and down today\"       CODE STATUS  Full Code    HPI & HOSPITAL COURSE  This is a 62 y.o. male here with chest pain and dizziness which has been chronic. On admission patient received an EKG and cardiac serial troponin's were followed which was grossly normal. In addition we ordered an echocardiogram as well as a nuclear stress test for further risk stratification which was grossly normal. Patient's chest discomfort has resolved. Patient denies fevers/chills, chest pain, shortness of breath or nausea/vommiting.   Also of note patient was seen multiple times at cardiology clinic for dizziness, he is scheduled for a EP consult. In the meantime I decreased his metoprolol dose of XL from 25 to 12.mg daily. From overall his vitals have improved his HR in the 60s. Patient denies having any dizziness.         Therefore, he is discharged in good and stable condition with close outpatient follow-up.    SPECIFIC OUTPATIENT FOLLOW-UP  PCP in 1 week  Cardiology clinic in 1-2 weeks    DISCHARGE PROBLEM LIST  Principal Problem (Resolved):    Chest pain POA: Yes  Active Problems:    PAF (paroxysmal atrial fibrillation) (CMS-HCC) POA: Yes    SAUL (obstructive sleep apnea) POA: Yes    HTN (hypertension) POA: Yes  Resolved Problems:    Dizziness POA: Yes      FOLLOW UP  Future Appointments  Date Time Provider Department Center   1/31/2018 10:20 AM Gibson Sampson M.D. [facility] None   2/1/2018 8:40 AM ALEX Olson [facility] None     Marva Arteaga D.O.  [address]  [place] [address]  [phone]    Schedule an appointment as soon as possible for a visit in 1 week  Hospital follow-up appointment with PCP      MEDICATIONS ON DISCHARGE   Vernon Saldaña   Home Medication Instructions JERRY:[id number]    Printed on:01/05/18 1551   Medication Information                      aspirin EC " (ECOTRIN) 81 MG Tablet Delayed Response  Take 81 mg by mouth every day.             atorvastatin (LIPITOR) 20 MG TABS  Take 20 mg by mouth every evening.             fenofibrate (TRICOR) 145 MG Tab  Take 145 mg by mouth every day.             loratadine (CLARITIN) 10 MG Tab  Take 10 mg by mouth every day.             metoprolol SR (TOPROL XL) 25 MG TABLET SR 24 HR  Take 0.5 Tabs by mouth every bedtime.             omeprazole (PRILOSEC) 20 MG delayed-release capsule  Take 20 mg by mouth every bedtime.             propafenone (RYTHMOL) 225 MG tablet  TAKE 1 TABLET BY MOUTH EVERY 8 HOURS             ranitidine (ZANTAC) 150 MG Tab  Take 150 mg by mouth 2 times a day.             tamsulosin (FLOMAX) 0.4 MG capsule  Take 0.4 mg by mouth every bedtime.                 DIET  Orders Placed This Encounter   Procedures   • DIET NPO     Standing Status:   Standing     Number of Occurrences:   1     Order Specific Question:   Restrict to:     Answer:   Strict [1]       ACTIVITY  As tolerated.  Weight bearing as tolerated      CONSULTATIONS  None    PROCEDURES  None    LABORATORY  Lab Results   Component Value Date/Time    SODIUM 139 01/05/2018 04:10 AM    POTASSIUM 3.3 (L) 01/05/2018 04:10 AM    CHLORIDE 106 01/05/2018 04:10 AM    CO2 27 01/05/2018 04:10 AM    GLUCOSE 113 (H) 01/05/2018 04:10 AM    BUN 19 01/05/2018 04:10 AM    CREATININE 1.05 01/05/2018 04:10 AM        Lab Results   Component Value Date/Time    WBC 4.9 01/05/2018 04:10 AM    HEMOGLOBIN 14.5 01/05/2018 04:10 AM    HEMATOCRIT 43.1 01/05/2018 04:10 AM    PLATELETCT 190 01/05/2018 04:10 AM        Total time of the discharge process exceeds 45 minutes

## 2018-01-16 ENCOUNTER — HOSPITAL ENCOUNTER (OUTPATIENT)
Dept: LAB | Facility: MEDICAL CENTER | Age: 63
End: 2018-01-16
Attending: FAMILY MEDICINE
Payer: COMMERCIAL

## 2018-01-16 LAB
ALBUMIN SERPL BCP-MCNC: 4.1 G/DL (ref 3.2–4.9)
ALBUMIN/GLOB SERPL: 1.6 G/DL
ALP SERPL-CCNC: 37 U/L (ref 30–99)
ALT SERPL-CCNC: 29 U/L (ref 2–50)
ANION GAP SERPL CALC-SCNC: 7 MMOL/L (ref 0–11.9)
AST SERPL-CCNC: 22 U/L (ref 12–45)
BILIRUB SERPL-MCNC: 0.4 MG/DL (ref 0.1–1.5)
BUN SERPL-MCNC: 17 MG/DL (ref 8–22)
CALCIUM SERPL-MCNC: 9.3 MG/DL (ref 8.5–10.5)
CHLORIDE SERPL-SCNC: 108 MMOL/L (ref 96–112)
CO2 SERPL-SCNC: 26 MMOL/L (ref 20–33)
CREAT SERPL-MCNC: 1.17 MG/DL (ref 0.5–1.4)
GLOBULIN SER CALC-MCNC: 2.6 G/DL (ref 1.9–3.5)
GLUCOSE SERPL-MCNC: 92 MG/DL (ref 65–99)
POTASSIUM SERPL-SCNC: 4.2 MMOL/L (ref 3.6–5.5)
PROT SERPL-MCNC: 6.7 G/DL (ref 6–8.2)
SODIUM SERPL-SCNC: 141 MMOL/L (ref 135–145)

## 2018-01-16 PROCEDURE — 36415 COLL VENOUS BLD VENIPUNCTURE: CPT

## 2018-01-16 PROCEDURE — 80053 COMPREHEN METABOLIC PANEL: CPT

## 2018-01-31 ENCOUNTER — OFFICE VISIT (OUTPATIENT)
Dept: CARDIOLOGY | Facility: MEDICAL CENTER | Age: 63
End: 2018-01-31
Payer: COMMERCIAL

## 2018-01-31 ENCOUNTER — TELEPHONE (OUTPATIENT)
Dept: CARDIOLOGY | Facility: MEDICAL CENTER | Age: 63
End: 2018-01-31

## 2018-01-31 VITALS
HEIGHT: 62 IN | WEIGHT: 211 LBS | SYSTOLIC BLOOD PRESSURE: 128 MMHG | OXYGEN SATURATION: 95 % | DIASTOLIC BLOOD PRESSURE: 80 MMHG | HEART RATE: 62 BPM | BODY MASS INDEX: 38.83 KG/M2

## 2018-01-31 DIAGNOSIS — I48.91 ATRIAL FIBRILLATION, UNSPECIFIED TYPE (HCC): ICD-10-CM

## 2018-01-31 LAB — EKG IMPRESSION: NORMAL

## 2018-01-31 PROCEDURE — 99205 OFFICE O/P NEW HI 60 MIN: CPT | Mod: 25 | Performed by: INTERNAL MEDICINE

## 2018-01-31 PROCEDURE — 93000 ELECTROCARDIOGRAM COMPLETE: CPT | Performed by: INTERNAL MEDICINE

## 2018-01-31 RX ORDER — TERBINAFINE HYDROCHLORIDE 250 MG/1
250 TABLET ORAL DAILY
COMMUNITY
End: 2018-12-14

## 2018-01-31 NOTE — PROGRESS NOTES
Subjective:   Vernon Saldaña is a 62 y.o. male who presents today for follow up of atrial arrhythmia    Chief Complaint: atrial fibrillation    Previously seen by me in 2013 and did well with meds afterwards.   Recently arrhythmia acting up.    cpap started not working well but got new one last Thursday.     Dizziness biggest issue.  He was in the hospital 1/4.  A lot of tests.  Ecg, echo and stress all done at that time    Diagnosed with a fib in august 2013.  Problem getting it under control until he started rhythmol.  Then better but now back      Wants to get ablation    Past Medical History:   Diagnosis Date   • Arrhythmia 2013    A Flutter   • Arthritis     all joints (osteoarthritis)   • ASTHMA     inhalers   • Atrial fibrillation (CMS-[facility])    • Cholesterol blood decreased    • Enlarged prostate    • Heart burn    • High cholesterol    • HTN (hypertension) 8/19/2013   • Hypercholesteremia    • PAF (paroxysmal atrial fibrillation) (CMS-HCC) 8/19/2013   • Pain 2/10/14    10/10 right shoulder   • Pneumonia 90's   • Preop cardiovascular exam 8/19/2013   • Sleep apnea     cpap    • Snoring      Past Surgical History:   Procedure Laterality Date   • PTERYGIUM EXCISION Right 3/8/2016    Procedure: PTERYGIUM EXCISION;  Surgeon: Homer Parsons M.D.;  Location: [facility];  Service:    • PTERYGIUM EXCISION Left 11/11/2015    Procedure: PTERYGIUM EXCISION W/AMNIOGRAFT AND MITOMYCIN;  Surgeon: Homer Parsons M.D.;  Location: [facility];  Service:    • KNEE MANIPULATION Right 6/29/2015    Procedure: KNEE MANIPULATION;  Surgeon: Dave Knox M.D.;  Location: SURGERY SAME DAY [facility] ORS;  Service:    • KNEE ARTHROPLASTY TOTAL Right 5/11/2015    Procedure: KNEE ARTHROPLASTY TOTAL;  Surgeon: Dave Knox M.D.;  Location: SURGERY [facility] ORS;  Service:    • FULL THICKNESS BLOCK RESECTION  4/30/2014    Performed by sIacc Ramírez M.D. at [facility]    • BIOPSY GENERAL  4/30/2014    left eye lid cyst removal   • SHOULDER ARTHROSCOPY W/ ROTATOR CUFF REPAIR  2/17/2014    Performed by Dave Knox M.D. at SURGERY [facility]   • KNEE UNICOMPARTMENTAL  10/21/2013    Performed by Dave Knox M.D. at SURGERY [facility] ORS   • KNEE ARTHROSCOPY  8/14/08    Performed by ANDERSON PLASCENCIA at SURGERY SAME DAY [facility]   • MEDIAL MENISCECTOMY  8/14/08    Performed by ANDERSON PLASCENCIA at SURGERY SAME DAY [facility] ORS   • OTHER ORTHOPEDIC SURGERY  1987    right knee     Family History   Problem Relation Age of Onset   • Heart Disease Father    • Diabetes Father    • Heart Attack Mother 77   • Heart Disease Sister      surgery at age 10   • Stroke       History   Smoking Status   • Former Smoker   • Packs/day: 1.00   • Years: 13.00   • Types: Cigarettes   • Start date: 1/1/1967   • Quit date: 1/1/1980   Smokeless Tobacco   • Never Used     No Known Allergies  Outpatient Encounter Prescriptions as of 1/31/2018   Medication Sig Dispense Refill   • terbinafine (LAMISIL) 250 MG Tab Take 250 mg by mouth every day.     • metoprolol SR (TOPROL XL) 25 MG TABLET SR 24 HR Take 0.5 Tabs by mouth every bedtime. 30 Tab 1   • propafenone (RYTHMOL) 225 MG tablet TAKE 1 TABLET BY MOUTH EVERY 8 HOURS 270 Tab 2   • fenofibrate (TRICOR) 145 MG Tab Take 145 mg by mouth every day.     • loratadine (CLARITIN) 10 MG Tab Take 10 mg by mouth every day.     • aspirin EC (ECOTRIN) 81 MG Tablet Delayed Response Take 81 mg by mouth every day.     • omeprazole (PRILOSEC) 20 MG delayed-release capsule Take 20 mg by mouth every bedtime.     • ranitidine (ZANTAC) 150 MG Tab Take 150 mg by mouth 2 times a day.     • tamsulosin (FLOMAX) 0.4 MG capsule Take 0.4 mg by mouth every bedtime.     • atorvastatin (LIPITOR) 20 MG TABS Take 20 mg by mouth every evening.       No facility-administered encounter medications on file as of 1/31/2018.      Review of Systems   Constitutional: Positive for  "malaise/fatigue.   Cardiovascular: Positive for palpitations.   All other systems reviewed and are negative.       Objective:   /80   Pulse 62   Ht 1.575 m (5' 2\")   Wt 95.7 kg (211 lb)   SpO2 95%   BMI 38.59 kg/m²     Physical Exam   Constitutional: He is oriented to person, place, and time. He appears well-developed and well-nourished. No distress.   HENT:   Head: Normocephalic and atraumatic.   Right Ear: External ear normal.   Left Ear: External ear normal.   Nose: Nose normal.   Mouth/Throat: Oropharynx is clear and moist.   Eyes: Conjunctivae and EOM are normal. Pupils are equal, round, and reactive to light. Right eye exhibits no discharge. Left eye exhibits no discharge. No scleral icterus.   Neck: Normal range of motion. Neck supple. No JVD present. No tracheal deviation present.   Cardiovascular: Normal rate, regular rhythm, normal heart sounds and intact distal pulses.  Exam reveals no gallop and no friction rub.    No murmur heard.  Pulmonary/Chest: Effort normal and breath sounds normal. No stridor. No respiratory distress. He has no wheezes. He has no rales. He exhibits no tenderness.   Abdominal: Soft. He exhibits no distension. There is no tenderness.   Musculoskeletal: He exhibits no edema or tenderness.   Neurological: He is alert and oriented to person, place, and time. No cranial nerve deficit. Coordination normal.   Skin: Skin is warm and dry. No rash noted. He is not diaphoretic. No erythema. No pallor.   Psychiatric: He has a normal mood and affect. His behavior is normal. Judgment and thought content normal.   Vitals reviewed.      Assessment:     1. Atrial fibrillation, unspecified type (CMS-HCC)  EKG     reveiwed and confirm rate controlled fib   ecg today sinus dhara  preop labs ordered.  Medical Decision Making:  Today's Assessment / Status / Plan:   Offered eps and ablation with recurrence.  He wants to proceed.  Will do pvi and cti with apparent flutter in 2013  He " understands risk and benefits and wants to proceed.

## 2018-01-31 NOTE — TELEPHONE ENCOUNTER
ESTUARDO on Karely's voicemail to call back to schedule an afib ablation with Dr. Sampson. I've held time on 3-6-18.

## 2018-02-01 ENCOUNTER — HOME STUDY (OUTPATIENT)
Dept: PULMONOLOGY | Facility: HOSPICE | Age: 63
End: 2018-02-01
Attending: NURSE PRACTITIONER
Payer: COMMERCIAL

## 2018-02-01 ENCOUNTER — OFFICE VISIT (OUTPATIENT)
Dept: PULMONOLOGY | Facility: HOSPICE | Age: 63
End: 2018-02-01
Payer: COMMERCIAL

## 2018-02-01 VITALS
WEIGHT: 211 LBS | SYSTOLIC BLOOD PRESSURE: 118 MMHG | BODY MASS INDEX: 38.83 KG/M2 | HEIGHT: 62 IN | TEMPERATURE: 97.7 F | OXYGEN SATURATION: 99 % | HEART RATE: 65 BPM | RESPIRATION RATE: 16 BRPM | DIASTOLIC BLOOD PRESSURE: 88 MMHG

## 2018-02-01 DIAGNOSIS — I10 ESSENTIAL HYPERTENSION: ICD-10-CM

## 2018-02-01 DIAGNOSIS — I48.0 PAF (PAROXYSMAL ATRIAL FIBRILLATION) (HCC): ICD-10-CM

## 2018-02-01 DIAGNOSIS — G47.33 OSA (OBSTRUCTIVE SLEEP APNEA): ICD-10-CM

## 2018-02-01 DIAGNOSIS — J45.20 MILD INTERMITTENT ASTHMA WITHOUT COMPLICATION: ICD-10-CM

## 2018-02-01 DIAGNOSIS — J30.9 ALLERGIC RHINITIS, UNSPECIFIED CHRONICITY, UNSPECIFIED SEASONALITY, UNSPECIFIED TRIGGER: ICD-10-CM

## 2018-02-01 PROCEDURE — 94762 N-INVAS EAR/PLS OXIMTRY CONT: CPT | Performed by: INTERNAL MEDICINE

## 2018-02-01 PROCEDURE — 99214 OFFICE O/P EST MOD 30 MIN: CPT | Mod: 25 | Performed by: NURSE PRACTITIONER

## 2018-02-01 NOTE — TELEPHONE ENCOUNTER
Patient scheduled for an afib ablation on 3-6-18 at [facility] with Dr. Sampson.

## 2018-02-01 NOTE — PATIENT INSTRUCTIONS
Plan:    1) Continue Auto CPAP 8-14 CM H20. Overnight oximetry now to ensure adequate nocturnal saturations on therapy. He can call for results. He is going to take an oximeter home tonight and his daughter will bring it back tomorrow with his compliance chip for download.   2) Sleep hygiene discussed.  3) Continue to follow with Cardiology. Pending ablation.   4) Continue Singulair 10 mg 1 po qhs. Continue Proair as needed.   5) He is up to date on Prevnar 13, Pneumovax 23 and Influenza vaccines.   6) Follow up in 1 year, sooner if needed.

## 2018-02-01 NOTE — PROGRESS NOTES
Chief Complaint   Patient presents with   • Apnea   • Asthma   • Allergic Rhinitis       HPI:  Veronn Saldaña is a 62 y.o. year old male here today for follow-up on his Asthma and SAUL. He was last seen in the office in December 2015. He has a history of severe SAUL with AHI of 62 per hour and O2 desat tim to 72%. He is compliant on Auto CPAP at 8-14 CM H20. Compliance card download at his last office visit in December 2015 indicated an AHI of 2.3 with an average use of 5 hours at night. He did not bring his compliance chip to today's office visit or his last office visit. He tolerates the pressure. He does feel he wakes more refreshed. He denies any morning headaches. He uses the nasal pillows which he feels is more comfortable. He states his machine was not working properly and he received a new machine last week.   He does have a history of allergic rhinitis and mild Asthma. He is compliant on Singulair 10 mg daily along with a Proair inhaler. He has not required use of his Proair inhaler during the past year. He is off his Dymista nasal spray. He had been on Symbicort, but stopped this 2 years ago due to symptomatic improvement. He feels his Asthma symptoms are stable. His dyspnea has resolved since going on the CPAP. He denies any cough, mucous or wheeze. He does have frequent runny nose and sinus congestion, which he feels overall has improved. He denies any recent respiratory infections. PFT's December 2015 indicated an FEV1 of 3.14 L, 108% predicted with an FEV1/FVC ratio of 87 and a DLCO of 196% predicted. Spirometry was updated today in the office and indicates an FEV1 of 2.98 L, 104% predicted with an FEV1/FVC ratio of 84 without a positive bronchodilator response. RA 02 saturation today in the office is 99%.   He does have a history of atrial fibrillation and is followed by Dr. Ryan, Cardiology. He is pending an upcoming ablation. He has been experiencing dizziness. Otherwise he denies dyspnea  or chest pain.   Chest xray 1/4/2018 is clear.         Past Medical History:   Diagnosis Date   • Arrhythmia 2013    A Flutter   • Arthritis     all joints (osteoarthritis)   • ASTHMA     inhalers   • Atrial fibrillation (CMS-HCC)    • Cholesterol blood decreased    • Enlarged prostate    • Heart burn    • High cholesterol    • HTN (hypertension) 8/19/2013   • Hypercholesteremia    • PAF (paroxysmal atrial fibrillation) (CMS-[facility]) 8/19/2013   • Pain 2/10/14    10/10 right shoulder   • Pneumonia 90's   • Preop cardiovascular exam 8/19/2013   • Sleep apnea     cpap    • Snoring        Past Surgical History:   Procedure Laterality Date   • PTERYGIUM EXCISION Right 3/8/2016    Procedure: PTERYGIUM EXCISION;  Surgeon: Homer Parsons M.D.;  Location: [facility];  Service:    • PTERYGIUM EXCISION Left 11/11/2015    Procedure: PTERYGIUM EXCISION W/AMNIOGRAFT AND MITOMYCIN;  Surgeon: Homer Parsons M.D.;  Location: [facility];  Service:    • KNEE MANIPULATION Right 6/29/2015    Procedure: KNEE MANIPULATION;  Surgeon: Dave Knox M.D.;  Location: SURGERY SAME DAY [facility];  Service:    • KNEE ARTHROPLASTY TOTAL Right 5/11/2015    Procedure: KNEE ARTHROPLASTY TOTAL;  Surgeon: Dave Knox M.D.;  Location: [facility];  Service:    • FULL THICKNESS BLOCK RESECTION  4/30/2014    Performed by Isacc Ramírez M.D. at [facility]   • BIOPSY GENERAL  4/30/2014    left eye lid cyst removal   • SHOULDER ARTHROSCOPY W/ ROTATOR CUFF REPAIR  2/17/2014    Performed by Dave Knox M.D. at SURGERY [facility]   • KNEE UNICOMPARTMENTAL  10/21/2013    Performed by Dave Knox M.D. at SURGERY [facility]   • KNEE ARTHROSCOPY  8/14/08    Performed by ANDERSON PLASCENCIA at SURGERY SAME DAY [facility]   • MEDIAL MENISCECTOMY  8/14/08    Performed by ANDERSON PLASCENCIA at SURGERY SAME DAY [facility]   • OTHER ORTHOPEDIC SURGERY  1987    right knee        Family History   Problem Relation Age of Onset   • Heart Disease Father    • Diabetes Father    • Heart Attack Mother 77   • Heart Disease Sister      surgery at age 10   • Stroke         Social History     Social History   • Marital status:      Spouse name: N/A   • Number of children: N/A   • Years of education: N/A     Occupational History   • Not on file.     Social History Main Topics   • Smoking status: Former Smoker     Packs/day: 1.00     Years: 13.00     Types: Cigarettes     Start date: 1/1/1967     Quit date: 1/1/1980   • Smokeless tobacco: Never Used   • Alcohol use No   • Drug use: No   • Sexual activity: Yes     Partners: Female     Other Topics Concern   • Not on file     Social History Narrative   • No narrative on file       ROS:  Constitutional: Denies fevers, chills, sweats, fatigue, weight loss  Eyes: Denies vision loss, pain, drainage, double vision. Wears glasses   Ears/Nose/Mouth/Throat: Denies ear ache, difficulty hearing, sore throat, persistent hoarseness, decayed teeth/toothache. Positive rhinitis   Cardiovascular: Denies chest pain, tightness, swelling in feet/legs, fainting, difficulty breathing when laying down  Respiratory: See HPI   GI: Denies heartburn, difficulty swallowing, nausea, vomiting, abdominal pain, diarrhea, constipation  : Denies frequent urination, painful urination  Integumentary: Denies rashes, lumps or color changes  MSK: Denies painful joints, sore muscles, and back pain.   Neurological: Denies frequent headaches, weakness  Sleep: See HPI       Current Outpatient Prescriptions   Medication Sig Dispense Refill   • terbinafine (LAMISIL) 250 MG Tab Take 250 mg by mouth every day.     • metoprolol SR (TOPROL XL) 25 MG TABLET SR 24 HR Take 0.5 Tabs by mouth every bedtime. 30 Tab 1   • propafenone (RYTHMOL) 225 MG tablet TAKE 1 TABLET BY MOUTH EVERY 8 HOURS 270 Tab 2   • fenofibrate (TRICOR) 145 MG Tab Take 145 mg by mouth every day.     • loratadine  "(CLARITIN) 10 MG Tab Take 10 mg by mouth every day.     • aspirin EC (ECOTRIN) 81 MG Tablet Delayed Response Take 81 mg by mouth every day.     • omeprazole (PRILOSEC) 20 MG delayed-release capsule Take 20 mg by mouth every bedtime.     • ranitidine (ZANTAC) 150 MG Tab Take 150 mg by mouth 2 times a day.     • tamsulosin (FLOMAX) 0.4 MG capsule Take 0.4 mg by mouth every bedtime.     • atorvastatin (LIPITOR) 20 MG TABS Take 20 mg by mouth every evening.       No current facility-administered medications for this visit.        No Known Allergies    Blood pressure 118/88, pulse 65, temperature 36.5 °C (97.7 °F), resp. rate 16, height 1.575 m (5' 2\"), weight 95.7 kg (211 lb), SpO2 99 %.    PE:   Appearance: Well developed, well nourished, no acute distress  Eyes: PERRL, EOM intact, sclera white, conjunctiva moist  Ears: no lesions or deformities  Hearing: grossly intact  Nose: no lesions or deformities  Oropharynx: tongue normal, posterior pharynx without erythema or exudate  Mallampati Classification: class 4  Neck: supple, trachea midline, no masses   Respiratory effort: no intercostal retractions or use of accessory muscles  Lung auscultation: no rales, rhonchi or wheezes  Heart auscultation: no murmur rub or gallop  Extremities: no cyanosis or edema  Abdomen: soft ,non tender, no masses  Gait and Station: normal  Digits and nails: no clubbing, cyanosis, petechiae or nodes.  Cranial nerves: grossly intact  Skin: no rashes, lesions or ulcers noted  Orientation: Oriented to time, person and place  Mood and affect: mood and affect appropriate, normal interaction with examiner  Judgement: Intact          Assessment:  1. SAUL (obstructive sleep apnea)  OVERNIGHT OXIMETRY   2. Mild intermittent asthma without complication     3. Allergic rhinitis, unspecified chronicity, unspecified seasonality, unspecified trigger     4. PAF (paroxysmal atrial fibrillation) (CMS-[facility])     5. Essential hypertension           Plan:    1) " Continue Auto CPAP 8-14 CM H20. Overnight oximetry now to ensure adequate nocturnal saturations on therapy. He can call for results. He is going to take an oximeter home tonight and his daughter will bring it back tomorrow with his compliance chip for download.   2) Sleep hygiene discussed.  3) Continue to follow with Cardiology. Pending ablation.   4) Continue Singulair 10 mg 1 po qhs. Continue Proair as needed.   5) He is up to date on Prevnar 13, Pneumovax 23 and Influenza vaccines.   6) Follow up in 1 year, sooner if needed.

## 2018-02-02 ENCOUNTER — APPOINTMENT (OUTPATIENT)
Dept: PULMONOLOGY | Facility: HOSPICE | Age: 63
End: 2018-02-02
Payer: MEDICARE

## 2018-02-14 ENCOUNTER — TELEPHONE (OUTPATIENT)
Dept: PULMONOLOGY | Facility: HOSPICE | Age: 63
End: 2018-02-14

## 2018-02-14 ENCOUNTER — HOSPITAL ENCOUNTER (OUTPATIENT)
Dept: LAB | Facility: MEDICAL CENTER | Age: 63
End: 2018-02-14
Attending: FAMILY MEDICINE
Payer: COMMERCIAL

## 2018-02-14 LAB
ALBUMIN SERPL BCP-MCNC: 4 G/DL (ref 3.2–4.9)
ALBUMIN/GLOB SERPL: 1.4 G/DL
ALP SERPL-CCNC: 32 U/L (ref 30–99)
ALT SERPL-CCNC: 33 U/L (ref 2–50)
ANION GAP SERPL CALC-SCNC: 7 MMOL/L (ref 0–11.9)
AST SERPL-CCNC: 22 U/L (ref 12–45)
BILIRUB SERPL-MCNC: 0.4 MG/DL (ref 0.1–1.5)
BUN SERPL-MCNC: 25 MG/DL (ref 8–22)
CALCIUM SERPL-MCNC: 9.2 MG/DL (ref 8.5–10.5)
CHLORIDE SERPL-SCNC: 108 MMOL/L (ref 96–112)
CHOLEST SERPL-MCNC: 162 MG/DL (ref 100–199)
CO2 SERPL-SCNC: 26 MMOL/L (ref 20–33)
CREAT SERPL-MCNC: 1.12 MG/DL (ref 0.5–1.4)
GLOBULIN SER CALC-MCNC: 2.8 G/DL (ref 1.9–3.5)
GLUCOSE SERPL-MCNC: 93 MG/DL (ref 65–99)
HDLC SERPL-MCNC: 37 MG/DL
LDLC SERPL CALC-MCNC: 107 MG/DL
POTASSIUM SERPL-SCNC: 4.3 MMOL/L (ref 3.6–5.5)
PROT SERPL-MCNC: 6.8 G/DL (ref 6–8.2)
SODIUM SERPL-SCNC: 141 MMOL/L (ref 135–145)
TRIGL SERPL-MCNC: 91 MG/DL (ref 0–149)

## 2018-02-14 PROCEDURE — 36415 COLL VENOUS BLD VENIPUNCTURE: CPT

## 2018-02-14 PROCEDURE — 80053 COMPREHEN METABOLIC PANEL: CPT

## 2018-02-14 PROCEDURE — 80061 LIPID PANEL: CPT

## 2018-02-14 NOTE — PROCEDURES
Overnight oximetry was completed on February 1, 2018. This was done on CPAP ranging 8-14 cm. Very mild oxygen desaturation was seen, below 90% only 3.5% a time monitored. Some clustering does suggest there may be some breakthrough sleep-disordered breathing but maintenance of oxygen saturation is noted

## 2018-02-15 NOTE — TELEPHONE ENCOUNTER
Patient's daughter Josephine called our office back and I gave her Sally's below message. She let me know that Vernon will be going in to have an A-Fib Ablation procedure performed on 3-6-18 and they do plan on taking his machine with them at that time.

## 2018-02-15 NOTE — TELEPHONE ENCOUNTER
Attempted to reach patient on both phone numbers listed in chart. Left message for daughter of patient to call our office back per preference notes in demographics.

## 2018-02-15 NOTE — TELEPHONE ENCOUNTER
Please let pt know his chip download indicates he is using his machine for over 8 hours at night. He is down from 62 events an hour to 8. His oxygen study indicated overall adequate 02 saturations. He only spent 3% of the night with saturations less than 90%. If he is sleeping well on therapy then we will make no changes and see him back in 1 year.

## 2018-03-05 DIAGNOSIS — Z01.810 PRE-OPERATIVE CARDIOVASCULAR EXAMINATION: ICD-10-CM

## 2018-03-05 DIAGNOSIS — Z01.812 PRE-PROCEDURAL LABORATORY EXAMINATION: ICD-10-CM

## 2018-03-05 LAB
ALBUMIN SERPL BCP-MCNC: 4.2 G/DL (ref 3.2–4.9)
ALBUMIN/GLOB SERPL: 1.4 G/DL
ALP SERPL-CCNC: 40 U/L (ref 30–99)
ALT SERPL-CCNC: 35 U/L (ref 2–50)
ANION GAP SERPL CALC-SCNC: 8 MMOL/L (ref 0–11.9)
AST SERPL-CCNC: 25 U/L (ref 12–45)
BASOPHILS # BLD AUTO: 0.6 % (ref 0–1.8)
BASOPHILS # BLD: 0.03 K/UL (ref 0–0.12)
BILIRUB SERPL-MCNC: 0.5 MG/DL (ref 0.1–1.5)
BUN SERPL-MCNC: 18 MG/DL (ref 8–22)
CALCIUM SERPL-MCNC: 9.7 MG/DL (ref 8.5–10.5)
CHLORIDE SERPL-SCNC: 105 MMOL/L (ref 96–112)
CO2 SERPL-SCNC: 28 MMOL/L (ref 20–33)
CREAT SERPL-MCNC: 1.1 MG/DL (ref 0.5–1.4)
EKG IMPRESSION: NORMAL
EOSINOPHIL # BLD AUTO: 0.27 K/UL (ref 0–0.51)
EOSINOPHIL NFR BLD: 5 % (ref 0–6.9)
ERYTHROCYTE [DISTWIDTH] IN BLOOD BY AUTOMATED COUNT: 42.5 FL (ref 35.9–50)
GLOBULIN SER CALC-MCNC: 3 G/DL (ref 1.9–3.5)
GLUCOSE SERPL-MCNC: 102 MG/DL (ref 65–99)
HCT VFR BLD AUTO: 50.1 % (ref 42–52)
HGB BLD-MCNC: 17.2 G/DL (ref 14–18)
IMM GRANULOCYTES # BLD AUTO: 0.03 K/UL (ref 0–0.11)
IMM GRANULOCYTES NFR BLD AUTO: 0.6 % (ref 0–0.9)
INR PPP: 0.99 (ref 0.87–1.13)
LYMPHOCYTES # BLD AUTO: 2.46 K/UL (ref 1–4.8)
LYMPHOCYTES NFR BLD: 45.5 % (ref 22–41)
MCH RBC QN AUTO: 32 PG (ref 27–33)
MCHC RBC AUTO-ENTMCNC: 34.3 G/DL (ref 33.7–35.3)
MCV RBC AUTO: 93.1 FL (ref 81.4–97.8)
MONOCYTES # BLD AUTO: 0.49 K/UL (ref 0–0.85)
MONOCYTES NFR BLD AUTO: 9.1 % (ref 0–13.4)
NEUTROPHILS # BLD AUTO: 2.13 K/UL (ref 1.82–7.42)
NEUTROPHILS NFR BLD: 39.2 % (ref 44–72)
NRBC # BLD AUTO: 0.04 K/UL
NRBC BLD-RTO: 0.7 /100 WBC
PLATELET # BLD AUTO: 220 K/UL (ref 164–446)
PMV BLD AUTO: 11.6 FL (ref 9–12.9)
POTASSIUM SERPL-SCNC: 4.8 MMOL/L (ref 3.6–5.5)
PROT SERPL-MCNC: 7.2 G/DL (ref 6–8.2)
PROTHROMBIN TIME: 12.8 SEC (ref 12–14.6)
RBC # BLD AUTO: 5.38 M/UL (ref 4.7–6.1)
SODIUM SERPL-SCNC: 141 MMOL/L (ref 135–145)
WBC # BLD AUTO: 5.4 K/UL (ref 4.8–10.8)

## 2018-03-05 PROCEDURE — 36415 COLL VENOUS BLD VENIPUNCTURE: CPT

## 2018-03-05 PROCEDURE — 93005 ELECTROCARDIOGRAM TRACING: CPT

## 2018-03-05 PROCEDURE — 85025 COMPLETE CBC W/AUTO DIFF WBC: CPT

## 2018-03-05 PROCEDURE — 80053 COMPREHEN METABOLIC PANEL: CPT

## 2018-03-05 PROCEDURE — 85610 PROTHROMBIN TIME: CPT

## 2018-03-05 PROCEDURE — 93010 ELECTROCARDIOGRAM REPORT: CPT | Performed by: INTERNAL MEDICINE

## 2018-03-06 ENCOUNTER — HOSPITAL ENCOUNTER (OUTPATIENT)
Facility: MEDICAL CENTER | Age: 63
End: 2018-03-07
Attending: INTERNAL MEDICINE | Admitting: INTERNAL MEDICINE
Payer: COMMERCIAL

## 2018-03-06 LAB
ACT BLD: 219 SEC (ref 74–137)
ACT BLD: 257 SEC (ref 74–137)
EKG IMPRESSION: NORMAL

## 2018-03-06 PROCEDURE — C1732 CATH, EP, DIAG/ABL, 3D/VECT: HCPCS

## 2018-03-06 PROCEDURE — 305387 HCHG SUTURES

## 2018-03-06 PROCEDURE — C1894 INTRO/SHEATH, NON-LASER: HCPCS

## 2018-03-06 PROCEDURE — C1731 CATH, EP, 20 OR MORE ELEC: HCPCS

## 2018-03-06 PROCEDURE — 304952 HCHG R 2 PADS

## 2018-03-06 PROCEDURE — 93010 ELECTROCARDIOGRAM REPORT: CPT | Performed by: INTERNAL MEDICINE

## 2018-03-06 PROCEDURE — 93662 INTRACARDIAC ECG (ICE): CPT

## 2018-03-06 PROCEDURE — 360995 HCHG BAYLIS TRANSSEPTAL NEEDLE

## 2018-03-06 PROCEDURE — 305545 HCHG DOUBLE TRANSDUCER

## 2018-03-06 PROCEDURE — 700101 HCHG RX REV CODE 250

## 2018-03-06 PROCEDURE — 85347 COAGULATION TIME ACTIVATED: CPT

## 2018-03-06 PROCEDURE — 700111 HCHG RX REV CODE 636 W/ 250 OVERRIDE (IP)

## 2018-03-06 PROCEDURE — C1759 CATH, INTRA ECHOCARDIOGRAPHY: HCPCS

## 2018-03-06 PROCEDURE — A9270 NON-COVERED ITEM OR SERVICE: HCPCS | Performed by: INTERNAL MEDICINE

## 2018-03-06 PROCEDURE — 93655 ICAR CATH ABLTJ DSCRT ARRHYT: CPT

## 2018-03-06 PROCEDURE — C1893 INTRO/SHEATH, FIXED,NON-PEEL: HCPCS

## 2018-03-06 PROCEDURE — 93613 INTRACARDIAC EPHYS 3D MAPG: CPT

## 2018-03-06 PROCEDURE — 360980 HCHG SINGLE TRANSDUCER

## 2018-03-06 PROCEDURE — 160002 HCHG RECOVERY MINUTES (STAT)

## 2018-03-06 PROCEDURE — 305383 HCHG CARTO3 REF PATCH

## 2018-03-06 PROCEDURE — 93623 PRGRMD STIMJ&PACG IV RX NFS: CPT

## 2018-03-06 PROCEDURE — 00537 ANES CARDIAC EP PROCEDURES: CPT

## 2018-03-06 PROCEDURE — 93656 COMPRE EP EVAL ABLTJ ATR FIB: CPT

## 2018-03-06 PROCEDURE — 93621 COMP EP EVL L PAC&REC C SINS: CPT

## 2018-03-06 PROCEDURE — 36620 INSERTION CATHETER ARTERY: CPT

## 2018-03-06 PROCEDURE — G0378 HOSPITAL OBSERVATION PER HR: HCPCS

## 2018-03-06 PROCEDURE — 700102 HCHG RX REV CODE 250 W/ 637 OVERRIDE(OP): Performed by: INTERNAL MEDICINE

## 2018-03-06 PROCEDURE — 93005 ELECTROCARDIOGRAM TRACING: CPT | Performed by: INTERNAL MEDICINE

## 2018-03-06 RX ORDER — FENOFIBRATE 67 MG/1
134 CAPSULE ORAL DAILY
Status: DISCONTINUED | OUTPATIENT
Start: 2018-03-07 | End: 2018-03-07 | Stop reason: HOSPADM

## 2018-03-06 RX ORDER — FAMOTIDINE 20 MG/1
20 TABLET, FILM COATED ORAL 2 TIMES DAILY
Status: DISCONTINUED | OUTPATIENT
Start: 2018-03-06 | End: 2018-03-07 | Stop reason: HOSPADM

## 2018-03-06 RX ORDER — OMEPRAZOLE 20 MG/1
20 CAPSULE, DELAYED RELEASE ORAL
Status: DISCONTINUED | OUTPATIENT
Start: 2018-03-06 | End: 2018-03-07 | Stop reason: HOSPADM

## 2018-03-06 RX ORDER — ATORVASTATIN CALCIUM 20 MG/1
20 TABLET, FILM COATED ORAL NIGHTLY
Status: DISCONTINUED | OUTPATIENT
Start: 2018-03-06 | End: 2018-03-07 | Stop reason: HOSPADM

## 2018-03-06 RX ORDER — ONDANSETRON 2 MG/ML
4 INJECTION INTRAMUSCULAR; INTRAVENOUS EVERY 6 HOURS PRN
Status: DISCONTINUED | OUTPATIENT
Start: 2018-03-06 | End: 2018-03-07 | Stop reason: HOSPADM

## 2018-03-06 RX ORDER — PROPAFENONE HYDROCHLORIDE 150 MG/1
225 TABLET, COATED ORAL EVERY 8 HOURS
Status: DISCONTINUED | OUTPATIENT
Start: 2018-03-06 | End: 2018-03-07 | Stop reason: HOSPADM

## 2018-03-06 RX ORDER — TAMSULOSIN HYDROCHLORIDE 0.4 MG/1
0.4 CAPSULE ORAL
Status: DISCONTINUED | OUTPATIENT
Start: 2018-03-06 | End: 2018-03-07 | Stop reason: HOSPADM

## 2018-03-06 RX ORDER — OXYCODONE HYDROCHLORIDE 5 MG/1
2.5 TABLET ORAL
Status: DISCONTINUED | OUTPATIENT
Start: 2018-03-06 | End: 2018-03-07 | Stop reason: HOSPADM

## 2018-03-06 RX ORDER — MIDAZOLAM HYDROCHLORIDE 1 MG/ML
INJECTION INTRAMUSCULAR; INTRAVENOUS
Status: DISPENSED
Start: 2018-03-06 | End: 2018-03-06

## 2018-03-06 RX ORDER — TERBINAFINE HYDROCHLORIDE 250 MG/1
250 TABLET ORAL DAILY
Status: DISCONTINUED | OUTPATIENT
Start: 2018-03-06 | End: 2018-03-07 | Stop reason: HOSPADM

## 2018-03-06 RX ORDER — OMEPRAZOLE 20 MG/1
20 CAPSULE, DELAYED RELEASE ORAL
Status: DISCONTINUED | OUTPATIENT
Start: 2018-03-06 | End: 2018-03-06

## 2018-03-06 RX ORDER — LORATADINE 10 MG/1
10 TABLET ORAL DAILY
Status: DISCONTINUED | OUTPATIENT
Start: 2018-03-06 | End: 2018-03-07 | Stop reason: HOSPADM

## 2018-03-06 RX ORDER — METOPROLOL SUCCINATE 25 MG/1
12.5 TABLET, EXTENDED RELEASE ORAL
Status: DISCONTINUED | OUTPATIENT
Start: 2018-03-06 | End: 2018-03-07 | Stop reason: HOSPADM

## 2018-03-06 RX ORDER — ISOPROTERENOL HYDROCHLORIDE 0.2 MG/ML
INJECTION, SOLUTION INTRAVENOUS
Status: COMPLETED
Start: 2018-03-06 | End: 2018-03-06

## 2018-03-06 RX ORDER — SUCRALFATE 1 G/1
1 TABLET ORAL
Status: DISCONTINUED | OUTPATIENT
Start: 2018-03-06 | End: 2018-03-07 | Stop reason: HOSPADM

## 2018-03-06 RX ORDER — LIDOCAINE HYDROCHLORIDE 20 MG/ML
INJECTION, SOLUTION INFILTRATION; PERINEURAL
Status: COMPLETED
Start: 2018-03-06 | End: 2018-03-06

## 2018-03-06 RX ORDER — PROTAMINE SULFATE 10 MG/ML
INJECTION, SOLUTION INTRAVENOUS
Status: DISPENSED
Start: 2018-03-06 | End: 2018-03-07

## 2018-03-06 RX ORDER — HEPARIN SODIUM,PORCINE 1000/ML
VIAL (ML) INJECTION
Status: COMPLETED
Start: 2018-03-06 | End: 2018-03-06

## 2018-03-06 RX ADMIN — SUCRALFATE 1 G: 1 TABLET ORAL at 20:19

## 2018-03-06 RX ADMIN — LIDOCAINE HYDROCHLORIDE: 20 INJECTION, SOLUTION INFILTRATION; PERINEURAL at 12:08

## 2018-03-06 RX ADMIN — ISOPROTERENOL HYDROCHLORIDE 200 MCG: 0.2 INJECTION, SOLUTION INTRACARDIAC; INTRAMUSCULAR; INTRAVENOUS; SUBCUTANEOUS at 14:00

## 2018-03-06 RX ADMIN — METOPROLOL SUCCINATE 12.5 MG: 25 TABLET, EXTENDED RELEASE ORAL at 20:19

## 2018-03-06 RX ADMIN — LORATADINE 10 MG: 10 TABLET ORAL at 17:25

## 2018-03-06 RX ADMIN — SUCRALFATE 1 G: 1 TABLET ORAL at 17:26

## 2018-03-06 RX ADMIN — TERBINAFINE 250 MG: 250 TABLET ORAL at 17:56

## 2018-03-06 RX ADMIN — PROPAFENONE HYDROCHLORIDE 225 MG: 150 TABLET, FILM COATED ORAL at 17:56

## 2018-03-06 RX ADMIN — FAMOTIDINE 20 MG: 20 TABLET, FILM COATED ORAL at 20:19

## 2018-03-06 RX ADMIN — TAMSULOSIN HYDROCHLORIDE 0.4 MG: 0.4 CAPSULE ORAL at 20:19

## 2018-03-06 RX ADMIN — HEPARIN SODIUM 2000 UNITS: 200 INJECTION, SOLUTION INTRAVENOUS at 12:08

## 2018-03-06 RX ADMIN — HEPARIN SODIUM: 1000 INJECTION, SOLUTION INTRAVENOUS; SUBCUTANEOUS at 12:09

## 2018-03-06 RX ADMIN — ATORVASTATIN CALCIUM 20 MG: 20 TABLET, FILM COATED ORAL at 20:19

## 2018-03-06 RX ADMIN — OMEPRAZOLE 20 MG: 20 CAPSULE, DELAYED RELEASE ORAL at 17:25

## 2018-03-06 ASSESSMENT — COGNITIVE AND FUNCTIONAL STATUS - GENERAL
SUGGESTED CMS G CODE MODIFIER DAILY ACTIVITY: CH
SUGGESTED CMS G CODE MODIFIER MOBILITY: CH
DAILY ACTIVITIY SCORE: 24
MOBILITY SCORE: 24

## 2018-03-06 ASSESSMENT — LIFESTYLE VARIABLES
ALCOHOL_USE: NO
EVER_SMOKED: YES

## 2018-03-06 ASSESSMENT — PAIN SCALES - GENERAL
PAINLEVEL_OUTOF10: 0

## 2018-03-06 ASSESSMENT — ENCOUNTER SYMPTOMS
PALPITATIONS: 1
PALPITATIONS: 0

## 2018-03-06 NOTE — PROGRESS NOTES
Report received and Pt admitted to [facility] s/p a fib ablation. Right and left groin and right IJ soft non-tender with gauze and tegaderm in place. Right radial art line in place. Sites CDI with no s/s of bleeding or hematoma. Pt attached to all leads and monitors. VSS with no complaints of pain or nausea. Fluids and food offered. Orders reviewed.  Family at bedside.     1530: Right art line removed with no issues noted.

## 2018-03-06 NOTE — H&P
Physician H&P    Patient ID:  Vernon Hernandez  [id number]  62 y.o. male  1955    History:  Primary Diagnosis: paf and flutter    HPI:  Pt well known to me as we first met in 2013.  Presenting for ablation.  See note 1/31/18    Past Medical History:  has a past medical history of Arrhythmia (2013); Arthritis; ASTHMA; Atrial fibrillation (CMS-HCC); Cataract (03/05/2018); Cholesterol blood decreased; Enlarged prostate; Heart burn; Hiatus hernia syndrome; High cholesterol; HTN (hypertension) (8/19/2013); Hypercholesteremia; PAF (paroxysmal atrial fibrillation) (CMS-HCC) (8/19/2013); Pain (2/10/14); Pneumonia (90's); Preop cardiovascular exam (8/19/2013); Sleep apnea; and Snoring. He also has no past medical history of Encounter for long-term (current) use of other medications.  Past Surgical History:  has a past surgical history that includes knee arthroscopy (8/14/08); medial meniscectomy (8/14/08); other orthopedic surgery (1987); knee unicompartmental (10/21/2013); shoulder arthroscopy w/ rotator cuff repair (2/17/2014); full thickness block resection (4/30/2014); biopsy general (4/30/2014); knee arthroplasty total (Right, 5/11/2015); knee manipulation (Right, 6/29/2015); pterygium excision (Left, 11/11/2015); and pterygium excision (Right, 3/8/2016).  Past Social History:  reports that he quit smoking about 38 years ago. His smoking use included Cigarettes. He started smoking about 51 years ago. He has a 13.00 pack-year smoking history. He has never used smokeless tobacco. He reports that he does not drink alcohol or use drugs.  Past Family History:   Family History   Problem Relation Age of Onset   • Heart Disease Father    • Diabetes Father    • Heart Attack Mother 77   • Heart Disease Sister      surgery at age 10   • Stroke       Allergies: Patient has no known allergies.    Current Medications:  Prior to Admission medications    Medication Sig Start Date End Date Taking? Authorizing Provider  "  terbinafine (LAMISIL) 250 MG Tab Take 250 mg by mouth every day.    Physician Outpatient   metoprolol SR (TOPROL XL) 25 MG TABLET SR 24 HR Take 0.5 Tabs by mouth every bedtime. 1/5/18   Leeanna Jay M.D.   propafenone (RYTHMOL) 225 MG tablet TAKE 1 TABLET BY MOUTH EVERY 8 HOURS 10/6/17   Dave Ryan M.D.   fenofibrate (TRICOR) 145 MG Tab Take 145 mg by mouth every day.    Physician Outpatient   loratadine (CLARITIN) 10 MG Tab Take 10 mg by mouth every day.    Physician Outpatient   aspirin EC (ECOTRIN) 81 MG Tablet Delayed Response Take 81 mg by mouth every day.    Physician Outpatient   omeprazole (PRILOSEC) 20 MG delayed-release capsule Take 20 mg by mouth every bedtime.    Physician Outpatient   ranitidine (ZANTAC) 150 MG Tab Take 150 mg by mouth 2 times a day.    Physician Outpatient   tamsulosin (FLOMAX) 0.4 MG capsule Take 0.4 mg by mouth every bedtime.    Physician Outpatient   atorvastatin (LIPITOR) 20 MG TABS Take 20 mg by mouth every evening.    Physician Outpatient       Review of Systems:  Review of Systems   Cardiovascular: Negative for palpitations.     Blood pressure 125/70, pulse (!) 40, temperature 36.2 °C (97.2 °F), resp. rate 16, height 1.575 m (5' 2\"), weight 94.8 kg (208 lb 15.9 oz), SpO2 95 %.    Physical Examination:  Physical Exam   Constitutional: He is oriented to person, place, and time. He appears well-developed and well-nourished. No distress.   HENT:   Head: Normocephalic and atraumatic.   Right Ear: External ear normal.   Left Ear: External ear normal.   Nose: Nose normal.   Mouth/Throat: Oropharynx is clear and moist.   Eyes: Conjunctivae and EOM are normal. Pupils are equal, round, and reactive to light. Right eye exhibits no discharge. Left eye exhibits no discharge. No scleral icterus.   Neck: Normal range of motion. Neck supple. No JVD present. No tracheal deviation present.   Cardiovascular: Normal rate, regular rhythm, normal heart sounds and intact distal pulses. "  Exam reveals no gallop and no friction rub.    No murmur heard.  Pulmonary/Chest: Effort normal and breath sounds normal. No stridor. No respiratory distress. He has no wheezes. He has no rales. He exhibits no tenderness.   Abdominal: Soft. He exhibits no distension. There is no tenderness.   Musculoskeletal: He exhibits no edema or tenderness.   Neurological: He is alert and oriented to person, place, and time. No cranial nerve deficit. Coordination normal.   Skin: Skin is warm and dry. No rash noted. He is not diaphoretic. No erythema. No pallor.   Psychiatric: He has a normal mood and affect. His behavior is normal. Judgment and thought content normal.   Vitals reviewed.      Impression:  paf and apparent flutter with regular 150 rate arrhythmia on 2013 monitor    Plan:  eps and ablation with pvi and cti ablation    Pre Procedure Assessment:  Pre-Procedure Assessment - Applicable for patients receiving sedation by non-anesthesia practitioner.  Previous drug history, other anesthetic experiences, potential anesthetic problems and choice of anesthesia assessed, discussed with patient.     No prior complications.  .              Pre Procedure update:   No changes.    Gibson Sampson  3/6/2018

## 2018-03-06 NOTE — OR SURGEON
Immediate Post-Operative Note      PreOp Diagnosis: a fib and flutter    PostOp Diagnosis: same    Procedure(s) :  eps and ablation    Surgeon(s):  Gibson Sampson M.D.    Type of Anesthesia: general  Specimen: None    Estimated Blood Loss: 50 cc's    Contrast Media:  0 cc's    Fluoro Time: <10 min        Findings: pvi and cti line  noninducible    Complications: none      Gibson Sampson M.D.  3/6/2018 2:57 PM

## 2018-03-07 VITALS
HEIGHT: 62 IN | SYSTOLIC BLOOD PRESSURE: 127 MMHG | RESPIRATION RATE: 18 BRPM | HEART RATE: 72 BPM | WEIGHT: 219.8 LBS | BODY MASS INDEX: 40.45 KG/M2 | DIASTOLIC BLOOD PRESSURE: 82 MMHG | TEMPERATURE: 97.6 F | OXYGEN SATURATION: 94 %

## 2018-03-07 LAB — EKG IMPRESSION: NORMAL

## 2018-03-07 PROCEDURE — A9270 NON-COVERED ITEM OR SERVICE: HCPCS | Performed by: INTERNAL MEDICINE

## 2018-03-07 PROCEDURE — G0378 HOSPITAL OBSERVATION PER HR: HCPCS

## 2018-03-07 PROCEDURE — 93010 ELECTROCARDIOGRAM REPORT: CPT | Performed by: INTERNAL MEDICINE

## 2018-03-07 PROCEDURE — 700102 HCHG RX REV CODE 250 W/ 637 OVERRIDE(OP): Performed by: INTERNAL MEDICINE

## 2018-03-07 PROCEDURE — 93005 ELECTROCARDIOGRAM TRACING: CPT | Performed by: INTERNAL MEDICINE

## 2018-03-07 RX ORDER — SUCRALFATE 1 G/1
1 TABLET ORAL
Qty: 120 TAB | Refills: 0 | Status: SHIPPED | OUTPATIENT
Start: 2018-03-07 | End: 2018-12-14

## 2018-03-07 RX ORDER — SUCRALFATE 1 G/1
1 TABLET ORAL
Qty: 120 TAB | Refills: 0 | Status: SHIPPED | OUTPATIENT
Start: 2018-03-07 | End: 2018-03-07

## 2018-03-07 RX ADMIN — LORATADINE 10 MG: 10 TABLET ORAL at 09:48

## 2018-03-07 RX ADMIN — PROPAFENONE HYDROCHLORIDE 225 MG: 150 TABLET, FILM COATED ORAL at 02:26

## 2018-03-07 RX ADMIN — SUCRALFATE 1 G: 1 TABLET ORAL at 06:26

## 2018-03-07 RX ADMIN — OXYCODONE HYDROCHLORIDE 2.5 MG: 5 TABLET ORAL at 06:33

## 2018-03-07 RX ADMIN — PROPAFENONE HYDROCHLORIDE 225 MG: 150 TABLET, FILM COATED ORAL at 09:49

## 2018-03-07 RX ADMIN — FAMOTIDINE 20 MG: 20 TABLET, FILM COATED ORAL at 09:48

## 2018-03-07 RX ADMIN — FENOFIBRATE 134 MG: 67 CAPSULE ORAL at 09:48

## 2018-03-07 RX ADMIN — OMEPRAZOLE 20 MG: 20 CAPSULE, DELAYED RELEASE ORAL at 06:26

## 2018-03-07 ASSESSMENT — PAIN SCALES - GENERAL
PAINLEVEL_OUTOF10: 4
PAINLEVEL_OUTOF10: 0
PAINLEVEL_OUTOF10: 0
PAINLEVEL_OUTOF10: 4

## 2018-03-07 NOTE — CARE PLAN
Problem: Safety  Goal: Will remain free from falls  Reinforced w/ family and patient need to call to get out of bed to prevent falls.    Problem: Infection  Goal: Will remain free from infection  Educated family and patient on need for clean hands and oral care to prevent infection

## 2018-03-07 NOTE — PROGRESS NOTES
Pt sleeping w/ CPAP. No c/o pain.  Cath sites soft, CDI.  Pt still in NSR.  Pt Bps and pulse have been stable.

## 2018-03-07 NOTE — DISCHARGE PLANNING
MUMTAZ faxed over Xarelto and Carafate to pt's preferred pharmacy, [facility] on [address] because previous prescription had misspelling of his name.  [facility] reports Xarelto prescription will cost $35. MUMTAZ communicated with bedside nurse on above.     Plan:  MUMTAZ will continue to assist with pt's d/c needs.

## 2018-03-07 NOTE — PROGRESS NOTES
Pt requesting medication for pain prn once he leaves. Spoke with KEN Crawford, stated ibuprofen 400mg q 6hrs is appropriate for 2-3 days. Education provided to pt on risk of bleeding and if pain persists passed that time frame to call cardiology office.

## 2018-03-07 NOTE — PROGRESS NOTES
12h chart check - get pt's license from wife and update name on pt's chart - currently misspelled.

## 2018-03-07 NOTE — PROGRESS NOTES
Report received. Pt care assumed. Monitor on pt. Verified with monitor room. Pt O2 sats at 89% on RA, placed 2L on pt sats at 98% otherwise VSS. Assessment performed. Pt AOx4. Pt laying supine in bed. Pt denies pain and no signs of distress. Bed in low, locked position. Pt on bed rest till 1830. Call light within reach. Treaded socks on pt.  Hourly rounding in place.

## 2018-03-07 NOTE — DISCHARGE SUMMARY
DISCHARGE DIAGNOSES:  1.  Paroxysmal atrial fibrillation, recurrent despite antiarrhythmic therapy.  2.  Hypercholesterolemia.  3.  Obstructive sleep apnea.  4.  History of left partial knee replacement.  5.  Hypertension.  6.  Intermittent mild asthma.  7.  Hiatal hernia.    HISTORY OF PRESENT HOSPITALIZATION:  The patient is a 62-year-old    male followed longitudinally in our office by Dr. Dave Ryan and he was   referred to Dr. Sampson with recurrence of atrial fibrillation despite Rythmol   therapy.  Patient has had atrial fibrillation since August 2013.  He has   obstructive sleep apnea and utilizes CPAP for those diagnoses.  Patient was   therefore scheduled electively for pulmonary vein isolation.  He also had   evidence in the past of flutter and underwent flutter ablation as well with   Dr. Sampson on 03/06/2018.  Please see procedure report.  Complications of the   procedure were none.  Findings were PVI and CTI lines with noninducibility.    Final conclusions were:  1.  Presentation in normal sinus rhythm with normal conduction intervals.  2.  Successful pulmonary vein isolation.  3.  Successful cavotricuspid isthmus ablation with bidirectional block.  4.  No inducible tachycardia.  5.  Normal anatomy seen with intracardiac echocardiogram and 3D mapping that   were both used to decrease the use of fluoroscopy time down to 2.6 minutes for   this case.    DISCHARGE MEDICATIONS:  The patient will need to continue on oral   anticoagulation in the form of Xarelto for the next 3 months.  Other   medications will include atorvastatin 20 mg daily, Pepcid 20 mg daily, Lofibra   134 mg daily, Claritin 10 mg daily, metoprolol SR 12.5 mg at bedtime,   omeprazole 20 mg daily, Rythmol 225 mg q.8 hours, aspirin 81 mg daily, Xarelto   20 mg at bedtime, Carafate 1 gram before meals and at bedtime, Flomax 0.4 mg   at bedtime, and Lamisil 250 mg daily.    IMPRESSIONS:  1.  History of recurrent atrial fibrillation  despite antiarrhythmic therapy.  2.  History of atrial flutter.  3.  Both pulmonary vein isolation as well as atrial flutter ablation with Dr. Gibson Sampson on 03/06/2018.  4.  History of mild intermittent asthma.  5.  Hypertension.  6.  Obstructive sleep apnea.  7.  Hyperlipidemia.    PLAN:  The patient will be discharged home.  He will report to the emergency   room for any of the following:  Increasing chest pain, increasing shortness of   breath, focal neurological changes, temperature of 101 or greater, or   hemoptysis.  He will call our office if he has recurrence of atrial   fibrillation with duration of greater than 2 hours.  He will observe the   catheter insertion sites and report certainly to the ER for any drainage,   redness, or swelling.  Patient will be seen in followup in our office in   approximately 2 weeks.       ____________________________________     WALTER Gomez / MEHRDAD    DD:  03/07/2018 08:59:39  DT:  03/07/2018 10:20:46    D#:  [id number]  Job#:  [id number]    cc: Gibson Sampson MD

## 2018-03-07 NOTE — PROGRESS NOTES
2 RN skin check with SUMMER Hemphill. RIJ, R radial, and bilateral femoral sites, CDI, no s/s of hematoma or bleeding CMS intact. Otherwise skin intact.

## 2018-03-07 NOTE — PROGRESS NOTES
Assumed care of pt, call light w/in reach, and fall prec in stepan ce.  Bed locked in lowest position. Pt instructed to call for assistance before getting out of bed.  All questions answered, no needs at this time. 4 sites are soft, no bleeding present.  Pt assisted to BR - denies dizziness and pain.

## 2018-03-07 NOTE — PROGRESS NOTES
Assumed care at 0700, bedside report received from night shift at RN. Pt is AO x4 with no signs of distress. Pt is SR 67 on the monitor. Initial assessment completed, orders reviewed, call light within reach, and hourly rounding in place. POC addressed with patient, no additional questions at this time.

## 2018-03-07 NOTE — DISCHARGE PLANNING
[facility] faxed pt's prescription, Xarelto, to pt's preferred pharmacy [facility] on [address].      Plan:  [facility] will f/u on co-pay.

## 2018-03-07 NOTE — PROCEDURES
DATE OF SERVICE:  03/06/2018    INDICATION FOR PROCEDURE:  Atrial fibrillation and atrial flutter.    PROCEDURE COMPONENTS:  1.  EP study and ablation of atrial fibrillation.  2.  Ablation of additional mechanism of arrhythmia for typical atrial flutter.  3.  Guidance with 3-dimensional mapping.  4.  Guidance with intracardiac echocardiography.  5.  Programmed stimulation after drug infusion.    PROCEDURE IN DETAIL:  Patient presented to the cath lab in sinus rhythm.  He   was placed under general anesthesia and had esophageal temperature probe   placed for temperature monitoring and arterial blood pressure line placed and   then he was prepped and draped in the usual sterile fashion.  We began by   getting access in the right femoral vein using modified Seldinger technique   with ultrasound guidance and I placed two 8-Cambodian sheaths.  In the left   femoral vein, I placed an 8-Cambodian sheath, and in the right internal jugular   vein, I placed a 7-Cambodian sheath using micropuncture and still ultrasound   guidance.  The 7-Cambodian sheath was used for a duodecapolar coronary sinus   catheter within the right atrium from the left atrium for sensing and pacing.    I then placed an 8-Cambodian intracardiac echocardiography catheter on the left   vein and helped to perform our 3-dimensional map as well as guiding the   transseptal catheterization.  The transseptals were performed by upsizing the   8-Cambodian sheaths to 8-1/2-Cambodian Preface sheaths and a Rosmery needle was   used.  As soon as the first transseptal was across, patient was anticoagulated   with weight-based heparin bolus and drip to try to get an ACT above 300.    Once both transseptals were across, the first had a mapping and ablation   catheter with force contact sensing irrigated tip manufactured by [employer] and   the second one had a PentaRay mapping catheter.  I refined the 3-dimensional   map with the catheters and then performed wide area circumferential ablation    around the left veins and then around the right veins, developed bidirectional   block.  When ablating each side of veins, I saw the entrance block and then   performed pacing and confirmed exit block.  I then moved to the other vein in   the pair each time and confirmed entrance and exit block as well.  With   entrance and exit block at the veins, I then turned my attention to the right   side.  I brought my ablator back to the right and performed cavotricuspid   isthmus ablation.  I had a difficult time reaching the tricuspid valve, so I   exchanged for an SAFL sheath and performed linear ablation back to the IVC.    With this linear ablation, the patient developed bidirectional block across   the cavotricuspid isthmus.  I did perform an electrophysiologic study while   waiting to make sure there was no resumption of conduction.  At the outset and   after ablation, the patient remained in sinus rhythm.  His AH was 97.  His HV   was 42.  His FL was 183.  QRS duration was 92.  All normal intervals.  AV   Wenckebach occurred at 400 milliseconds.  AV Wenckebach occurred at 240 when   we were on high-dose isoproterenol.  AV denisse refractory period was 360,   pacing at 600.  Atrial ERP was 240, pacing at 600.  We did a full 10 minutes   high-dose isoproterenol and patient did not really have any ectopy.  He   remained noninducible.  During our ablation, we had delivered 6 radiofrequency   lesions for a total time of 1513 seconds.  We used fluoroscopy for 2.6   minutes.  After 20 minutes, we had rechecked the flutter line as well as   rechecking the veins and everything was still isolated and blocked, so at this   point, catheters were removed.  Heparin was stopped and reversed with   protamine.  Hemostasis was obtained with manual pressure.    CONCLUSIONS:  1.  Presentation in normal sinus rhythm with normal conduction intervals.  2.  Successful pulmonary vein isolation.  3.  Successful cavotricuspid isthmus ablation  with bidirectional block.  4.  No inducible tachycardia.  5.  Normal anatomy seen with our intracardiac echocardiography and   3-dimensional mapping that were both used to decrease the use of fluoroscopy   down to 2.6 minutes for this case.    DISCUSSION:  Patient did very well with the procedure.  He will need to take   oral anticoagulation for the next 3 months.  He will be started on Xarelto.    We will try to change his antiarrhythmic to only as needed.  He will take   esophageal protection medicines.  He will be monitored overnight.       ____________________________________     MD RUPAL Moody / NTS    DD:  03/06/2018 17:42:08  DT:  03/06/2018 20:17:21    D#:  [id number]  Job#:  [id number]

## 2018-03-07 NOTE — DISCHARGE INSTRUCTIONS
Discharge Instructions    Discharged to home by car with relative. Discharged via wheelchair, hospital escort: Yes.  Special equipment needed: Not Applicable    Be sure to schedule a follow-up appointment with your primary care doctor or any specialists as instructed.     Discharge Plan:   Diet Plan: Discussed  Activity Level: Discussed  Confirmed Follow up Appointment: Appointment Scheduled  Confirmed Symptoms Management: Discussed  Medication Reconciliation Updated: Yes  Influenza Vaccine Indication: Not indicated: Previously immunized this influenza season and > 8 years of age    I understand that a diet low in cholesterol, fat, and sodium is recommended for good health. Unless I have been given specific instructions below for another diet, I accept this instruction as my diet prescription.   Other diet: As tolerated. We recommend less than 2 grams of sodium per day.     Special Instructions: None    · Is patient discharged on Warfarin / Coumadin?   No     Depression / Suicide Risk    As you are discharged from this [facility] facility, it is important to learn how to keep safe from harming yourself.    Recognize the warning signs:  · Abrupt changes in personality, positive or negative- including increase in energy   · Giving away possessions  · Change in eating patterns- significant weight changes-  positive or negative  · Change in sleeping patterns- unable to sleep or sleeping all the time   · Unwillingness or inability to communicate  · Depression  · Unusual sadness, discouragement and loneliness  · Talk of wanting to die  · Neglect of personal appearance   · Rebelliousness- reckless behavior  · Withdrawal from people/activities they love  · Confusion- inability to concentrate     If you or a loved one observes any of these behaviors or has concerns about self-harm, here's what you can do:  · Talk about it- your feelings and reasons for harming yourself  · Remove any means that you might use to hurt  yourself (examples: pills, rope, extension cords, firearm)  · Get professional help from the community (Mental Health, Substance Abuse, psychological counseling)  · Do not be alone:Call your Safe Contact- someone whom you trust who will be there for you.  · Call your local CRISIS HOTLINE 760-7770 or 941-852-7565  · Call your local Children's Mobile Crisis Response Team [place] (088) 513-7242 or www.BlueWare  · Call the toll free National Suicide Prevention Hotlines   · National Suicide Prevention Lifeline 300-612-NTNO (5150)  · [employer] Network 800-SUICIDE (094-7208)    Cardiac Ablation  Cardiac ablation is a procedure to stop some heart tissue from causing problems. The heart has many electrical connections. Sometimes these connections cause the heart to beat very fast or irregularly. Removing some of the problem areas can improve heart rhythm or make it normal. Ablation is done for people who:  · Have Elin-Parkinson-White syndrome.  · Have other fast heart rhythms (tachycardia).  · Have taken medicines for an abnormal heart rhythm (arrhythmia) and the medicines had:  ¨ No success.  ¨ Side effects.  · May have a type of heartbeat that could cause death.  What happens before the procedure?  · Follow instructions from your doctor about eating and drinking before the procedure.  · Take your medicines as told by your doctor. Take them at regular times with water unless told differently by your doctor.  · If you are taking diabetes medicine, ask your doctor how to take it. Ask if there are any special instructions you should follow. Your doctor may change how much insulin you take the day of the procedure.  What happens during the procedure?  · A special type of X-ray will be used. The X-ray helps your doctor see images of your heart during the procedure.  · A small cut (incision) will be made in your neck or groin.  · An IV tube will be started before the procedure begins.  · You will be given  a numbing medicine (anesthetic) or a medicine to help you relax (sedative).  · The skin on your neck or groin will be numbed.  · A needle will be put into a large vein in your neck or groin.  · A thin, flexible tube (catheter) will be put in to reach your heart.  · A dye will be put in the tube. The dye will show up on X-rays. It will help your doctor see the area of the heart that needs treatment.  · When the heart tissue that is causing problems is found, the tip of the tube will send an electrical current to it. This will stop it from causing problems.  · The tube will be taken out.  · Pressure will be put on the area where the tube was. This will keep it from bleeding. A bandage will be placed over the area.  What happens after the procedure?  · You will be taken to a recovery area. Your blood pressure, heart rate, and breathing will be watched. The area where the tube was will also be watched for bleeding.  · You will need to lie still for 4-6 hours. This keeps the area where the tube was from bleeding.  This information is not intended to replace advice given to you by your health care provider. Make sure you discuss any questions you have with your health care provider.  Document Released: 08/20/2014 Document Revised: 05/25/2017 Document Reviewed: 05/15/2014  [employer] Interactive Patient Education © 2017 [employer]    Ablación cardíaca  (Cardiac Ablation)  La ablación cardíaca es un procedimiento que se realiza para evitar que mazin parte del tejido cardíaco ocasione problemas. El corazón tiene diferentes conexiones eléctricas. En algunos casos, estas conexiones hacen que el corazón palpite muy rápido o de manera irregular. Al extirpar alguna de las zonas problemáticas, puede mejorarse o normalizarse el ritmo cardíaco. La ablación se indica en aquellas personas que:  · Sufren el síndrome de Elin-Parkinson-White.  · Tienen otros ritmos cardíacos rápidos (taquicardia).  · [place] tomado medicamentos para un ritmo  cardíaco anormal (arritmia) y los medicamentos:  ¨ No tuvieron éxito.  ¨ Provocaron efectos secundarios.  · Puede latisha sufrido un tipo de latido cardíaco que podría causar la muerte.  ANTES DEL PROCEDIMIENTO  · Siga las instrucciones de baron médico acerca de comer y beber antes del procedimiento.  · [place] los medicamentos paul le indicó el médico. Tómelos a intervalos regulares con agua, excepto que baron médico le haya indicado otra cosa.  · Si recibe medicamentos para la diabetes, consulte a baron médico cómo debe utilizarlos. Consulte si hay instrucciones especiales que deba seguir. El médico podrá cambiar la cantidad de insulina que deberá recibir el día del procedimiento.  PROCEDIMIENTO  · Se utilizará un tipo especial de radiografías. Las radiografías permiten al médico merna imágenes del corazón antoni el procedimiento.  · Le realizarán un pequeño joe (incisión) en el alayna o en la karlee.  · Antes de comenzar el procedimiento, le colocarán mazin vía intravenosa (IV).  · Le darán un medicamento que lo hará dormir (anestésico) o un medicamento que lo ayudará a relajarse (sedante).  · Le adormecerán la piel del alayna o de la karlee.  · Se insertará mazin aguja en mazin vena elizabeth del alayna o de la karlee.  · Le colocarán un tubo flexible (catéter) para que llegue hasta el corazón.  · Le inyectarán mazin sustancia de contraste por el tubo. La sustancia de contraste aparecerá en las radiografías. [place] permitirá al médico merna la piper del corazón que necesita tratamiento.  · Cuando se encuentra el tejido cardíaco que causa los síntomas, la punta del tubo enviará corriente eléctrica a kathy piper. [place] hará que no tenga más problemas.  · Luego el tubo se retira.  · Aplicarán presión sobre la piper en la que estuvo el tubo. [place] se realiza para evitar el sangrado. Le colocarán mazin venda sobre la piper.  DESPUÉS DEL PROCEDIMIENTO  · Lo trasladarán mazin erma de recuperación. Le controlarán la presión arterial, la frecuencia cardíaca y  respiratoria. Controlarán la piper de inserción del tubo para observar si hay sangrado.  · Deberá permanecer acostado y quieto antoni 4 - 6 horas. [place] impide que adi en la piper de inserción del tubo.  Esta información no tiene paul fin reemplazar el consejo del médico. Asegúrese de hacerle al médico cualquier pregunta que tenga.  Document Released: 08/20/2014 Document Revised: 01/08/2016 Document Reviewed: 05/15/2014  [employer] Interactive Patient Education © 2017 [employer]vier Inc.    Rivaroxaban oral tablets  What is this medicine?  RIVAROXABAN (ri va DHRUV a ban) is an anticoagulant (blood thinner). It is used to treat blood clots in the lungs or in the veins. It is also used after knee or hip surgeries to prevent blood clots. It is also used to lower the chance of stroke in people with a medical condition called atrial fibrillation.  This medicine may be used for other purposes; ask your health care provider or pharmacist if you have questions.  COMMON BRAND NAME(S): Xarelto, Xarelto Starter Pack  What should I tell my health care provider before I take this medicine?  They need to know if you have any of these conditions:  -bleeding disorders  -bleeding in the brain  -blood in your stools (black or tarry stools) or if you have blood in your vomit  -history of stomach bleeding  -kidney disease  -liver disease  -low blood counts, like low white cell, platelet, or red cell counts  -recent or planned spinal or epidural procedure  -take medicines that treat or prevent blood clots  -an unusual or allergic reaction to rivaroxaban, other medicines, foods, dyes, or preservatives  -pregnant or trying to get pregnant  -breast-feeding  How should I use this medicine?  Take this medicine by mouth with a glass of water. Follow the directions on the prescription label. Take your medicine at regular intervals. Do not take it more often than directed. Do not stop taking except on your doctor's advice. Stopping this medicine may  increase your risk of a blood clot. Be sure to refill your prescription before you run out of medicine.  If you are taking this medicine after hip or knee replacement surgery, take it with or without food. If you are taking this medicine for atrial fibrillation, take it with your evening meal. If you are taking this medicine to treat blood clots, take it with food at the same time each day. If you are unable to swallow your tablet, you may crush the tablet and mix it in applesauce. Then, immediately eat the applesauce. You should eat more food right after you eat the applesauce containing the crushed tablet.  Talk to your pediatrician regarding the use of this medicine in children. Special care may be needed.  Overdosage: If you think you have taken too much of this medicine contact a poison control center or emergency room at once.  NOTE: This medicine is only for you. Do not share this medicine with others.  What if I miss a dose?  If you take your medicine once a day and miss a dose, take the missed dose as soon as you remember. If you take your medicine twice a day and miss a dose, take the missed dose immediately. In this instance, 2 tablets may be taken at the same time. The next day you should take 1 tablet twice a day as directed.  What may interact with this medicine?  Do not take this medicine with any of the following medications:  -defibrotide  This medicine may also interact with the following medications:  -aspirin and aspirin-like medicines  -certain antibiotics like erythromycin, azithromycin, and clarithromycin  -certain medicines for fungal infections like ketoconazole and itraconazole  -certain medicines for irregular heart beat like amiodarone, quinidine, dronedarone  -certain medicines for seizures like carbamazepine, phenytoin  -certain medicines that treat or prevent blood clots like warfarin, enoxaparin, and dalteparin  -conivaptan  -diltiazem  -felodipine  -indinavir  -lopinavir;  ritonavir  -NSAIDS, medicines for pain and inflammation, like ibuprofen or naproxen  -ranolazine  -rifampin  -ritonavir  -SNRIs, medicines for depression, like desvenlafaxine, duloxetine, levomilnacipran, venlafaxine  -SSRIs, medicines for depression, like citalopram, escitalopram, fluoxetine, fluvoxamine, paroxetine, sertraline  -Amelia's wort  -verapamil  This list may not describe all possible interactions. Give your health care provider a list of all the medicines, herbs, non-prescription drugs, or dietary supplements you use. Also tell them if you smoke, drink alcohol, or use illegal drugs. Some items may interact with your medicine.  What should I watch for while using this medicine?  Visit your doctor or health care professional for regular checks on your progress.  Notify your doctor or health care professional and seek emergency treatment if you develop breathing problems; changes in vision; chest pain; severe, sudden headache; pain, swelling, warmth in the leg; trouble speaking; sudden numbness or weakness of the face, arm or leg. These can be signs that your condition has gotten worse.  If you are going to have surgery or other procedure, tell your doctor that you are taking this medicine.  What side effects may I notice from receiving this medicine?  Side effects that you should report to your doctor or health care professional as soon as possible:  -allergic reactions like skin rash, itching or hives, swelling of the face, lips, or tongue  -back pain  -redness, blistering, peeling or loosening of the skin, including inside the mouth  -signs and symptoms of bleeding such as bloody or black, tarry stools; red or dark-brown urine; spitting up blood or brown material that looks like coffee grounds; red spots on the skin; unusual bruising or bleeding from the eye, gums, or nose  Side effects that usually do not require medical attention (report to your doctor or health care professional if they continue or  are bothersome):  -dizziness  -muscle pain  This list may not describe all possible side effects. Call your doctor for medical advice about side effects. You may report side effects to FDA at 9-816-FDA-4500.  Where should I keep my medicine?  Keep out of the reach of children.  Store at room temperature between 15 and 30 degrees C (59 and 86 degrees F). Throw away any unused medicine after the expiration date.  NOTE: This sheet is a summary. It may not cover all possible information. If you have questions about this medicine, talk to your doctor, pharmacist, or health care provider.  © 2018 [employer]/Gold Standard (2017-09-06 16:29:33)    Rivaroxaban oral tablets  ¿Qué es heaven medicamento?  El RIVAROXABÁN es un anticoagulante (diluyente de la adi). Se utiliza para tratar coágulos sanguíneos en los pulmones o venas. Se utiliza también después de mazin operación de rodilla o cadera para evitar coágulos sanguíneos. Se utiliza también para reducir la posibilidad de derrame cerebral en los pacientes con mazin afección médica llamada fibrilación auricular.  Heaven medicamento puede ser utilizado para otros usos; si tiene alguna pregunta consulte con baron proveedor de atención médica o con baron farmacéutico.  MARCAS COMUNES: Xarelto, Xarelto Starter Pack  ¿Qué le adrian informar a mi profesional de la theresa antes de jamal heaven medicamento?  Necesita saber si usted presenta alguno de los siguientes problemas o situaciones:  trastornos de sangrado sangrado en el cerebro adi en las heces (heces de color oscuro o de aspecto alquitranado) o si vomita adi antecedentes de sangrado estomacal enfermedad renal enfermedad hepática conteos sanguíneos bajos, paul baja cantidad de glóbulos blancos, glóbulos rojos y plaquetas procedimiento epidural o vertebral reciente o programado coretta medicamentos que tratan o previenen coágulos sanguíneos mazin reacción alérgica o inusual al rivaroxabán, otros medicamentos, alimentos, colorantes o  conservantes si está embarazada o buscando quedar embarazada si está amamantando a un bebé  ¿Cómo adrian utilizar heaven medicamento?  [place] heaven medicamento por vía oral con un vaso de agua. Siga las instrucciones de la etiqueta del medicamento. [place] baron medicamento a intervalos regulares. No tome baron medicamento con mazin frecuencia mayor a la indicada. No deje de tomarlo excepto si así lo indica baron médico. El dejar de jamal heaven medicamento puede aumentar baron riesgo de coágulos sanguíneos. Asegurarse de rellenar baron receta antes de quedarse sin medicamento.  Si está tomando heaven medicamento después de mazin cirugía de reemplazo de cadera o rodilla, tómelo con o sin alimentos. Si está tomando heaven medicamento para fibrilación auricular, tómelo con la jonathan (la última comida del día). Si está tomando heaven medicamento para tratar coágulos sanguíneos, tómelo con alimentos a la misma hora cada día. Si no puede tragar la tableta, puede triturar la tableta y agregarla con puré de manzana. Luego, come el puré de manzana enseguida. Debe comer mas comida después de comer el puré de manzana con la tableta triturada.  Hable con baron pediatra para informarse acerca del uso de heaven medicamento en niños. Puede requerir atención especial.  Sobredosis: Póngase en contacto inmediatamente con un centro toxicológico o mazin erma de urgencia si usted librado que haya tomado demasiado medicamento.  ATENCIÓN: Heaven medicamento es solo para usted. No comparta heaven medicamento con nadie.  ¿Qué sucede si me olvido de mazin dosis?  Si coretta baron medicamento mazin vez al día y se olvida mazin dosis, tome la dosis olvidada tan pronto paul se acuerde. Si coretta baron medicamento dos veces al día y se olvida mazin dosis, tome la dosis olvidada inmediatamente. En heaven stephanie, puede tomarse 2 tabletas al mismo tiempo. Al día siguiente debe jamal 1 tableta dos veces al día paul indicado.  ¿Qué puede interactuar con heaven medicamento?  aspirina o medicamentos tipo aspirina ciertos  antibióticos tales paul eritromicina, azitromicina y claritromicina ciertos medicamentos para infecciones micóticas tales paul quetoconazol e itraconazol ciertos medicamentos para pulso cardiaco irregular tales paul amiodarona, quinidina, dronedarona ciertos medicamentos para convulsiones tales paul carbamazepina, fenitoína conivaptán diltiazem felodipino indinavir lopinavir; ritonavir los FRANCES, medicamentos para el dolor o inflamación, paul ibuprofeno o naproxeno ranolazina rifampicina ritonavir hierba de [place] verapamilo  Puede ser que esta lista no menciona todas las posibles interacciones. Informe a baron profesional de la theresa de todos los productos a base de hierbas, medicamentos de venta librado o suplementos nutritivos que esté tomando. Si usted fuma, consume bebidas alcohólicas o si utiliza drogas ilegales, indíqueselo también a baron profesional de la theresa. Algunas sustancias pueden interactuar con baron medicamento.  ¿A qué adrian estar atento al usar heaven medicamento?  Visite a baron médico o a baron profesional de la theresa para revisar regularmente baron evolución.  Notifique a baron médico o a baron profesional de la theresa y busque tratamiento médico de emergencia si desarrolla problemas respiratorios; cambios en la visión; dolor en el pecho; dolor de jordyn repentino e intenso; dolor, hinchazón, calor en la pierna; problemas para hablar; entumecimiento o debilidad repentina en el layla, el brazo o la pierna. Estos pueden ser signos de que baron afección ha empeorado.  Si va a someterse a mazin operación u otro procedimiento, informe a baron médico que está tomando heaven medicamento.  ¿Qué efectos secundarios puedo tener al utilizar heaven medicamento?  Efectos secundarios que debe informar a baron médico o a baron profesional de la theresa tan pronto paul sea posible:  reacciones alérgicas, paul erupción cutánea, picazón o urticarias, e hinchazón de la marc, los labios o la lengua dolor de espalda enrojecimiento, formación de ampollas,  descamación o distensión de la piel, incluso dentro de la boca signos y síntomas de sangrado, tales paul heces con adi o de color lynda y aspecto alquitranado; orina de color pina o marrón oscuro; escupir adi o material marrón que tiene el aspecto de granos de café molido; manchas viveros en la piel; sangrado o moretones inusuales en los ojos, las encías o la nariz  Efectos secundarios que generalmente no requieren atención médica (infórmelos a baron médico o a baron profesional de la theresa si persisten o si son molestos):  mareos dolor muscular  Puede ser que esta lista no menciona todos los posibles efectos secundarios. Comuníquese a baron médico por asesoramiento médico sobre los efectos secundarios. Usted puede informar los efectos secundarios a la FDA por teléfono al [phone].  ¿Dónde adrian guardar mi medicina?  [place] fuera del alcance de los niños.  [place] a temperatura ambiente, entre 15 y 30 grados C (59 y 86 grados F). Deseche todo el medicamento que no haya utilizado, después de la fecha de vencimiento.  ATENCIÓN: Ana folleto es un resumen. Puede ser que no cubra toda la posible información. Si usted tiene preguntas acerca de esta medicina, consulte con baron médico, baron farmacéutico o baron profesional de la theresa.  © 2018 [employer]vier/Gold Standard (2017-08-28 00:00:00)    Sucralfate tablets  What is this medicine?  SUCRALFATE (NEERAJ dom fate) helps to treat ulcers of the intestine.  This medicine may be used for other purposes; ask your health care provider or pharmacist if you have questions.  COMMON BRAND NAME(S): Carafate  What should I tell my health care provider before I take this medicine?  They need to know if you have any of these conditions:  -kidney disease  -an unusual or allergic reaction to sucralfate, other medicines, foods, dyes, or preservatives  -pregnant or trying to get pregnant  -breast-feeding  How should I use this medicine?  Take this medicine by mouth with a glass of water. Follow  the directions on the prescription label. This medicine works best if you take it on an empty stomach, 1 hour before meals. Take your doses at regular intervals. Do not take your medicine more often than directed. Do not stop taking except on your doctor's advice.  Talk to your pediatrician regarding the use of this medicine in children. Special care may be needed.  Overdosage: If you think you have taken too much of this medicine contact a poison control center or emergency room at once.  NOTE: This medicine is only for you. Do not share this medicine with others.  What if I miss a dose?  If you miss a dose, take it as soon as you can. If it is almost time for your next dose, take only that dose. Do not take double or extra doses.  What may interact with this medicine?  -antacid  -cimetidine  -digoxin  -ketoconazole  -phenytoin  -quinidine  -ranitidine  -some antibiotics like ciprofloxacin, norfloxacin, and ofloxacin  -theophylline  -thyroid hormones  -warfarin  This list may not describe all possible interactions. Give your health care provider a list of all the medicines, herbs, non-prescription drugs, or dietary supplements you use. Also tell them if you smoke, drink alcohol, or use illegal drugs. Some items may interact with your medicine.  What should I watch for while using this medicine?  Visit your doctor or health care professional for regular check ups. Let your doctor know if your symptoms do not improve or if you feel worse.  Antacids should not be taken within one half hour before or after this medicine.  What side effects may I notice from receiving this medicine?  Side effects that you should report to your doctor or health care professional as soon as possible:  -allergic reactions like skin rash, itching or hives, swelling of the face, lips, or tongue  -difficulty breathing  Side effects that usually do not require medical attention (report to your doctor or health care professional if they  continue or are bothersome):  -back pain  -constipation  -drowsy, dizzy  -dry mouth  -headache  -stomach upset, gas  -trouble sleeping  This list may not describe all possible side effects. Call your doctor for medical advice about side effects. You may report side effects to FDA at [id number].  Where should I keep my medicine?  Keep out of the reach of children.  Store at room temperature between 15 and 30 degrees C (59 and 86 degrees F). Keep container tightly closed. Throw away any unused medicine after the expiration date.  NOTE: This sheet is a summary. It may not cover all possible information. If you have questions about this medicine, talk to your doctor, pharmacist, or health care provider.  © 2018 [employer]/Gold Standard (2009-08-19 15:46:20)    Sucralfate tablets  ¿Qué es heaven medicamento?  El SUCRALFATO ayuda a tratar úlceras intestinales.  Heaven medicamento puede ser utilizado para otros usos; si tiene alguna pregunta consulte con baron proveedor de atención médica o con baron farmacéutico.  MARCAS COMUNES: Carafate  ¿Qué le adrian informar a mi profesional de la theresa antes de jamal heaven medicamento?  Necesita saber si usted presenta alguno de los siguientes problemas o situaciones:  -enfermedad renal  -mazin reacción alérgica o inusual al sucralfato, a otros medicamentos, alimentos, colorantes o conservantes  -si está embarazada o buscando quedar embarazada  -si está amamantando a un bebé  ¿Cómo adrian utilizar heaven medicamento?  [place] heaven medicamento por vía oral con un vaso de agua. Siga las instrucciones de la etiqueta del medicamento. Heaven medicamento actúa mejor si se coretta con el estómago vacío, generalmente 1 hora antes de las comidas. [place] lia dosis a intervalos regulares. No tome baron medicamento con mazin frecuencia mayor a la indicada. No deje de tomarlo excepto si así lo indica baron médico.  Hable con baron pediatra para informarse acerca del uso de heaven medicamento en niños. Puede requerir atención  especial.  Sobredosis: Póngase en contacto inmediatamente con un centro toxicológico o mazin erma de urgencia si usted librado que haya tomado demasiado medicamento.  ATENCIÓN: Heaven medicamento es solo para usted. No comparta heaven medicamento con nadie.  ¿Qué sucede si me olvido de mazin dosis?  Si olvida mazin dosis, tómela lo antes posible. Si es esdras la hora de la próxima dosis, tome sólo kathy dosis. No tome dosis adicionales o dobles.  ¿Qué puede interactuar con heaven medicamento?  -antiácidos  -cimetidina  -digoxina  -quetoconazol  -fenitoína  -quinidina  -ranitidina  -algunos antibióticos, tales paul ciprofloxacina, norfloxacino y ofloxacino  -teofilina  -hormonas tiroideas  -warfarina  Puede ser que esta lista no menciona todas las posibles interacciones. Informe a baron profesional de la theresa de todos los productos a base de hierbas, medicamentos de venta librado o suplementos nutritivos que esté tomando. Si usted fuma, consume bebidas alcohólicas o si utiliza drogas ilegales, indíqueselo también a baron profesional de la theresa. Algunas sustancias pueden interactuar con baron medicamento.  ¿A qué adrian estar atento al usar heaven medicamento?  Visite a baron médico o baron profesional de la theresa para chequear baron evolución periódicamente. Consulte a baron médico si lia síntomas no mejoran o si se siente peor.  No debe jamal antiácidos dentro de media hora antes o después de jamal heaven medicamento.  ¿Qué efectos secundarios puedo tener al utilizar heaven medicamento?  Efectos secundarios que debe informar a baron médico o a baron profesional de la theresa tan pronto paul sea posible:  -reacciones alérgicas paul erupción cutánea, picazón o urticarias, hinchazón de la marc, labios o lengua  -dificultad al respirar  Efectos secundarios que, por lo general, no requieren atención médica (debe informarlos a baron médico o a baron profesional de la theresa si persisten o si son molestos):  -dolor de espalda  -estreñimiento  -somnolencia o mareos  -boca seca  -dolor  de jordyn  -malestar estomacal, gases  -dificultad para conciliar el sueño  Puede ser que esta lista no menciona todos los posibles efectos secundarios. Comuníquese a baron médico por asesoramiento médico sobre los efectos secundarios. Usted puede informar los efectos secundarios a la FDA por teléfono al [facility].  ¿Dónde adrian guardar mi medicina?  Manténgala fuera del alcance de los niños.  Guárdela a temperatura ambiente, entre 15 y 30 grados C (59 y 86 grados F). Mantenga el envase yanira cerrado. Deseche los medicamentos que no haya utilizado, después de la fecha de vencimiento.  ATENCIÓN: Ana folleto es un resumen. Puede ser que no cubra toda la posible información. Si usted tiene preguntas acerca de esta medicina, consulte con baron médico, baron farmacéutico o baron profesional de la theresa.  © 2018 [employer]/Gold Standard (2016-02-09 00:00:00)

## 2018-03-07 NOTE — PROGRESS NOTES
Pt was dc'd home via car with relative. DC paperwork was discussed and signed by the pt. Pt prescriptions given. LDA's were removed, tele monitor was removed, and pt was escorted out.

## 2018-03-14 ENCOUNTER — OFFICE VISIT (OUTPATIENT)
Dept: CARDIOLOGY | Facility: MEDICAL CENTER | Age: 63
End: 2018-03-14
Payer: COMMERCIAL

## 2018-03-14 VITALS
HEART RATE: 60 BPM | SYSTOLIC BLOOD PRESSURE: 110 MMHG | BODY MASS INDEX: 40.3 KG/M2 | DIASTOLIC BLOOD PRESSURE: 80 MMHG | WEIGHT: 219 LBS | OXYGEN SATURATION: 96 % | HEIGHT: 62 IN

## 2018-03-14 DIAGNOSIS — I48.0 PAF (PAROXYSMAL ATRIAL FIBRILLATION) (HCC): ICD-10-CM

## 2018-03-14 DIAGNOSIS — G47.33 OSA (OBSTRUCTIVE SLEEP APNEA): ICD-10-CM

## 2018-03-14 DIAGNOSIS — I10 ESSENTIAL HYPERTENSION: ICD-10-CM

## 2018-03-14 LAB — EKG IMPRESSION: NORMAL

## 2018-03-14 PROCEDURE — 93000 ELECTROCARDIOGRAM COMPLETE: CPT | Performed by: NURSE PRACTITIONER

## 2018-03-14 PROCEDURE — 99214 OFFICE O/P EST MOD 30 MIN: CPT | Performed by: NURSE PRACTITIONER

## 2018-03-14 ASSESSMENT — ENCOUNTER SYMPTOMS
ORTHOPNEA: 0
PALPITATIONS: 0
COUGH: 1
FOCAL WEAKNESS: 0
DIZZINESS: 0
DOUBLE VISION: 0
SHORTNESS OF BREATH: 0
WHEEZING: 0
HEADACHES: 0
WEAKNESS: 0
BLURRED VISION: 0
FALLS: 0
LOSS OF CONSCIOUSNESS: 0

## 2018-03-14 NOTE — PROGRESS NOTES
Subjective:   Vernon Sims is a 62 y.o. male who presents today for hospital follow up after ablation from recurrent paroxysmal atrial fibrillation and atrial flutter.    She is patient of Dr. Sampson and Dr. Ryan in our clinic, HX: Hypertension, paroxysmal atrial fibrillation despite rythmol therapy, and sleep apnea on cpap.     Patient underwent successful pulmonary vein isolation and cavo tricuspid isthmus ablation with bidirectional block with Dr. Sampson on 3/6/2018. Patient tolerated well, discharged home. He will be on oral anticoagulation for 3 months (xarelto) and esophageal protection medicines.     Patient is doing well, he complains some soreness at the femoral site. No complaint of dizziness or palpitations. Some mild cough in the evening. Walked about a mile yesterday and felt great.     He has had no episodes of chest pain, dizziness/lightheadedness, shortness of breath, orthopnea, or peripheral edema.      Past Medical History:   Diagnosis Date   • Arrhythmia 2013    A Flutter   • Arthritis     all joints (osteoarthritis)   • ASTHMA     inhalers   • Atrial fibrillation (CMS-[facility])    • Cataract 03/05/2018    Bilateral - no surgery   • Cholesterol blood decreased    • Enlarged prostate    • Heart burn    • Hiatus hernia syndrome    • High cholesterol    • HTN (hypertension) 8/19/2013   • Hypercholesteremia    • PAF (paroxysmal atrial fibrillation) (CMS-[facility]) 8/19/2013   • Pain 2/10/14    10/10 right shoulder   • Pneumonia 90's   • Preop cardiovascular exam 8/19/2013   • Sleep apnea     cpap    • Snoring      Past Surgical History:   Procedure Laterality Date   • PTERYGIUM EXCISION Right 3/8/2016    Procedure: PTERYGIUM EXCISION;  Surgeon: Homer Parsons M.D.;  Location: SURGERY [facility]S;  Service:    • PTERYGIUM EXCISION Left 11/11/2015    Procedure: PTERYGIUM EXCISION W/AMNIOGRAFT AND MITOMYCIN;  Surgeon: Homer Parsons M.D.;  Location: SURGERY [facility];  Service:     • KNEE MANIPULATION Right 6/29/2015    Procedure: KNEE MANIPULATION;  Surgeon: Dave Knox M.D.;  Location: SURGERY SAME DAY [facility];  Service:    • KNEE ARTHROPLASTY TOTAL Right 5/11/2015    Procedure: KNEE ARTHROPLASTY TOTAL;  Surgeon: Dave Knox M.D.;  Location: SURGERY [facility];  Service:    • FULL THICKNESS BLOCK RESECTION  4/30/2014    Performed by Isacc Ramírez M.D. at SURGERY [facility]   • BIOPSY GENERAL  4/30/2014    left eye lid cyst removal   • SHOULDER ARTHROSCOPY W/ ROTATOR CUFF REPAIR  2/17/2014    Performed by Dave Knox M.D. at SURGERY [facility] ORS   • KNEE UNICOMPARTMENTAL  10/21/2013    Performed by Dave Knox M.D. at SURGERY [facility]S   • KNEE ARTHROSCOPY  8/14/08    Performed by ANDERSON PLASCENCIA at SURGERY SAME DAY [facility] ORS   • MEDIAL MENISCECTOMY  8/14/08    Performed by ANDERSON PLASCENCIA at SURGERY SAME DAY [facility] ORS   • OTHER ORTHOPEDIC SURGERY  1987    right knee     Family History   Problem Relation Age of Onset   • Heart Disease Father    • Diabetes Father    • Heart Attack Mother 77   • Heart Disease Sister      surgery at age 10   • Stroke       History   Smoking Status   • Former Smoker   • Packs/day: 1.00   • Years: 13.00   • Types: Cigarettes   • Start date: 1/1/1967   • Quit date: 1/1/1980   Smokeless Tobacco   • Never Used     No Known Allergies  Outpatient Encounter Prescriptions as of 3/14/2018   Medication Sig Dispense Refill   • rivaroxaban (XARELTO) 20 MG Tab tablet Take 1 Tab by mouth with dinner. 90 Tab 3   • sucralfate (CARAFATE) 1 GM Tab Take 1 Tab by mouth 4 Times a Day,Before Meals and at Bedtime. 120 Tab 0   • terbinafine (LAMISIL) 250 MG Tab Take 250 mg by mouth every day.     • metoprolol SR (TOPROL XL) 25 MG TABLET SR 24 HR Take 0.5 Tabs by mouth every bedtime. 30 Tab 1   • propafenone (RYTHMOL) 225 MG tablet TAKE 1 TABLET BY MOUTH EVERY 8 HOURS 270 Tab 2   • fenofibrate (TRICOR) 145 MG Tab Take  "145 mg by mouth every day.     • loratadine (CLARITIN) 10 MG Tab Take 10 mg by mouth every day.     • aspirin EC (ECOTRIN) 81 MG Tablet Delayed Response Take 81 mg by mouth every day.     • omeprazole (PRILOSEC) 20 MG delayed-release capsule Take 20 mg by mouth every bedtime.     • ranitidine (ZANTAC) 150 MG Tab Take 150 mg by mouth 2 times a day.     • tamsulosin (FLOMAX) 0.4 MG capsule Take 0.4 mg by mouth every bedtime.     • atorvastatin (LIPITOR) 20 MG TABS Take 20 mg by mouth every evening.       No facility-administered encounter medications on file as of 3/14/2018.      Review of Systems   Constitutional: Negative for malaise/fatigue.   Eyes: Negative for blurred vision and double vision.   Respiratory: Positive for cough (in the evening ). Negative for shortness of breath and wheezing.    Cardiovascular: Negative for chest pain, palpitations, orthopnea and leg swelling.   Musculoskeletal: Negative for falls.   Neurological: Negative for dizziness, focal weakness, loss of consciousness, weakness and headaches.   All other systems reviewed and are negative.       Objective:   /80   Pulse 60   Ht 1.575 m (5' 2\")   Wt 99.3 kg (219 lb)   SpO2 96%   BMI 40.06 kg/m²     Physical Exam   Constitutional: He is oriented to person, place, and time. He appears well-developed and well-nourished. No distress.   HENT:   Head: Normocephalic.   Neck: Normal range of motion. No JVD present.   Cardiovascular: Regular rhythm and normal heart sounds.  Bradycardia present.    No murmur heard.  Pulmonary/Chest: Effort normal and breath sounds normal. No respiratory distress. He has no wheezes.   Abdominal: Bowel sounds are normal.   Musculoskeletal: He exhibits no edema or tenderness.   Neurological: He is alert and oriented to person, place, and time.   Skin: Skin is warm and dry. He is not diaphoretic.   Psychiatric: His behavior is normal. Judgment normal.   Nursing note and vitals reviewed.      Myocardial " Perfusion  1/5/2018   Decreased photon activity is seen in the apical and mid anterior wall at rest   and stress. No associated wall motion abnormality. suspect artifact. however  small infarct cannot be ruled out.    No reversible defects that would indicate ischemia.  Normal left ventricular systolic function. The estimated ejection fraction is 59%  ECG INTERPRETATION   Negative stress ECG for ischemia.    Echocardiography Laboratory  1/5/2018  Mild concentric left ventricular hypertrophy.  Normal left ventricular systolic function.  Left ventricular ejection fraction is visually estimated to be 65%.  Normal diastolic function.  Aortic sclerosis without stenosis.      Assessment:     1. PAF (paroxysmal atrial fibrillation) (CMS-[facility])  EKG   2. SAUL (obstructive sleep apnea)     3. Essential hypertension         Medical Decision Making:  Today's Assessment / Status / Plan:     1. PAF:  - s/p pulmonary vein isolation and cavo tricuspid isthmus ablation with bidirectional block   - continue oral anticoagulation (xarelto) for at least 3 months   - on esophageal protection medicines ( Prilosec and carafate)  - EKG today shows Sinus bradycardia     2. SAUL:  - on CPAP    3. Hypertension:  - controlled   - continue metoprolol 25mg BID    4. Cough:  - monitor closely, most likely seasonal allergies   - breath sounds are clear throughout     Follow up in 3 months with Dr. Ryan, sooner as needed.     Collaborating Provider: Dr. Cole

## 2018-03-27 ENCOUNTER — OFFICE VISIT (OUTPATIENT)
Dept: MEDICAL GROUP | Facility: MEDICAL CENTER | Age: 63
End: 2018-03-27
Payer: COMMERCIAL

## 2018-03-27 VITALS
SYSTOLIC BLOOD PRESSURE: 136 MMHG | BODY MASS INDEX: 38.46 KG/M2 | TEMPERATURE: 98.3 F | HEART RATE: 59 BPM | OXYGEN SATURATION: 99 % | RESPIRATION RATE: 16 BRPM | WEIGHT: 209 LBS | DIASTOLIC BLOOD PRESSURE: 78 MMHG | HEIGHT: 62 IN

## 2018-03-27 DIAGNOSIS — E66.9 OBESITY (BMI 35.0-39.9 WITHOUT COMORBIDITY): ICD-10-CM

## 2018-03-27 DIAGNOSIS — R05.9 COUGH: ICD-10-CM

## 2018-03-27 DIAGNOSIS — Z76.89 ENCOUNTER TO ESTABLISH CARE: ICD-10-CM

## 2018-03-27 DIAGNOSIS — K04.7 DENTAL INFECTION: ICD-10-CM

## 2018-03-27 PROCEDURE — 99214 OFFICE O/P EST MOD 30 MIN: CPT | Performed by: PHYSICIAN ASSISTANT

## 2018-03-27 RX ORDER — AMOXICILLIN 500 MG/1
500 CAPSULE ORAL 3 TIMES DAILY
Qty: 30 CAP | Refills: 0 | Status: SHIPPED | OUTPATIENT
Start: 2018-03-27 | End: 2018-12-14

## 2018-03-28 NOTE — ASSESSMENT & PLAN NOTE
This is a 62-year-old male who is accompanied by his wife and daughter. They're dodger fans. He is here today to establish care. He has multiple hardware of titanium consistency in his body also has a dental implant in the left upper jaw. Because the dental implant he's been told in the past that he needs to have antibiotics. Typically he will take amoxicillin one day prior to the procedure and a couple days after. He has a procedure on Thursday. He was told by his dentist to get up prescription by his family practitioner. He has not seen the dentist yet. All he is having done as a routine scaling and cleaning. Denies any current teeth pain.

## 2018-03-28 NOTE — PROGRESS NOTES
Subjective:   Vernon Marianogo is a 62 y.o. male here today for establishing care and for cough.    Dental infection  This is a 62-year-old male who is accompanied by his wife and daughter. They're dodger fans. He is here today to establish care. He has multiple hardware of titanium consistency in his body also has a dental implant in the left upper jaw. Because the dental implant he's been told in the past that he needs to have antibiotics. Typically he will take amoxicillin one day prior to the procedure and a couple days after. He has a procedure on Thursday. He was told by his dentist to get up prescription by his family practitioner. He has not seen the dentist yet. All he is having done as a routine scaling and cleaning. Denies any current teeth pain.    Cough  Also complains of a cough for the past 3 weeks. Coughing is worse at nighttime. Denies any fevers. No chest pain. No shortness of breath. Coughing began after he was intubated during a procedure to correct an irregular heart beat. This was done at [facility]. He denies any cold symptoms. No runny nose.       Current medicines (including changes today)  Current Outpatient Prescriptions   Medication Sig Dispense Refill   • amoxicillin (AMOXIL) 500 MG Cap Take 1 Cap by mouth 3 times a day. 30 Cap 0   • rivaroxaban (XARELTO) 20 MG Tab tablet Take 1 Tab by mouth with dinner. 90 Tab 3   • sucralfate (CARAFATE) 1 GM Tab Take 1 Tab by mouth 4 Times a Day,Before Meals and at Bedtime. 120 Tab 0   • terbinafine (LAMISIL) 250 MG Tab Take 250 mg by mouth every day.     • metoprolol SR (TOPROL XL) 25 MG TABLET SR 24 HR Take 0.5 Tabs by mouth every bedtime. 30 Tab 1   • propafenone (RYTHMOL) 225 MG tablet TAKE 1 TABLET BY MOUTH EVERY 8 HOURS 270 Tab 2   • fenofibrate (TRICOR) 145 MG Tab Take 145 mg by mouth every day.     • loratadine (CLARITIN) 10 MG Tab Take 10 mg by mouth every day.     • aspirin EC (ECOTRIN) 81 MG Tablet Delayed Response Take 81 mg by  "mouth every day.     • omeprazole (PRILOSEC) 20 MG delayed-release capsule Take 20 mg by mouth every bedtime.     • ranitidine (ZANTAC) 150 MG Tab Take 150 mg by mouth 2 times a day.     • tamsulosin (FLOMAX) 0.4 MG capsule Take 0.4 mg by mouth every bedtime.     • atorvastatin (LIPITOR) 20 MG TABS Take 20 mg by mouth every evening.       No current facility-administered medications for this visit.      He  has a past medical history of Arrhythmia (2013); Arthritis; ASTHMA; Atrial fibrillation (CMS-HCC); Cataract (03/05/2018); Cholesterol blood decreased; Enlarged prostate; Heart burn; Hiatus hernia syndrome; High cholesterol; HTN (hypertension) (8/19/2013); Hypercholesteremia; PAF (paroxysmal atrial fibrillation) (CMS-HCC) (8/19/2013); Pain (2/10/14); Pneumonia (90's); Preop cardiovascular exam (8/19/2013); Sleep apnea; and Snoring. He also has no past medical history of Encounter for long-term (current) use of other medications.    Social History and Family History were reviewed and updated.    ROS   No chest pain, no shortness of breath, no abdominal pain and all other systems were reviewed and are negative.       Objective:     Blood pressure 136/78, pulse (!) 59, temperature 36.8 °C (98.3 °F), resp. rate 16, height 1.575 m (5' 2\"), weight 94.8 kg (209 lb), SpO2 99 %. Body mass index is 38.23 kg/m².   Physical Exam:  Constitutional: Alert, no distress.  Skin: Warm, dry, good turgor, no rashes in visible areas.  Eye: Equal, round and reactive, conjunctiva clear, lids normal.  ENMT: Lips without lesions, good dentition, oropharynx clear.  Neck: Trachea midline, no masses.   Lymph: No cervical or supraclavicular lymphadenopathy  Respiratory: Unlabored respiratory effort, lungs clear to auscultation, no wheezes, no ronchi.  Cardiovascular: Normal S1, S2, no murmur, no edema.  Abdomen: Soft, non-tender, no masses.  Psych: Alert and oriented x3, normal affect and mood.        Assessment and Plan:   The following " treatment plan was discussed    1. Dental infection  Dental prophylactic antibiotics for tooth implant. Advised there is no reason to get antibiotics for hardware but because the dentist is pushing for antibiotics provided amoxicillin to take as directed one day prior in 3 days after. Sent over a prescription for 10 days. Take only what is needed.  - amoxicillin (AMOXIL) 500 MG Cap; Take 1 Cap by mouth 3 times a day.  Dispense: 30 Cap; Refill: 0    2. Cough  Acute, new onset condition. Cough likely secondary to being intubated. Lungs are clear today. Expect symptoms to resolve in time. Follow-up if any worsening symptoms such as fever, shortness of breath or chest pain.    3. Encounter to establish care    4. Obesity (BMI 35.0-39.9 without comorbidity)  Will monitor.  - Patient identified as having weight management issue.  Appropriate orders and counseling given.      Followup: Return if symptoms worsen or fail to improve.    Please note that this dictation was created using voice recognition software. I have made every reasonable attempt to correct obvious errors, but I expect that there are errors of grammar and possibly content that I did not discover before finalizing the note.

## 2018-03-28 NOTE — ASSESSMENT & PLAN NOTE
Also complains of a cough for the past 3 weeks. Coughing is worse at nighttime. Denies any fevers. No chest pain. No shortness of breath. Coughing began after he was intubated during a procedure to correct an irregular heart beat. This was done at [facility]. He denies any cold symptoms. No runny nose.

## 2018-03-30 ENCOUNTER — TELEPHONE (OUTPATIENT)
Dept: CARDIOLOGY | Facility: MEDICAL CENTER | Age: 63
End: 2018-03-30

## 2018-03-30 NOTE — TELEPHONE ENCOUNTER
----- Message from Kimberlee Fischer sent at 3/29/2018  8:56 AM PDT -----  Regarding: Question about having dental cleaning   JUSTIN/Leilani    Patient's daughter, Karely, wants a call back at [phone]. Her father had an Ablation on 3/6 and he needs to have a dental cleaning. She wants to make sure that it's okay for him to have the procedure.

## 2018-03-30 NOTE — TELEPHONE ENCOUNTER
Form from dentist left at Dr. Sampson desk for review and sign.   L/M advising Karely we will ask Dr. Sampson regarding dental work.

## 2018-04-02 NOTE — TELEPHONE ENCOUNTER
Form faxed and sent to scan. No antibiotics needed, epi ok, no anesth restrictions, do not interrupt anticoag for 3 months for anything elective.

## 2018-04-03 ENCOUNTER — TELEPHONE (OUTPATIENT)
Dept: CARDIOLOGY | Facility: MEDICAL CENTER | Age: 63
End: 2018-04-03

## 2018-04-03 NOTE — TELEPHONE ENCOUNTER
Medication Refill  Re carafate    RIC Del Angel.P.RAbdulkadirN.   You 5 minutes ago (3:29 PM)      Does not need to continue (Routing comment)       You routed conversation to KHANH Del AngelPAbdulkadirRMALATHI 20 minutes ago (3:14 PM)      Sky Adams Ass't   You 1 hour ago (2:26 PM)      continue therapy? (Routing comment)

## 2018-06-18 ENCOUNTER — OFFICE VISIT (OUTPATIENT)
Dept: CARDIOLOGY | Facility: MEDICAL CENTER | Age: 63
End: 2018-06-18
Payer: COMMERCIAL

## 2018-06-18 VITALS
DIASTOLIC BLOOD PRESSURE: 78 MMHG | BODY MASS INDEX: 37.54 KG/M2 | OXYGEN SATURATION: 91 % | SYSTOLIC BLOOD PRESSURE: 130 MMHG | WEIGHT: 204 LBS | HEIGHT: 62 IN | HEART RATE: 59 BPM

## 2018-06-18 DIAGNOSIS — I10 ESSENTIAL HYPERTENSION: ICD-10-CM

## 2018-06-18 DIAGNOSIS — G47.33 OSA (OBSTRUCTIVE SLEEP APNEA): ICD-10-CM

## 2018-06-18 DIAGNOSIS — I48.0 PAF (PAROXYSMAL ATRIAL FIBRILLATION) (HCC): ICD-10-CM

## 2018-06-18 PROCEDURE — 99213 OFFICE O/P EST LOW 20 MIN: CPT | Performed by: INTERNAL MEDICINE

## 2018-06-18 ASSESSMENT — ENCOUNTER SYMPTOMS
FEVER: 0
HEADACHES: 0
NERVOUS/ANXIOUS: 0
BRUISES/BLEEDS EASILY: 0
BLURRED VISION: 0
NAUSEA: 0
SHORTNESS OF BREATH: 0
PND: 0
EYE DISCHARGE: 0
DEPRESSION: 0
PALPITATIONS: 0
DIZZINESS: 0
MYALGIAS: 0
CHILLS: 0
COUGH: 0
HEARTBURN: 0

## 2018-06-18 NOTE — PROGRESS NOTES
No chief complaint on file.      Subjective:   Vernon Sims is a 62 y.o. male who presents today in follow-up for ablation for PAF a little over 3 months ago.  Energy level is much improved.  No palpitations.  Compliant with CPAP.  No new medical issues.  He stopped his oral anticoagulant about a week ago    Past Medical History:   Diagnosis Date   • Arrhythmia 2013    A Flutter   • Arthritis     all joints (osteoarthritis)   • ASTHMA     inhalers   • Atrial fibrillation (HCC)    • Cataract 03/05/2018    Bilateral - no surgery   • Cholesterol blood decreased    • Enlarged prostate    • Heart burn    • Hiatus hernia syndrome    • High cholesterol    • HTN (hypertension) 8/19/2013   • Hypercholesteremia    • PAF (paroxysmal atrial fibrillation) (HCC) 8/19/2013   • Pain 2/10/14    10/10 right shoulder   • Pneumonia 90's   • Preop cardiovascular exam 8/19/2013   • Sleep apnea     cpap    • Snoring      Past Surgical History:   Procedure Laterality Date   • PTERYGIUM EXCISION Right 3/8/2016    Procedure: PTERYGIUM EXCISION;  Surgeon: Homer Parsons M.D.;  Location: [facility] ORS;  Service:    • PTERYGIUM EXCISION Left 11/11/2015    Procedure: PTERYGIUM EXCISION W/AMNIOGRAFT AND MITOMYCIN;  Surgeon: Homer Parsons M.D.;  Location: [facility];  Service:    • KNEE MANIPULATION Right 6/29/2015    Procedure: KNEE MANIPULATION;  Surgeon: Dave Knox M.D.;  Location: SURGERY SAME DAY [facility] ORS;  Service:    • KNEE ARTHROPLASTY TOTAL Right 5/11/2015    Procedure: KNEE ARTHROPLASTY TOTAL;  Surgeon: Dave Knox M.D.;  Location: SURGERY [facility] ORS;  Service:    • FULL THICKNESS BLOCK RESECTION  4/30/2014    Performed by Isacc Ramírez M.D. at [facility] ORS   • BIOPSY GENERAL  4/30/2014    left eye lid cyst removal   • SHOULDER ARTHROSCOPY W/ ROTATOR CUFF REPAIR  2/17/2014    Performed by Dave Knox M.D. at [facility]   •  KNEE UNICOMPARTMENTAL  10/21/2013    Performed by Dave Knox M.D. at SURGERY [facility] ORS   • KNEE ARTHROSCOPY  8/14/08    Performed by ANDERSON PLASCENCIA at SURGERY SAME DAY [facility] ORS   • MEDIAL MENISCECTOMY  8/14/08    Performed by ANDERSON PLASCENCIA at SURGERY SAME DAY [facility] ORS   • OTHER ORTHOPEDIC SURGERY  1987    right knee     Family History   Problem Relation Age of Onset   • Heart Disease Father    • Diabetes Father    • Heart Attack Mother 77   • Heart Disease Sister      surgery at age 10   • Stroke       Social History     Social History   • Marital status:      Spouse name: N/A   • Number of children: N/A   • Years of education: N/A     Occupational History   • Not on file.     Social History Main Topics   • Smoking status: Former Smoker     Packs/day: 1.00     Years: 13.00     Types: Cigarettes     Start date: 1/1/1967     Quit date: 1/1/1980   • Smokeless tobacco: Never Used   • Alcohol use No   • Drug use: No   • Sexual activity: Yes     Partners: Female     Other Topics Concern   • Not on file     Social History Narrative   • No narrative on file     No Known Allergies  Outpatient Encounter Prescriptions as of 6/18/2018   Medication Sig Dispense Refill   • metoprolol SR (TOPROL XL) 25 MG TABLET SR 24 HR Take 0.5 Tabs by mouth every bedtime. 30 Tab 1   • fenofibrate (TRICOR) 145 MG Tab Take 145 mg by mouth every day.     • loratadine (CLARITIN) 10 MG Tab Take 10 mg by mouth every day.     • aspirin EC (ECOTRIN) 81 MG Tablet Delayed Response Take 81 mg by mouth every day.     • omeprazole (PRILOSEC) 20 MG delayed-release capsule Take 20 mg by mouth every bedtime.     • ranitidine (ZANTAC) 150 MG Tab Take 150 mg by mouth 2 times a day.     • tamsulosin (FLOMAX) 0.4 MG capsule Take 0.4 mg by mouth every bedtime.     • amoxicillin (AMOXIL) 500 MG Cap Take 1 Cap by mouth 3 times a day. 30 Cap 0   • rivaroxaban (XARELTO) 20 MG Tab tablet Take 1 Tab by mouth with dinner. 90 Tab 3   •  "sucralfate (CARAFATE) 1 GM Tab Take 1 Tab by mouth 4 Times a Day,Before Meals and at Bedtime. 120 Tab 0   • terbinafine (LAMISIL) 250 MG Tab Take 250 mg by mouth every day.     • propafenone (RYTHMOL) 225 MG tablet TAKE 1 TABLET BY MOUTH EVERY 8 HOURS 270 Tab 2   • atorvastatin (LIPITOR) 20 MG TABS Take 20 mg by mouth every evening.       No facility-administered encounter medications on file as of 6/18/2018.      Review of Systems   Constitutional: Negative for chills, fever and malaise/fatigue.   Eyes: Negative for blurred vision and discharge.   Respiratory: Negative for cough and shortness of breath.    Cardiovascular: Negative for chest pain, palpitations, leg swelling and PND.   Gastrointestinal: Negative for heartburn and nausea.   Genitourinary: Negative for dysuria and urgency.   Musculoskeletal: Positive for joint pain. Negative for myalgias.   Skin: Negative for itching and rash.   Neurological: Negative for dizziness and headaches.   Endo/Heme/Allergies: Negative for environmental allergies. Does not bruise/bleed easily.   Psychiatric/Behavioral: Negative for depression. The patient is not nervous/anxious.         Objective:   /78   Pulse (!) 59   Ht 1.575 m (5' 2\")   Wt 92.5 kg (204 lb)   SpO2 91%   BMI 37.31 kg/m²     Physical Exam   Constitutional: He is oriented to person, place, and time. He appears well-developed and well-nourished.   Neck: No JVD present.   Cardiovascular: Normal rate and regular rhythm.  Exam reveals no gallop and no friction rub.    No murmur heard.  Pulmonary/Chest: Effort normal and breath sounds normal.   Abdominal: Soft. There is no tenderness.   Musculoskeletal: He exhibits no edema.   Neurological: He is alert and oriented to person, place, and time.   Skin: Skin is warm and dry.       Assessment:     1. PAF (paroxysmal atrial fibrillation) (HCC)     2. SAUL (obstructive sleep apnea)     3. Essential hypertension         Medical Decision Making:  Today's " Assessment / Status / Plan:   Clinical status stable after ablation  Blood pressure controlled  No change in meds  Continue low-dose aspirin  RTC 6 months

## 2018-06-18 NOTE — LETTER
[facility]   [address]   [address]  [place] [address]  Phone: [phone]  Fax: [phone]              Vernon Sims  1955    Encounter Date: 6/18/2018    Dave Ryan M.D.          PROGRESS NOTE:  No chief complaint on file.      Subjective:   Vernon Sims is a 62 y.o. male who presents today in follow-up for ablation for PAF a little over 3 months ago.  Energy level is much improved.  No palpitations.  Compliant with CPAP.  No new medical issues.  He stopped his oral anticoagulant about a week ago    Past Medical History:   Diagnosis Date   • Arrhythmia 2013    A Flutter   • Arthritis     all joints (osteoarthritis)   • ASTHMA     inhalers   • Atrial fibrillation (HCC)    • Cataract 03/05/2018    Bilateral - no surgery   • Cholesterol blood decreased    • Enlarged prostate    • Heart burn    • Hiatus hernia syndrome    • High cholesterol    • HTN (hypertension) 8/19/2013   • Hypercholesteremia    • PAF (paroxysmal atrial fibrillation) (HCC) 8/19/2013   • Pain 2/10/14    10/10 right shoulder   • Pneumonia 90's   • Preop cardiovascular exam 8/19/2013   • Sleep apnea     cpap    • Snoring      Past Surgical History:   Procedure Laterality Date   • PTERYGIUM EXCISION Right 3/8/2016    Procedure: PTERYGIUM EXCISION;  Surgeon: Homer Parsons M.D.;  Location: [facility];  Service:    • PTERYGIUM EXCISION Left 11/11/2015    Procedure: PTERYGIUM EXCISION W/AMNIOGRAFT AND MITOMYCIN;  Surgeon: Homer Parsons M.D.;  Location: [facility] ORS;  Service:    • KNEE MANIPULATION Right 6/29/2015    Procedure: KNEE MANIPULATION;  Surgeon: Dave Knox M.D.;  Location: [facility] SAME DAY [facility] ORS;  Service:    • KNEE ARTHROPLASTY TOTAL Right 5/11/2015    Procedure: KNEE ARTHROPLASTY TOTAL;  Surgeon: Dave Knox M.D.;  Location: [facility];  Service:       • FULL THICKNESS BLOCK RESECTION  4/30/2014    Performed by Isacc Ramírez M.D. at [facility]   • BIOPSY GENERAL  4/30/2014    left eye lid cyst removal   • SHOULDER ARTHROSCOPY W/ ROTATOR CUFF REPAIR  2/17/2014    Performed by Dave Knox M.D. at SURGERY [facility] ORS   • KNEE UNICOMPARTMENTAL  10/21/2013    Performed by Dave Knox M.D. at SURGERY [facility] ORS   • KNEE ARTHROSCOPY  8/14/08    Performed by ANDERSON PLASCENCIA at SURGERY SAME DAY [facility] ORS   • MEDIAL MENISCECTOMY  8/14/08    Performed by ANDERSON PLASCENCIA at SURGERY SAME DAY [facility] ORS   • OTHER ORTHOPEDIC SURGERY  1987    right knee     Family History   Problem Relation Age of Onset   • Heart Disease Father    • Diabetes Father    • Heart Attack Mother 77   • Heart Disease Sister      surgery at age 10   • Stroke       Social History     Social History   • Marital status:      Spouse name: N/A   • Number of children: N/A   • Years of education: N/A     Occupational History   • Not on file.     Social History Main Topics   • Smoking status: Former Smoker     Packs/day: 1.00     Years: 13.00     Types: Cigarettes     Start date: 1/1/1967     Quit date: 1/1/1980   • Smokeless tobacco: Never Used   • Alcohol use No   • Drug use: No   • Sexual activity: Yes     Partners: Female     Other Topics Concern   • Not on file     Social History Narrative   • No narrative on file     No Known Allergies  Outpatient Encounter Prescriptions as of 6/18/2018   Medication Sig Dispense Refill   • metoprolol SR (TOPROL XL) 25 MG TABLET SR 24 HR Take 0.5 Tabs by mouth every bedtime. 30 Tab 1   • fenofibrate (TRICOR) 145 MG Tab Take 145 mg by mouth every day.     • loratadine (CLARITIN) 10 MG Tab Take 10 mg by mouth every day.     • aspirin EC (ECOTRIN) 81 MG Tablet Delayed Response Take 81 mg by mouth every day.     • omeprazole (PRILOSEC) 20 MG delayed-release capsule Take 20 mg by mouth every bedtime.     • ranitidine  "(ZANTAC) 150 MG Tab Take 150 mg by mouth 2 times a day.     • tamsulosin (FLOMAX) 0.4 MG capsule Take 0.4 mg by mouth every bedtime.     • amoxicillin (AMOXIL) 500 MG Cap Take 1 Cap by mouth 3 times a day. 30 Cap 0   • rivaroxaban (XARELTO) 20 MG Tab tablet Take 1 Tab by mouth with dinner. 90 Tab 3   • sucralfate (CARAFATE) 1 GM Tab Take 1 Tab by mouth 4 Times a Day,Before Meals and at Bedtime. 120 Tab 0   • terbinafine (LAMISIL) 250 MG Tab Take 250 mg by mouth every day.     • propafenone (RYTHMOL) 225 MG tablet TAKE 1 TABLET BY MOUTH EVERY 8 HOURS 270 Tab 2   • atorvastatin (LIPITOR) 20 MG TABS Take 20 mg by mouth every evening.       No facility-administered encounter medications on file as of 6/18/2018.      Review of Systems   Constitutional: Negative for chills, fever and malaise/fatigue.   Eyes: Negative for blurred vision and discharge.   Respiratory: Negative for cough and shortness of breath.    Cardiovascular: Negative for chest pain, palpitations, leg swelling and PND.   Gastrointestinal: Negative for heartburn and nausea.   Genitourinary: Negative for dysuria and urgency.   Musculoskeletal: Positive for joint pain. Negative for myalgias.   Skin: Negative for itching and rash.   Neurological: Negative for dizziness and headaches.   Endo/Heme/Allergies: Negative for environmental allergies. Does not bruise/bleed easily.   Psychiatric/Behavioral: Negative for depression. The patient is not nervous/anxious.         Objective:   /78   Pulse (!) 59   Ht 1.575 m (5' 2\")   Wt 92.5 kg (204 lb)   SpO2 91%   BMI 37.31 kg/m²      Physical Exam   Constitutional: He is oriented to person, place, and time. He appears well-developed and well-nourished.   Neck: No JVD present.   Cardiovascular: Normal rate and regular rhythm.  Exam reveals no gallop and no friction rub.    No murmur heard.  Pulmonary/Chest: Effort normal and breath sounds normal.   Abdominal: Soft. There is no tenderness.   Musculoskeletal: " He exhibits no edema.   Neurological: He is alert and oriented to person, place, and time.   Skin: Skin is warm and dry.       Assessment:     1. PAF (paroxysmal atrial fibrillation) (HCC)     2. SAUL (obstructive sleep apnea)     3. Essential hypertension         Medical Decision Making:  Today's Assessment / Status / Plan:   Clinical status stable after ablation  Blood pressure controlled  No change in meds  Continue low-dose aspirin  RTC 6 months      Addy Poole D.O.  [facility] [address]  VIA Facsimile: [phone]

## 2018-06-22 ENCOUNTER — HOSPITAL ENCOUNTER (OUTPATIENT)
Dept: LAB | Facility: MEDICAL CENTER | Age: 63
End: 2018-06-22
Attending: FAMILY MEDICINE
Payer: COMMERCIAL

## 2018-06-22 LAB
ALBUMIN SERPL BCP-MCNC: 4.2 G/DL (ref 3.2–4.9)
BUN SERPL-MCNC: 24 MG/DL (ref 8–22)
CALCIUM SERPL-MCNC: 9.4 MG/DL (ref 8.5–10.5)
CHLORIDE SERPL-SCNC: 105 MMOL/L (ref 96–112)
CHOLEST SERPL-MCNC: 146 MG/DL (ref 100–199)
CO2 SERPL-SCNC: 28 MMOL/L (ref 20–33)
CREAT SERPL-MCNC: 1.1 MG/DL (ref 0.5–1.4)
EST. AVERAGE GLUCOSE BLD GHB EST-MCNC: 123 MG/DL
GLUCOSE SERPL-MCNC: 102 MG/DL (ref 65–99)
HBA1C MFR BLD: 5.9 % (ref 0–5.6)
HDLC SERPL-MCNC: 39 MG/DL
LDLC SERPL CALC-MCNC: 88 MG/DL
PHOSPHATE SERPL-MCNC: 3.3 MG/DL (ref 2.5–4.5)
POTASSIUM SERPL-SCNC: 4.4 MMOL/L (ref 3.6–5.5)
SODIUM SERPL-SCNC: 140 MMOL/L (ref 135–145)
TRIGL SERPL-MCNC: 95 MG/DL (ref 0–149)

## 2018-06-22 PROCEDURE — 80061 LIPID PANEL: CPT

## 2018-06-22 PROCEDURE — 80069 RENAL FUNCTION PANEL: CPT

## 2018-06-22 PROCEDURE — 83036 HEMOGLOBIN GLYCOSYLATED A1C: CPT

## 2018-06-22 PROCEDURE — 36415 COLL VENOUS BLD VENIPUNCTURE: CPT

## 2018-07-28 ENCOUNTER — HOSPITAL ENCOUNTER (EMERGENCY)
Facility: MEDICAL CENTER | Age: 63
End: 2018-07-28
Payer: COMMERCIAL

## 2018-12-08 ENCOUNTER — HOSPITAL ENCOUNTER (OUTPATIENT)
Facility: MEDICAL CENTER | Age: 63
End: 2018-12-08
Attending: PHYSICIAN ASSISTANT
Payer: COMMERCIAL

## 2018-12-08 ENCOUNTER — OFFICE VISIT (OUTPATIENT)
Dept: URGENT CARE | Facility: PHYSICIAN GROUP | Age: 63
End: 2018-12-08
Payer: COMMERCIAL

## 2018-12-08 VITALS
WEIGHT: 195 LBS | OXYGEN SATURATION: 99 % | TEMPERATURE: 97.9 F | BODY MASS INDEX: 35.88 KG/M2 | HEIGHT: 62 IN | DIASTOLIC BLOOD PRESSURE: 68 MMHG | SYSTOLIC BLOOD PRESSURE: 104 MMHG | HEART RATE: 84 BPM

## 2018-12-08 DIAGNOSIS — R10.32 LEFT LOWER QUADRANT PAIN: ICD-10-CM

## 2018-12-08 DIAGNOSIS — J98.8 RTI (RESPIRATORY TRACT INFECTION): ICD-10-CM

## 2018-12-08 DIAGNOSIS — R06.02 SOB (SHORTNESS OF BREATH): ICD-10-CM

## 2018-12-08 DIAGNOSIS — K44.9 HIATAL HERNIA: ICD-10-CM

## 2018-12-08 LAB
ALBUMIN SERPL BCP-MCNC: 4.2 G/DL (ref 3.2–4.9)
ALBUMIN/GLOB SERPL: 1.1 G/DL
ALP SERPL-CCNC: 34 U/L (ref 30–99)
ALT SERPL-CCNC: 26 U/L (ref 2–50)
AMBIGUOUS DTTM AMBI4: NORMAL
ANION GAP SERPL CALC-SCNC: 9 MMOL/L (ref 0–11.9)
AST SERPL-CCNC: 25 U/L (ref 12–45)
BILIRUB SERPL-MCNC: 0.7 MG/DL (ref 0.1–1.5)
BUN SERPL-MCNC: 16 MG/DL (ref 8–22)
CALCIUM SERPL-MCNC: 10 MG/DL (ref 8.5–10.5)
CHLORIDE SERPL-SCNC: 101 MMOL/L (ref 96–112)
CO2 SERPL-SCNC: 24 MMOL/L (ref 20–33)
CREAT SERPL-MCNC: 1.37 MG/DL (ref 0.5–1.4)
GLOBULIN SER CALC-MCNC: 3.7 G/DL (ref 1.9–3.5)
GLUCOSE SERPL-MCNC: 92 MG/DL (ref 65–99)
POTASSIUM SERPL-SCNC: 4 MMOL/L (ref 3.6–5.5)
PROT SERPL-MCNC: 7.9 G/DL (ref 6–8.2)
SODIUM SERPL-SCNC: 134 MMOL/L (ref 135–145)

## 2018-12-08 PROCEDURE — 80053 COMPREHEN METABOLIC PANEL: CPT

## 2018-12-08 PROCEDURE — 99214 OFFICE O/P EST MOD 30 MIN: CPT | Performed by: PHYSICIAN ASSISTANT

## 2018-12-08 RX ORDER — ALBUTEROL SULFATE 90 UG/1
2 AEROSOL, METERED RESPIRATORY (INHALATION) EVERY 6 HOURS PRN
Qty: 8.5 G | Refills: 0 | Status: SHIPPED | OUTPATIENT
Start: 2018-12-08 | End: 2018-12-14

## 2018-12-08 RX ORDER — DOXYCYCLINE HYCLATE 100 MG
100 TABLET ORAL 2 TIMES DAILY
Qty: 20 TAB | Refills: 0 | Status: SHIPPED | OUTPATIENT
Start: 2018-12-08

## 2018-12-08 ASSESSMENT — ENCOUNTER SYMPTOMS
ABDOMINAL PAIN: 1
NAUSEA: 0
BLOOD IN STOOL: 0
COUGH: 1
ARTHRALGIAS: 0
HEADACHES: 1
FLANK PAIN: 0
SHORTNESS OF BREATH: 1
ANOREXIA: 1
MYALGIAS: 0
HEMATOCHEZIA: 0
CHILLS: 0
DIZZINESS: 0
VOMITING: 0
DIARRHEA: 1
MUSCULOSKELETAL NEGATIVE: 1
WHEEZING: 0
FEVER: 1
CONSTIPATION: 0
CARDIOVASCULAR NEGATIVE: 1

## 2018-12-08 NOTE — PROGRESS NOTES
Subjective:      Vernon Sims is a 63 y.o. male who presents with Dizziness (Dizzy/coughing/fever/poor apetite/pt states indegestion x 12/6 ) and GI Problem (x 11/23 )            LLQ Pain   This is a new problem. The current episode started 1 to 4 weeks ago. The onset quality is sudden. The problem occurs constantly. The problem has been unchanged. The pain is located in the generalized abdominal region and LLQ. The quality of the pain is sharp. The abdominal pain does not radiate. Associated symptoms include anorexia, diarrhea, a fever and headaches. Pertinent negatives include no arthralgias, constipation, dysuria, frequency, hematochezia, hematuria, melena, myalgias, nausea or vomiting. Nothing aggravates the pain. He has tried nothing for the symptoms. The treatment provided no relief. Hiatal hernia   Unexplained diarrhea and left lower quadrant pain for the last few weeks.  He also has generalized abdominal pain related to his large hiatal hernia.    Patient also complaining of cough, shortness of breath, fever over the last few days.  Sick contacts at home.    PMH:  has a past medical history of Arrhythmia (2013); Arthritis; ASTHMA; Atrial fibrillation (HCC); Cataract (03/05/2018); Cholesterol blood decreased; Enlarged prostate; Heart burn; Hiatus hernia syndrome; High cholesterol; HTN (hypertension) (8/19/2013); Hypercholesteremia; PAF (paroxysmal atrial fibrillation) (HCC) (8/19/2013); Pain (2/10/14); Pneumonia (90's); Preop cardiovascular exam (8/19/2013); Sleep apnea; and Snoring. He also has no past medical history of Encounter for long-term (current) use of other medications.  MEDS:   Current Outpatient Prescriptions:   •  doxycycline (VIBRAMYCIN) 100 MG Tab, Take 1 Tab by mouth 2 times a day., Disp: 20 Tab, Rfl: 0  •  albuterol 108 (90 Base) MCG/ACT Aero Soln inhalation aerosol, Inhale 2 Puffs by mouth every 6 hours as needed for Shortness of Breath., Disp: 8.5 g, Rfl: 0  •  sucralfate  (CARAFATE) 1 GM Tab, Take 1 Tab by mouth 4 Times a Day,Before Meals and at Bedtime., Disp: 120 Tab, Rfl: 0  •  metoprolol SR (TOPROL XL) 25 MG TABLET SR 24 HR, Take 0.5 Tabs by mouth every bedtime., Disp: 30 Tab, Rfl: 1  •  fenofibrate (TRICOR) 145 MG Tab, Take 145 mg by mouth every day., Disp: , Rfl:   •  aspirin EC (ECOTRIN) 81 MG Tablet Delayed Response, Take 81 mg by mouth every day., Disp: , Rfl:   •  omeprazole (PRILOSEC) 20 MG delayed-release capsule, Take 20 mg by mouth every bedtime., Disp: , Rfl:   •  ranitidine (ZANTAC) 150 MG Tab, Take 150 mg by mouth 2 times a day., Disp: , Rfl:   •  tamsulosin (FLOMAX) 0.4 MG capsule, Take 0.4 mg by mouth every bedtime., Disp: , Rfl:   •  atorvastatin (LIPITOR) 20 MG TABS, Take 20 mg by mouth every evening., Disp: , Rfl:   •  amoxicillin (AMOXIL) 500 MG Cap, Take 1 Cap by mouth 3 times a day. (Patient not taking: Reported on 12/8/2018), Disp: 30 Cap, Rfl: 0  •  terbinafine (LAMISIL) 250 MG Tab, Take 250 mg by mouth every day., Disp: , Rfl:   •  propafenone (RYTHMOL) 225 MG tablet, TAKE 1 TABLET BY MOUTH EVERY 8 HOURS (Patient not taking: Reported on 12/8/2018), Disp: 270 Tab, Rfl: 2  •  loratadine (CLARITIN) 10 MG Tab, Take 10 mg by mouth every day., Disp: , Rfl:   ALLERGIES: No Known Allergies  SURGHX:   Past Surgical History:   Procedure Laterality Date   • PTERYGIUM EXCISION Right 3/8/2016    Procedure: PTERYGIUM EXCISION;  Surgeon: Homer Parsons M.D.;  [facility];  Service:    • PTERYGIUM EXCISION Left 11/11/2015    Procedure: PTERYGIUM EXCISION W/AMNIOGRAFT AND MITOMYCIN;  Surgeon: Homer Parsons M.D.;  Location: [facility];  Service:    • KNEE MANIPULATION Right 6/29/2015    Procedure: KNEE MANIPULATION;  Surgeon: Dave Knox M.D.;  Location: SURGERY SAME DAY [facility];  Service:    • KNEE ARTHROPLASTY TOTAL Right 5/11/2015    Procedure: KNEE ARTHROPLASTY TOTAL;  Surgeon: Dave Knox M.D.;  Location:  SURGERY [facility] ORS;  Service:    • FULL THICKNESS BLOCK RESECTION  4/30/2014    Performed by Isacc Ramírez M.D. at SURGERY [facility]   • BIOPSY GENERAL  4/30/2014    left eye lid cyst removal   • SHOULDER ARTHROSCOPY W/ ROTATOR CUFF REPAIR  2/17/2014    Performed by Dave Knox M.D. at SURGERY [facility] ORS   • KNEE UNICOMPARTMENTAL  10/21/2013    Performed by Dave Knox M.D. at SURGERY [facility] ORS   • KNEE ARTHROSCOPY  8/14/08    Performed by ANDERSON PLASCENCIA at SURGERY SAME DAY [facility] ORS   • MEDIAL MENISCECTOMY  8/14/08    Performed by ANDERSON PLASCENCIA at SURGERY SAME DAY [facility] ORS   • OTHER ORTHOPEDIC SURGERY  1987    right knee     SOCHX:  reports that he quit smoking about 38 years ago. His smoking use included Cigarettes. He started smoking about 51 years ago. He has a 13.00 pack-year smoking history. He has never used smokeless tobacco. He reports that he does not drink alcohol or use drugs.  FH: family history includes Diabetes in his father; Heart Attack (age of onset: 77) in his mother; Heart Disease in his father and sister; Stroke in his unknown relative.      Review of Systems   Constitutional: Positive for fever. Negative for chills.   HENT: Negative.    Respiratory: Positive for cough and shortness of breath. Negative for wheezing.    Cardiovascular: Negative.    Gastrointestinal: Positive for abdominal pain, anorexia and diarrhea. Negative for blood in stool, constipation, hematochezia, melena, nausea and vomiting.   Genitourinary: Negative for dysuria, flank pain, frequency, hematuria and urgency.   Musculoskeletal: Negative.  Negative for arthralgias and myalgias.   Neurological: Positive for headaches. Negative for dizziness.   All other systems reviewed and are negative.      Medications, Allergies, and current problem list reviewed today in Epic  Family history reviewed with patient and is not pertinent for today's visit       Objective:     /68   " Pulse 84   Temp 36.6 °C (97.9 °F)   Ht 1.575 m (5' 2\")   Wt 88.5 kg (195 lb)   SpO2 99%   BMI 35.67 kg/m²      Physical Exam   Constitutional: He is oriented to person, place, and time. He appears well-developed and well-nourished. No distress.   HENT:   Head: Normocephalic and atraumatic.   Right Ear: Tympanic membrane and external ear normal.   Left Ear: Tympanic membrane and external ear normal.   Nose: Nose normal.   Mouth/Throat: Oropharynx is clear and moist. No oropharyngeal exudate.   Eyes: Pupils are equal, round, and reactive to light. Conjunctivae and EOM are normal. Right eye exhibits no discharge. Left eye exhibits no discharge.   Neck: Normal range of motion. Neck supple.   Cardiovascular: Normal rate, regular rhythm and normal heart sounds.    No murmur heard.  Pulmonary/Chest: Effort normal and breath sounds normal. No respiratory distress. He has no wheezes. He exhibits no tenderness.   Abdominal: Soft. Normal appearance and bowel sounds are normal. There is tenderness in the left lower quadrant. There is no rigidity, no rebound, no guarding, no CVA tenderness, no tenderness at McBurney's point and negative Saez's sign. A hernia is present. Hernia confirmed positive in the ventral area (Large, chronic  , Midline upper).       Lymphadenopathy:     He has no cervical adenopathy.   Neurological: He is alert and oriented to person, place, and time.   Skin: Skin is warm and dry. He is not diaphoretic.   Psychiatric: He has a normal mood and affect. His behavior is normal. Judgment and thought content normal.   Nursing note and vitals reviewed.              Assessment/Plan:     1. RTI (respiratory tract infection)  doxycycline (VIBRAMYCIN) 100 MG Tab    albuterol 108 (90 Base) MCG/ACT Aero Soln inhalation aerosol   2. SOB (shortness of breath)  albuterol 108 (90 Base) MCG/ACT Aero Soln inhalation aerosol   3. Left lower quadrant pain  CT-ABDOMEN-PELVIS WITH    COMP METABOLIC PANEL   4. Hiatal " hernia  REFERRAL TO GENERAL SURGERY     Generalized abdominal pain including left lower quadrant pain with unexplained diarrhea.  History of large hiatal hernia.  A CT scan was ordered which showed no signs of diverticulitis or acute intra-abdominal inflammation or infection.  Large hiatal hernia again noted.  General surgery referral for hernia repair given pain and symptomology.    Respiratory tract infection, lungs clear bilaterally, PO2 adequate.  OTC meds and conservative measures as discussed    Return to clinic or go to ED if symptoms worsen or persist. Indications for ED discussed at length. Patient voices understanding. Follow-up with your primary care provider in 3-5 days. Red flags discussed. All side effects of medication discussed including allergic response, GI upset, tendon injury, etc.    Please note that this dictation was created using voice recognition software. I have made every reasonable attempt to correct obvious errors, but I expect that there are errors of grammar and possibly content that I did not discover before finalizing the note.

## 2018-12-09 ENCOUNTER — HOSPITAL ENCOUNTER (OUTPATIENT)
Dept: RADIOLOGY | Facility: MEDICAL CENTER | Age: 63
End: 2018-12-09
Attending: PHYSICIAN ASSISTANT
Payer: COMMERCIAL

## 2018-12-09 DIAGNOSIS — R10.32 LEFT LOWER QUADRANT PAIN: ICD-10-CM

## 2018-12-09 PROCEDURE — 700117 HCHG RX CONTRAST REV CODE 255: Performed by: PHYSICIAN ASSISTANT

## 2018-12-09 PROCEDURE — 74177 CT ABD & PELVIS W/CONTRAST: CPT

## 2018-12-09 RX ADMIN — IOHEXOL 100 ML: 350 INJECTION, SOLUTION INTRAVENOUS at 11:00

## 2018-12-09 RX ADMIN — IOHEXOL 50 ML: 240 INJECTION, SOLUTION INTRATHECAL; INTRAVASCULAR; INTRAVENOUS; ORAL at 11:00

## 2018-12-09 NOTE — PATIENT INSTRUCTIONS
Acidez estomacal  (Heartburn)  La acidez estomacal es un tipo de dolor o de molestia que se puede presentar en la garganta o en el pecho. A menudo se la describe paul mazin quemazón. También puede producir mal aliento. La sensación de acidez puede empeorar al acostarse o inclinarse, y suele ser más intensa antoni la noche. La acidez puede deberse al retroceso de los contenidos estomacales hacia el esófago (reflujo).  INSTRUCCIONES PARA EL CUIDADO EN EL HOGAR  Vallejo estas medidas para aliviar las molestias y evitar las complicaciones.  Dieta   · Siga la dieta que le haya recomendado el médico, la cual puede incluir evitar alimentos y bebidas tales paul:  ¨ Café y té (con o sin cafeína).  ¨ Bebidas que contengan alcohol.  ¨ Bebidas energizantes y deportivas.  ¨ Gaseosas o refrescos.  ¨ Chocolate y cacao.  ¨ Menta y esencias de menta.  ¨ [place] y cebollas.  ¨ Rábano picante.  ¨ Alimentos muy condimentados y ácidos, entre ellos, pimientos, chile en polvo, segundo en polvo, vinagre, salsas picantes y salsa barbacoa.  ¨ Frutas cítricas y lia jugos, paul naranjas, dalila y martha.  ¨ Alimentos a base de tomates, paul salsa vida, chile, salsa y pizza con salsa vida.  ¨ Alimentos fritos y grasos, paul rosquillas, danilo fritas y aderezos con alto contenido de grasa.  ¨ Cecilia con alto contenido de grasa, paul hot dogs y hi grasos de cecilia viveros y alma, por ejemplo, filetes de entrecot, salchicha, jamón y tocino.  ¨ Productos lácteos con alto contenido de grasa, paul leche entera, mantequilla y queso crema.  · Christina comidas pequeñas y frecuentes antoni el día en lugar de comidas abundantes.  · Evite beber mucho líquido con las comidas.  · No coma antoni las 2 o 3 horas previas a la hora de acostarse.  · No se acueste inmediatamente después de comer.  · No christina actividad física enseguida después de comer.  Instrucciones generales   · Esté atento a cualquier cambio en los síntomas.  · Vallejo los medicamentos de venta librado y los  recetados solamente paul se lo haya indicado el médico. No tome aspirina, ibuprofeno ni otros antiinflamatorios no esteroides (FRANCES), a menos que se lo haya indicado el médico.  · No consuma ningún producto que contenga tabaco, lo que incluye cigarrillos, tabaco de mascar y cigarrillos electrónicos. Si necesita ayuda para dejar de fumar, consulte al médico.  · Use ropas sueltas. No use prendas ajustadas alrededor de la cintura que ejerzan presión en el abdomen.  · Levante (eleve) unas 6 pulgadas (15 centímetros) la cabecera de la cama.  · Trate de reducir el nivel de estrés con actividades paul el yoga o la meditación. Si necesita ayuda para reducir el nivel de estrés, consulte al médico.  · Si tiene sobrepeso, adelgace hasta alcanzar un peso saludable. Hable con el médico acerca de baron peso ideal y pídale asesoramiento en cuanto a la dieta que debe seguir para poder alcanzarlo.  · Concurra a todas las visitas de control paul se lo haya indicado el médico. [place] es importante.  SOLICITE ATENCIÓN MÉDICA SI:  · Aparecen nuevos síntomas.  · Baja de peso sin causa aparente.  · Tiene dificultad para tragar o siente dolor al hacerlo.  · Tiene sibilancias o tos persistente.  · Los síntomas no mejoran con el tratamiento.  · Tiene acidez frecuente antoni más de dos semanas.  SOLICITE ATENCIÓN MÉDICA DE INMEDIATO SI:  · Tiene dolor en los brazos, el alayna, los maxilares, la dentadura o la espalda.  · Transpira, se marea o tiene sensación de desvanecimiento.  · Siente falta de aire o dolor en el pecho.  · Vomita y el vómito es parecido a la adi o a los granos de café.  · Las heces son sanguinolentas o de color lynda.  Esta información no tiene paul fin reemplazar el consejo del médico. Asegúrese de hacerle al médico cualquier pregunta que tenga.  Document Released: 08/29/2012 Document Revised: 09/07/2016 Document Reviewed: 04/13/2016  Elsevier Interactive Patient Education © 2017 Elsevier Inc.

## 2018-12-14 ENCOUNTER — OFFICE VISIT (OUTPATIENT)
Dept: CARDIOLOGY | Facility: MEDICAL CENTER | Age: 63
End: 2018-12-14
Payer: COMMERCIAL

## 2018-12-14 VITALS
BODY MASS INDEX: 34.96 KG/M2 | OXYGEN SATURATION: 94 % | HEIGHT: 62 IN | HEART RATE: 68 BPM | SYSTOLIC BLOOD PRESSURE: 130 MMHG | WEIGHT: 190 LBS | DIASTOLIC BLOOD PRESSURE: 80 MMHG

## 2018-12-14 DIAGNOSIS — E78.5 DYSLIPIDEMIA: ICD-10-CM

## 2018-12-14 DIAGNOSIS — I10 ESSENTIAL HYPERTENSION: ICD-10-CM

## 2018-12-14 DIAGNOSIS — I48.0 PAF (PAROXYSMAL ATRIAL FIBRILLATION) (HCC): ICD-10-CM

## 2018-12-14 DIAGNOSIS — N18.30 CKD (CHRONIC KIDNEY DISEASE) STAGE 3, GFR 30-59 ML/MIN (HCC): ICD-10-CM

## 2018-12-14 DIAGNOSIS — R94.39 ABNORMAL CARDIOVASCULAR STRESS TEST: ICD-10-CM

## 2018-12-14 DIAGNOSIS — I34.0 NON-RHEUMATIC MITRAL REGURGITATION: ICD-10-CM

## 2018-12-14 DIAGNOSIS — G47.33 OSA (OBSTRUCTIVE SLEEP APNEA): ICD-10-CM

## 2018-12-14 DIAGNOSIS — Z82.49 FAMILY HISTORY OF CORONARY ARTERY DISEASE IN BROTHER: ICD-10-CM

## 2018-12-14 PROCEDURE — 99214 OFFICE O/P EST MOD 30 MIN: CPT | Performed by: INTERNAL MEDICINE

## 2018-12-14 ASSESSMENT — ENCOUNTER SYMPTOMS
COUGH: 1
ABDOMINAL PAIN: 1
DIZZINESS: 1
HEARTBURN: 1
DIARRHEA: 1

## 2018-12-14 NOTE — LETTER
"     [facility]   [address], [address]  [place] [address]  Phone: [phone]  Fax: [phone]              Vernon Sims  1955    Encounter Date: 2018    Kimberlee Fontana M.D.          PROGRESS NOTE:  Subjective:   Chief Complaint:   Chief Complaint   Patient presents with   • Atrial Fibrillation     PP of RG       Vernon Sims is a 63 y.o. male who returns for follow-up of paroxysmal atrial fibrillation with prior ablation 2018.  He also has sleep apnea or CPAP, hypertension and hyperlipidemia.  He was previously on propafenone.    Is currently on metoprolol and fenofibrate. His daughter remembers him being on lipitor but doesn't recall why it was stopped, no recollection of myalgias. Family history of coronary artery disease, his brother had a heart attack at 50 and .  LDL cholesterol is 88.  Blood pressure is borderline controlled today.    He is not limited by chest pain, pressure or tightness. No significant dyspnea on exertion, orthopnea or lower extremity swelling. No significant palpitations, dizziness, or presyncope/syncope. No symptoms of leg claudication.  Rare dizziness after bending over, not cardiac.    Prior \"small infarct\" on nuc , c/w false positive/artifact.    He has bad heart burn, big hiatal hernia, going to have surgery for this.  Has stopped primary prevention ASA 81 mg.     DATA REVIEWED by me:  ECG 3/14/2018  Sinus bradycardia, rate 57, normal EKG    Stress test 2018  Decreased uptake in the apical and mid anterior wall consistent with infarct versus artifact, EF 59%; probably false positive.    Echo 2018  EF 65%, mild concentric LVH, aortic valve sclerosis without stenosis, normal size left atrium, mild mitral annular calcification with moderate MR    A. fib ablation 2018  CONCLUSIONS:  1.  Presentation in normal sinus rhythm with normal conduction " intervals.  2.  Successful pulmonary vein isolation.  3.  Successful cavotricuspid isthmus ablation with bidirectional block.  4.  No inducible tachycardia.  5.  Normal anatomy seen with our intracardiac echocardiography and   3-dimensional mapping that were both used to decrease the use of fluoroscopy   down to 2.6 minutes for this case.    PFTs January 23, 2017  Essentially normal      Most recent labs:     12/8/2018 sodium 134, potassium 4, creatinine 1.37 with GFR 52, LFTs normal    6/22/2018 total cholesterol 146, TGs 95, HDL 39, LDL 88    Past Medical History:   Diagnosis Date   • Arrhythmia 2013    A Flutter   • Arthritis     all joints (osteoarthritis)   • ASTHMA     inhalers   • Atrial fibrillation (HCC)    • Cataract 03/05/2018    Bilateral - no surgery   • Cholesterol blood decreased    • Enlarged prostate    • Heart burn    • Hiatus hernia syndrome    • High cholesterol    • HTN (hypertension) 8/19/2013   • Hypercholesteremia    • PAF (paroxysmal atrial fibrillation) (HCC) 8/19/2013   • Pain 2/10/14    10/10 right shoulder   • Pneumonia 90's   • Preop cardiovascular exam 8/19/2013   • Sleep apnea     cpap    • Snoring      Past Surgical History:   Procedure Laterality Date   • PTERYGIUM EXCISION Right 3/8/2016    Procedure: PTERYGIUM EXCISION;  Surgeon: Homer Parsons M.D.;  Location: SURGERY [facility] ORS;  Service:    • PTERYGIUM EXCISION Left 11/11/2015    Procedure: PTERYGIUM EXCISION W/AMNIOGRAFT AND MITOMYCIN;  Surgeon: Homer Parsons M.D.;  Location: SURGERY [facility] ORS;  Service:    • KNEE MANIPULATION Right 6/29/2015    Procedure: KNEE MANIPULATION;  Surgeon: Dave Knox M.D.;  Location: SURGERY SAME DAY [facility] ORS;  Service:    • KNEE ARTHROPLASTY TOTAL Right 5/11/2015    Procedure: KNEE ARTHROPLASTY TOTAL;  Surgeon: Dave Knox M.D.;  Location: SURGERY [facility] ORS;  Service:    • FULL THICKNESS BLOCK RESECTION  4/30/2014    Performed by Isacc Ramírez  M.D. at SURGERY [facility] ORS   • BIOPSY GENERAL  4/30/2014    left eye lid cyst removal   • SHOULDER ARTHROSCOPY W/ ROTATOR CUFF REPAIR  2/17/2014    Performed by Dave Knox M.D. at SURGERY [facility] ORS   • KNEE UNICOMPARTMENTAL  10/21/2013    Performed by Dave Knox M.D. at SURGERY [facility] ORS   • KNEE ARTHROSCOPY  8/14/08    Performed by ANDERSON PLASCENCIA at SURGERY SAME DAY [facility] ORS   • MEDIAL MENISCECTOMY  8/14/08    Performed by ANDERSON PLASCENCIA at SURGERY SAME DAY [facility] ORS   • OTHER ORTHOPEDIC SURGERY  1987    right knee     Family History   Problem Relation Age of Onset   • Heart Disease Father    • Diabetes Father    • Heart Attack Mother 77   • Heart Disease Sister         surgery at age 10   • Stroke Unknown      Social History     Social History   • Marital status:      Spouse name: N/A   • Number of children: N/A   • Years of education: N/A     Occupational History   • Not on file.     Social History Main Topics   • Smoking status: Former Smoker     Packs/day: 1.00     Years: 13.00     Types: Cigarettes     Start date: 1/1/1967     Quit date: 1/1/1980   • Smokeless tobacco: Never Used   • Alcohol use No   • Drug use: No   • Sexual activity: Yes     Partners: Female     Other Topics Concern   • Not on file     Social History Narrative   • No narrative on file     No Known Allergies    Current Outpatient Prescriptions   Medication Sig Dispense Refill   • doxycycline (VIBRAMYCIN) 100 MG Tab Take 1 Tab by mouth 2 times a day. 20 Tab 0   • metoprolol SR (TOPROL XL) 25 MG TABLET SR 24 HR Take 0.5 Tabs by mouth every bedtime. (Patient taking differently: Take 25 mg by mouth every bedtime.) 30 Tab 1   • fenofibrate (TRICOR) 145 MG Tab Take 145 mg by mouth every day.     • omeprazole (PRILOSEC) 20 MG delayed-release capsule Take 20 mg by mouth every bedtime.     • ranitidine (ZANTAC) 150 MG Tab Take 150 mg by mouth 2 times a day.     • tamsulosin (FLOMAX) 0.4 MG capsule  "Take 0.4 mg by mouth every bedtime.       No current facility-administered medications for this visit.        Review of Systems   Respiratory: Positive for cough.    Gastrointestinal: Positive for abdominal pain, diarrhea and heartburn.   Musculoskeletal: Positive for joint pain.   Neurological: Positive for dizziness.     All others systems reviewed and negative.     Objective:     Blood pressure 130/80, pulse 68, height 1.575 m (5' 2\"), weight 86.2 kg (190 lb), SpO2 94 %. Body mass index is 34.75 kg/m².    Physical Exam   General: No acute distress. Well nourished.  HEENT: EOM grossly intact, no scleral icterus, no pharyngeal erythema.   Neck:  No JVD, no bruits, trachea midline  CVS: RRR. Normal S1, S2. No M/R/G. No LE edema.  2+ radial pulses, 2+ DP pulses  Resp: CTAB. No wheezing or crackles/rhonchi. Normal respiratory effort.  Abdomen: Soft, NT, no becky hepatomegaly.  MSK/Ext: No clubbing or cyanosis.  Skin: Warm and dry, no rashes.  Neurological: CN III-XII grossly intact. No focal deficits.   Psych: A&O x 3, appropriate affect, good judgement      Assessment:     1. PAF (paroxysmal atrial fibrillation) ([facility])     2. Essential hypertension     3. SAUL (obstructive sleep apnea)     4. Dyslipidemia     5. Family history of coronary artery disease in brother      Brother  of MI at 50.   6. Non-rheumatic mitral regurgitation     7. CKD (chronic kidney disease) stage 3, GFR 30-59 ml/min ([facility])     8. Abnormal cardiovascular stress test         Medical Decision Making:  Today's Assessment / Status / Plan:     -Ok to stay off ASA for now, would probably restart after hiatal hernia surgery for primary prevention, particularly given FH.  -If surgery want a letter he would be moderate risk for moderate risk surgery, no evaluation needed, I d/w pt and his daughter today  -He is doing very well, no med changes for now  -Prior probably false positive nuclear stress test for infarct (no ischemia), never had symptoms, no " prior infarct, normal echo and ecg. I d/w both today  -In terms of fenofibrate, I typically only prescribe these when TGs are over 500, at risk for causing pancreatitis.  Low threshold to change back to a statin for primary prevention or first line treatment of TGs.     Return in about 1 year (around 12/14/2019).    It is my pleasure to participate in the care of Mr. Sims.  Please do not hesitate to contact me with questions or concerns.    Kimberlee Fontana MD, [facility]  Cardiologist [facility]    Please note that this dictation was created using voice recognition software. I have made every reasonable attempt to correct obvious errors, but it is possible there are errors of grammar and possibly content that I did not discover before finalizing the note.      Addy Poole D.O.  [facility] [address] [facility] [address]  VIA Facsimile: [phone]

## 2018-12-14 NOTE — PROGRESS NOTES
"Subjective:   Chief Complaint:   Chief Complaint   Patient presents with   • Atrial Fibrillation     PP of RG       Vernon Sims is a 63 y.o. male who returns for follow-up of paroxysmal atrial fibrillation with prior ablation 2018.  He also has sleep apnea or CPAP, hypertension and hyperlipidemia.  He was previously on propafenone.    Is currently on metoprolol and fenofibrate. His daughter remembers him being on lipitor but doesn't recall why it was stopped, no recollection of myalgias. Family history of coronary artery disease, his brother had a heart attack at 50 and .  LDL cholesterol is 88.  Blood pressure is borderline controlled today.    He is not limited by chest pain, pressure or tightness. No significant dyspnea on exertion, orthopnea or lower extremity swelling. No significant palpitations, dizziness, or presyncope/syncope. No symptoms of leg claudication.  Rare dizziness after bending over, not cardiac.    Prior \"small infarct\" on nuc , c/w false positive/artifact.    He has bad heart burn, big hiatal hernia, going to have surgery for this.  Has stopped primary prevention ASA 81 mg.     DATA REVIEWED by me:  ECG 3/14/2018  Sinus bradycardia, rate 57, normal EKG    Stress test 2018  Decreased uptake in the apical and mid anterior wall consistent with infarct versus artifact, EF 59%; probably false positive.    Echo 2018  EF 65%, mild concentric LVH, aortic valve sclerosis without stenosis, normal size left atrium, mild mitral annular calcification with moderate MR    A. fib ablation 2018  CONCLUSIONS:  1.  Presentation in normal sinus rhythm with normal conduction intervals.  2.  Successful pulmonary vein isolation.  3.  Successful cavotricuspid isthmus ablation with bidirectional block.  4.  No inducible tachycardia.  5.  Normal anatomy seen with our intracardiac echocardiography and   3-dimensional mapping that were both used to decrease " the use of fluoroscopy   down to 2.6 minutes for this case.    PFTs January 23, 2017  Essentially normal      Most recent labs:     12/8/2018 sodium 134, potassium 4, creatinine 1.37 with GFR 52, LFTs normal    6/22/2018 total cholesterol 146, TGs 95, HDL 39, LDL 88    Past Medical History:   Diagnosis Date   • Arrhythmia 2013    A Flutter   • Arthritis     all joints (osteoarthritis)   • ASTHMA     inhalers   • Atrial fibrillation (HCC)    • Cataract 03/05/2018    Bilateral - no surgery   • Cholesterol blood decreased    • Enlarged prostate    • Heart burn    • Hiatus hernia syndrome    • High cholesterol    • HTN (hypertension) 8/19/2013   • Hypercholesteremia    • PAF (paroxysmal atrial fibrillation) (HCC) 8/19/2013   • Pain 2/10/14    10/10 right shoulder   • Pneumonia 90's   • Preop cardiovascular exam 8/19/2013   • Sleep apnea     cpap    • Snoring      Past Surgical History:   Procedure Laterality Date   • PTERYGIUM EXCISION Right 3/8/2016    Procedure: PTERYGIUM EXCISION;  Surgeon: Homer Parsons M.D.;  Location: SURGERY [facility] ORS;  Service:    • PTERYGIUM EXCISION Left 11/11/2015    Procedure: PTERYGIUM EXCISION W/AMNIOGRAFT AND MITOMYCIN;  Surgeon: Homer Parsons M.D.;  Location: SURGERY [facility] ORS;  Service:    • KNEE MANIPULATION Right 6/29/2015    Procedure: KNEE MANIPULATION;  Surgeon: Dave Knox M.D.;  Location: SURGERY SAME DAY [facility] ORS;  Service:    • KNEE ARTHROPLASTY TOTAL Right 5/11/2015    Procedure: KNEE ARTHROPLASTY TOTAL;  Surgeon: Dave Knox M.D.;  Location: SURGERY [facility] ORS;  Service:    • FULL THICKNESS BLOCK RESECTION  4/30/2014    Performed by Isacc Ramírez M.D. at [facility] ORS   • BIOPSY GENERAL  4/30/2014    left eye lid cyst removal   • SHOULDER ARTHROSCOPY W/ ROTATOR CUFF REPAIR  2/17/2014    Performed by Dave Knox M.D. at [facility] ORS   • KNEE UNICOMPARTMENTAL  10/21/2013    Performed by Dave MOURA  JOANIE Knox at SURGERY [facility] ORS   • KNEE ARTHROSCOPY  8/14/08    Performed by ANDERSON PLASCENCIA at SURGERY SAME DAY [facility] ORS   • MEDIAL MENISCECTOMY  8/14/08    Performed by ANDERSON PLASCENCIA at SURGERY SAME DAY [facility] ORS   • OTHER ORTHOPEDIC SURGERY  1987    right knee     Family History   Problem Relation Age of Onset   • Heart Disease Father    • Diabetes Father    • Heart Attack Mother 77   • Heart Disease Sister         surgery at age 10   • Stroke Unknown      Social History     Social History   • Marital status:      Spouse name: N/A   • Number of children: N/A   • Years of education: N/A     Occupational History   • Not on file.     Social History Main Topics   • Smoking status: Former Smoker     Packs/day: 1.00     Years: 13.00     Types: Cigarettes     Start date: 1/1/1967     Quit date: 1/1/1980   • Smokeless tobacco: Never Used   • Alcohol use No   • Drug use: No   • Sexual activity: Yes     Partners: Female     Other Topics Concern   • Not on file     Social History Narrative   • No narrative on file     No Known Allergies    Current Outpatient Prescriptions   Medication Sig Dispense Refill   • doxycycline (VIBRAMYCIN) 100 MG Tab Take 1 Tab by mouth 2 times a day. 20 Tab 0   • metoprolol SR (TOPROL XL) 25 MG TABLET SR 24 HR Take 0.5 Tabs by mouth every bedtime. (Patient taking differently: Take 25 mg by mouth every bedtime.) 30 Tab 1   • fenofibrate (TRICOR) 145 MG Tab Take 145 mg by mouth every day.     • omeprazole (PRILOSEC) 20 MG delayed-release capsule Take 20 mg by mouth every bedtime.     • ranitidine (ZANTAC) 150 MG Tab Take 150 mg by mouth 2 times a day.     • tamsulosin (FLOMAX) 0.4 MG capsule Take 0.4 mg by mouth every bedtime.       No current facility-administered medications for this visit.        Review of Systems   Respiratory: Positive for cough.    Gastrointestinal: Positive for abdominal pain, diarrhea and heartburn.   Musculoskeletal: Positive for joint pain.  "  Neurological: Positive for dizziness.     All others systems reviewed and negative.     Objective:     Blood pressure 130/80, pulse 68, height 1.575 m (5' 2\"), weight 86.2 kg (190 lb), SpO2 94 %. Body mass index is 34.75 kg/m².    Physical Exam   General: No acute distress. Well nourished.  HEENT: EOM grossly intact, no scleral icterus, no pharyngeal erythema.   Neck:  No JVD, no bruits, trachea midline  CVS: RRR. Normal S1, S2. No M/R/G. No LE edema.  2+ radial pulses, 2+ DP pulses  Resp: CTAB. No wheezing or crackles/rhonchi. Normal respiratory effort.  Abdomen: Soft, NT, no becky hepatomegaly.  MSK/Ext: No clubbing or cyanosis.  Skin: Warm and dry, no rashes.  Neurological: CN III-XII grossly intact. No focal deficits.   Psych: A&O x 3, appropriate affect, good judgement      Assessment:     1. PAF (paroxysmal atrial fibrillation) ([facility])     2. Essential hypertension     3. SAUL (obstructive sleep apnea)     4. Dyslipidemia     5. Family history of coronary artery disease in brother      Brother  of MI at 50.   6. Non-rheumatic mitral regurgitation     7. CKD (chronic kidney disease) stage 3, GFR 30-59 ml/min ([facility])     8. Abnormal cardiovascular stress test         Medical Decision Making:  Today's Assessment / Status / Plan:     -Ok to stay off ASA for now, would probably restart after hiatal hernia surgery for primary prevention, particularly given FH.  -If surgery want a letter he would be moderate risk for moderate risk surgery, no evaluation needed, I d/w pt and his daughter today  -He is doing very well, no med changes for now  -Prior probably false positive nuclear stress test for infarct (no ischemia), never had symptoms, no prior infarct, normal echo and ecg. I d/w both today  -In terms of fenofibrate, I typically only prescribe these when TGs are over 500, at risk for causing pancreatitis.  Low threshold to change back to a statin for primary prevention or first line treatment of TGs.     Return in " about 1 year (around 12/14/2019).    It is my pleasure to participate in the care of Mr. Sims.  Please do not hesitate to contact me with questions or concerns.    Kimberlee Fontana MD, [facility]  Cardiologist [facility]    Please note that this dictation was created using voice recognition software. I have made every reasonable attempt to correct obvious errors, but it is possible there are errors of grammar and possibly content that I did not discover before finalizing the note.

## 2018-12-21 ENCOUNTER — HOSPITAL ENCOUNTER (OUTPATIENT)
Dept: LAB | Facility: MEDICAL CENTER | Age: 63
End: 2018-12-21
Attending: FAMILY MEDICINE
Payer: COMMERCIAL

## 2018-12-21 LAB
ALBUMIN SERPL BCP-MCNC: 4 G/DL (ref 3.2–4.9)
ALBUMIN/GLOB SERPL: 1.2 G/DL
ALP SERPL-CCNC: 33 U/L (ref 30–99)
ALT SERPL-CCNC: 29 U/L (ref 2–50)
ANION GAP SERPL CALC-SCNC: 6 MMOL/L (ref 0–11.9)
AST SERPL-CCNC: 24 U/L (ref 12–45)
BILIRUB SERPL-MCNC: 0.5 MG/DL (ref 0.1–1.5)
BUN SERPL-MCNC: 23 MG/DL (ref 8–22)
CALCIUM SERPL-MCNC: 9.9 MG/DL (ref 8.5–10.5)
CHLORIDE SERPL-SCNC: 106 MMOL/L (ref 96–112)
CHOLEST SERPL-MCNC: 159 MG/DL (ref 100–199)
CO2 SERPL-SCNC: 26 MMOL/L (ref 20–33)
CREAT SERPL-MCNC: 1.13 MG/DL (ref 0.5–1.4)
EST. AVERAGE GLUCOSE BLD GHB EST-MCNC: 117 MG/DL
FASTING STATUS PATIENT QL REPORTED: NORMAL
GLOBULIN SER CALC-MCNC: 3.3 G/DL (ref 1.9–3.5)
GLUCOSE SERPL-MCNC: 89 MG/DL (ref 65–99)
HBA1C MFR BLD: 5.7 % (ref 0–5.6)
HDLC SERPL-MCNC: 39 MG/DL
LDLC SERPL CALC-MCNC: 102 MG/DL
POTASSIUM SERPL-SCNC: 3.9 MMOL/L (ref 3.6–5.5)
PROT SERPL-MCNC: 7.3 G/DL (ref 6–8.2)
SODIUM SERPL-SCNC: 138 MMOL/L (ref 135–145)
TRIGL SERPL-MCNC: 88 MG/DL (ref 0–149)

## 2018-12-21 PROCEDURE — 83036 HEMOGLOBIN GLYCOSYLATED A1C: CPT

## 2018-12-21 PROCEDURE — 80053 COMPREHEN METABOLIC PANEL: CPT

## 2018-12-21 PROCEDURE — 80061 LIPID PANEL: CPT

## 2018-12-21 PROCEDURE — 36415 COLL VENOUS BLD VENIPUNCTURE: CPT

## 2018-12-26 ENCOUNTER — HOSPITAL ENCOUNTER (OUTPATIENT)
Dept: RADIOLOGY | Facility: MEDICAL CENTER | Age: 63
End: 2018-12-26
Attending: SURGERY
Payer: COMMERCIAL

## 2018-12-26 DIAGNOSIS — K22.4 ESOPHAGEAL DYSMOTILITY: ICD-10-CM

## 2018-12-26 PROCEDURE — 74220 X-RAY XM ESOPHAGUS 1CNTRST: CPT

## 2018-12-26 PROCEDURE — 700112 HCHG RX REV CODE 229: Performed by: SURGERY

## 2018-12-26 PROCEDURE — A9270 NON-COVERED ITEM OR SERVICE: HCPCS | Performed by: SURGERY

## 2018-12-26 RX ADMIN — ANTACID/ANTIFLATULENT 1 PACKET: 380; 550; 10; 10 GRANULE, EFFERVESCENT ORAL at 14:15

## 2019-03-30 ENCOUNTER — HOSPITAL ENCOUNTER (OUTPATIENT)
Dept: RADIOLOGY | Facility: MEDICAL CENTER | Age: 64
End: 2019-03-30
Attending: FAMILY MEDICINE
Payer: COMMERCIAL

## 2019-03-30 DIAGNOSIS — M25.532 ARTHRALGIA OF LEFT WRIST: ICD-10-CM

## 2019-03-30 PROCEDURE — 73110 X-RAY EXAM OF WRIST: CPT | Mod: LT

## 2019-06-20 ENCOUNTER — HOSPITAL ENCOUNTER (OUTPATIENT)
Dept: LAB | Facility: MEDICAL CENTER | Age: 64
End: 2019-06-20
Attending: FAMILY MEDICINE
Payer: COMMERCIAL

## 2019-06-20 LAB
ALBUMIN SERPL BCP-MCNC: 4 G/DL (ref 3.2–4.9)
ALBUMIN/GLOB SERPL: 1.4 G/DL
ALP SERPL-CCNC: 33 U/L (ref 30–99)
ALT SERPL-CCNC: 31 U/L (ref 2–50)
ANION GAP SERPL CALC-SCNC: 6 MMOL/L (ref 0–11.9)
AST SERPL-CCNC: 29 U/L (ref 12–45)
BILIRUB SERPL-MCNC: 0.5 MG/DL (ref 0.1–1.5)
BUN SERPL-MCNC: 21 MG/DL (ref 8–22)
CALCIUM SERPL-MCNC: 9.9 MG/DL (ref 8.5–10.5)
CHLORIDE SERPL-SCNC: 107 MMOL/L (ref 96–112)
CHOLEST SERPL-MCNC: 149 MG/DL (ref 100–199)
CO2 SERPL-SCNC: 27 MMOL/L (ref 20–33)
CREAT SERPL-MCNC: 1.32 MG/DL (ref 0.5–1.4)
EST. AVERAGE GLUCOSE BLD GHB EST-MCNC: 123 MG/DL
FASTING STATUS PATIENT QL REPORTED: NORMAL
GLOBULIN SER CALC-MCNC: 2.8 G/DL (ref 1.9–3.5)
GLUCOSE SERPL-MCNC: 98 MG/DL (ref 65–99)
HBA1C MFR BLD: 5.9 % (ref 0–5.6)
HDLC SERPL-MCNC: 39 MG/DL
LDLC SERPL CALC-MCNC: 86 MG/DL
POTASSIUM SERPL-SCNC: 4.3 MMOL/L (ref 3.6–5.5)
PROT SERPL-MCNC: 6.8 G/DL (ref 6–8.2)
SODIUM SERPL-SCNC: 140 MMOL/L (ref 135–145)
TRIGL SERPL-MCNC: 120 MG/DL (ref 0–149)

## 2019-06-20 PROCEDURE — 80053 COMPREHEN METABOLIC PANEL: CPT

## 2019-06-20 PROCEDURE — 36415 COLL VENOUS BLD VENIPUNCTURE: CPT

## 2019-06-20 PROCEDURE — 83036 HEMOGLOBIN GLYCOSYLATED A1C: CPT | Mod: GA

## 2019-06-20 PROCEDURE — 80061 LIPID PANEL: CPT

## 2020-12-30 ENCOUNTER — HOSPITAL ENCOUNTER (OUTPATIENT)
Dept: LAB | Facility: MEDICAL CENTER | Age: 65
End: 2020-12-30
Attending: ANESTHESIOLOGY
Payer: COMMERCIAL

## 2020-12-30 LAB
COVID ORDER STATUS COVID19: NORMAL
SARS-COV-2 RNA RESP QL NAA+PROBE: NOTDETECTED
SPECIMEN SOURCE: NORMAL

## 2020-12-30 PROCEDURE — C9803 HOPD COVID-19 SPEC COLLECT: HCPCS

## 2020-12-30 PROCEDURE — U0003 INFECTIOUS AGENT DETECTION BY NUCLEIC ACID (DNA OR RNA); SEVERE ACUTE RESPIRATORY SYNDROME CORONAVIRUS 2 (SARS-COV-2) (CORONAVIRUS DISEASE [COVID-19]), AMPLIFIED PROBE TECHNIQUE, MAKING USE OF HIGH THROUGHPUT TECHNOLOGIES AS DESCRIBED BY CMS-2020-01-R: HCPCS

## 2021-03-03 DIAGNOSIS — Z23 NEED FOR VACCINATION: ICD-10-CM

## 2022-12-05 NOTE — MR AVS SNAPSHOT
"        Vernon Saldaña   2017 1:40 PM   Office Visit   MRN: [id number]    Department:  [facility]   Dept Phone:  [phone]    Description:  Male : 1955   Provider:  ALEX Olson           Reason for Visit     Apnea           Allergies as of 2017     No Known Allergies      You were diagnosed with     SAUL (obstructive sleep apnea)   [542353]       Mild intermittent asthma without complication   [971829]       Allergic rhinitis, unspecified allergic rhinitis trigger, unspecified rhinitis seasonality   [9012090]       PAF (paroxysmal atrial fibrillation) (CMS-HCC)   [204285]       Essential hypertension   [0329867]         Vital Signs     Blood Pressure Pulse Temperature Respirations Height Weight    126/74 mmHg 59 37.1 °C (98.7 °F) 16 1.638 m (5' 4.49\") 97.523 kg (215 lb)    Body Mass Index Oxygen Saturation Smoking Status             36.35 kg/m2 92% Former Smoker         Basic Information     Date Of Birth Sex Race Ethnicity Preferred Language    1955 Male  or   Origin ([place] Islander,[place]ese,[place]ian,Senegalese, etc) English      Your appointments     2017 10:30 AM   FOLLOW UP with Dave Ryan M.D.   [facility] ([facility]    [address]  [facility] [address] [phone]              Problem List              ICD-10-CM Priority Class Noted - Resolved    PAF (paroxysmal atrial fibrillation) (CMS-HCC) I48.0   2013 - Present    Preop cardiovascular exam Z01.810   2013 - Present    Hypercholesteremia E78.00   Unknown - Present    Arthritis M19.90   Unknown - Present    SAUL (obstructive sleep apnea) G47.33   10/2/2013 - Present    Osteoarthritis of left knee M17.9   10/21/2013 - Present    Complete rupture of rotator cuff M75.120   2014 - Present    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   2014 - Present    Primary localized osteoarthrosis, lower leg M17.10   2015 - Present   "    Osteoarthrosis involving lower leg M17.9   6/29/2015 - Present    Essential hypertension I10   9/28/2015 - Present    Peripheral pterygium, progressive H11.059   11/11/2015 - Present    History of pterygium excision Z98.890   3/8/2016 - Present    Mild intermittent asthma without complication J45.20   1/23/2017 - Present    Allergic rhinitis J30.9   1/23/2017 - Present      Health Maintenance        Date Due Completion Dates    IMM DTaP/Tdap/Td Vaccine (1 - Tdap) 7/8/1973 ---    COLONOSCOPY 7/8/2004 ---    IMM ZOSTER VACCINE 7/8/2014 ---    IMM INFLUENZA (1) 9/1/2016 10/22/2013            Current Immunizations     Influenza TIV (IM) 12/20/2016, 10/22/2013  1:48 PM    Pneumococcal polysaccharide vaccine (PPSV-23) 10/22/2013  1:54 PM      Below and/or attached are the medications your provider expects you to take. Review all of your home medications and newly ordered medications with your provider and/or pharmacist. Follow medication instructions as directed by your provider and/or pharmacist. Please keep your medication list with you and share with your provider. Update the information when medications are discontinued, doses are changed, or new medications (including over-the-counter products) are added; and carry medication information at all times in the event of emergency situations     Allergies:  No Known Allergies          Medications  Valid as of: January 23, 2017 -  1:52 PM    Generic Name Brand Name Tablet Size Instructions for use    Albuterol Sulfate (Aero Soln) albuterol 108 (90 BASE) MCG/ACT Inhale 2 Puffs by mouth every 6 hours as needed for Shortness of Breath.        Albuterol Sulfate (AEROSOL POWDER, BREATH ACTIVATED) Albuterol Sulfate 108 (90 BASE) MCG/ACT Inhale  by mouth.        Aspirin (Chew Tab) ASA 81 MG Take 1 Tab by mouth every day.        Atorvastatin Calcium (Tab) LIPITOR 20 MG Take 20 mg by mouth every evening.        Azelastine HCl   Spray 2 Sprays in nose 2 Times a Day.         Azelastine HCl (Solution) ASTELIN 137 MCG/SPRAY USE 1-2 SPRAYS TO EACH NOSTRIL TWICE DAY        Cyclobenzaprine HCl (Tab) FLEXERIL 10 MG Take 10 mg by mouth 3 times a day as needed.        Difluprednate (Emulsion) Difluprednate 0.05 % by Ophthalmic route.        Influenza Vac Subunit Quad (Suspension Prefilled Syringe) FLUCELVAX QUADRIVALENT 0.5 ML TO BE ADMINISTERED BY PHARMACIST FOR IMMUNIZATION        Loratadine (Tab) CLARITIN 10 MG Take 10 mg by mouth every day.        Metoprolol Succinate (TABLET SR 24 HR) TOPROL XL 50 MG Take 1 Tab by mouth every day.        Metoprolol Succinate (TABLET SR 24 HR) TOPROL XL 25 MG Take 25 mg by mouth every day. FOR BLOOD PRESSURE        Montelukast Sodium (Tab) SINGULAIR 10 MG Take 10 mg by mouth every day.        Naproxen Sodium (Tab) ANAPROX 220 MG Take 220 mg by mouth 2 times a day, with meals.        Naproxen Sodium (Tab) ANAPROX 220 MG Take 220 mg by mouth 2 times a day, with meals.        Omeprazole (CAPSULE DELAYED RELEASE) PRILOSEC 20 MG Take 20 mg by mouth every day.        Propafenone HCl (Tab) RYTHMOL 225 MG TAKE 1 TABLET BY MOUTH EVERY 8 HOURS        RaNITidine HCl (Tab) ZANTAC 150 MG Take 150 mg by mouth 2 times a day.        Tamsulosin HCl (Cap) FLOMAX 0.4 MG Take 0.4 mg by mouth every bedtime.        .                 Medicines prescribed today were sent to:     [id number] - [place], [place] - [address]    [address] [address]    Phone: [phone] Fax: [phone]    Open 24 Hours?: No      Medication refill instructions:       If your prescription bottle indicates you have medication refills left, it is not necessary to call your provider’s office. Please contact your pharmacy and they will refill your medication.    If your prescription bottle indicates you do not have any refills left, you may request refills at any time through one of the following ways: The online [facility] system (except Urgent Care), by calling your provider’s office, or  by asking your pharmacy to contact your provider’s office with a refill request. Medication refills are processed only during regular business hours and may not be available until the next business day. Your provider may request additional information or to have a follow-up visit with you prior to refilling your medication.   *Please Note: Medication refills are assigned a new Rx number when refilled electronically. Your pharmacy may indicate that no refills were authorized even though a new prescription for the same medication is available at the pharmacy. Please request the medicine by name with the pharmacy before contacting your provider for a refill.        Instructions    Plan:    1) Continue Auto CPAP 8-14 CM H20. Order to [facility] for new mask and supplies.   2) Sleep hygiene discussed.  3) Continue to follow with Cardiology.  4) Continue Singulair 10 mg 1 po qhs. Sample of Proair respiclick provided to have on hand. Instructed in 1-2 puffs every 4 hours as needed. He has not required use of inhalers in over 1 year. However, I have encouraged him to keep a rescue inhaler on hand.  5) He is up to date on Prevnar 13, Pneumovax 23 and Influenza vaccines.   6) Follow up in 1 year, sooner if needed. His daughter states she will bring his compliance chip to the office this week to have download performed.           [employer] Access Code: Activation code not generated  Current [employer] Status: Active           [Time Spent: ___ minutes] : I have spent [unfilled] minutes of time on the encounter.
